# Patient Record
Sex: MALE | Race: WHITE | NOT HISPANIC OR LATINO | Employment: FULL TIME | URBAN - METROPOLITAN AREA
[De-identification: names, ages, dates, MRNs, and addresses within clinical notes are randomized per-mention and may not be internally consistent; named-entity substitution may affect disease eponyms.]

---

## 2017-08-24 ENCOUNTER — HOSPITAL ENCOUNTER (OUTPATIENT)
Dept: CT IMAGING | Facility: HOSPITAL | Age: 52
Discharge: HOME | End: 2017-08-24
Attending: NURSE PRACTITIONER | Admitting: NURSE PRACTITIONER

## 2017-08-24 LAB — CREAT BLDA-MCNC: 0.9 MG/DL (ref 0.6–1.3)

## 2017-10-03 ENCOUNTER — OFFICE (AMBULATORY)
Dept: URBAN - METROPOLITAN AREA CLINIC 64 | Facility: CLINIC | Age: 52
End: 2017-10-03

## 2017-10-03 ENCOUNTER — ON CAMPUS - OUTPATIENT (AMBULATORY)
Dept: URBAN - METROPOLITAN AREA HOSPITAL 2 | Facility: HOSPITAL | Age: 52
End: 2017-10-03
Payer: COMMERCIAL

## 2017-10-03 VITALS
SYSTOLIC BLOOD PRESSURE: 135 MMHG | OXYGEN SATURATION: 97 % | DIASTOLIC BLOOD PRESSURE: 71 MMHG | HEART RATE: 71 BPM | SYSTOLIC BLOOD PRESSURE: 156 MMHG | HEART RATE: 79 BPM | OXYGEN SATURATION: 96 % | WEIGHT: 203 LBS | SYSTOLIC BLOOD PRESSURE: 116 MMHG | TEMPERATURE: 98 F | SYSTOLIC BLOOD PRESSURE: 160 MMHG | DIASTOLIC BLOOD PRESSURE: 84 MMHG | DIASTOLIC BLOOD PRESSURE: 77 MMHG | SYSTOLIC BLOOD PRESSURE: 107 MMHG | OXYGEN SATURATION: 98 % | DIASTOLIC BLOOD PRESSURE: 61 MMHG | HEART RATE: 75 BPM | HEART RATE: 77 BPM | OXYGEN SATURATION: 95 % | DIASTOLIC BLOOD PRESSURE: 99 MMHG | RESPIRATION RATE: 18 BRPM | HEART RATE: 74 BPM | DIASTOLIC BLOOD PRESSURE: 98 MMHG | OXYGEN SATURATION: 99 % | DIASTOLIC BLOOD PRESSURE: 78 MMHG | HEART RATE: 65 BPM | RESPIRATION RATE: 16 BRPM | SYSTOLIC BLOOD PRESSURE: 130 MMHG | HEART RATE: 80 BPM | HEIGHT: 67 IN | SYSTOLIC BLOOD PRESSURE: 115 MMHG

## 2017-10-03 DIAGNOSIS — K62.1 RECTAL POLYP: ICD-10-CM

## 2017-10-03 DIAGNOSIS — D12.3 BENIGN NEOPLASM OF TRANSVERSE COLON: ICD-10-CM

## 2017-10-03 DIAGNOSIS — Z12.11 ENCOUNTER FOR SCREENING FOR MALIGNANT NEOPLASM OF COLON: ICD-10-CM

## 2017-10-03 LAB
GI HISTOLOGY: A. UNSPECIFIED: (no result)
GI HISTOLOGY: B. UNSPECIFIED: (no result)
GI HISTOLOGY: PDF REPORT: (no result)

## 2017-10-03 PROCEDURE — 88305 TISSUE EXAM BY PATHOLOGIST: CPT

## 2017-10-03 PROCEDURE — 45385 COLONOSCOPY W/LESION REMOVAL: CPT | Mod: 33

## 2017-10-03 RX ADMIN — PROPOFOL: 10 INJECTION, EMULSION INTRAVENOUS at 11:32

## 2020-08-26 ENCOUNTER — LAB (OUTPATIENT)
Dept: LAB | Facility: HOSPITAL | Age: 55
End: 2020-08-26

## 2020-08-26 ENCOUNTER — HOSPITAL ENCOUNTER (OUTPATIENT)
Dept: CARDIOLOGY | Facility: HOSPITAL | Age: 55
Discharge: HOME | End: 2020-08-26
Admitting: ORTHOPAEDIC SURGERY

## 2020-08-26 LAB
DEPRECATED RDW RBC AUTO: 42.3 FL (ref 37–54)
ERYTHROCYTE [DISTWIDTH] IN BLOOD BY AUTOMATED COUNT: 11.9 % (ref 12.3–15.4)
HCT VFR BLD AUTO: 48.3 % (ref 37.5–51)
HGB BLD-MCNC: 16.2 G/DL (ref 13–17.7)
MCH RBC QN AUTO: 32.5 PG (ref 26.6–33)
MCHC RBC AUTO-ENTMCNC: 33.5 G/DL (ref 31.5–35.7)
MCV RBC AUTO: 96.8 FL (ref 79–97)
PLATELET # BLD AUTO: 274 10*3/MM3 (ref 140–450)
PMV BLD AUTO: 10.9 FL (ref 6–12)
RBC # BLD AUTO: 4.99 10*6/MM3 (ref 4.14–5.8)
WBC # BLD AUTO: 8.66 10*3/MM3 (ref 3.4–10.8)

## 2020-08-26 PROCEDURE — U0002 COVID-19 LAB TEST NON-CDC: HCPCS

## 2020-08-26 PROCEDURE — C9803 HOPD COVID-19 SPEC COLLECT: HCPCS

## 2020-08-26 PROCEDURE — U0004 COV-19 TEST NON-CDC HGH THRU: HCPCS

## 2020-08-26 PROCEDURE — 85027 COMPLETE CBC AUTOMATED: CPT

## 2020-08-26 PROCEDURE — 93005 ELECTROCARDIOGRAM TRACING: CPT | Performed by: ORTHOPAEDIC SURGERY

## 2020-08-27 ENCOUNTER — ANESTHESIA EVENT (OUTPATIENT)
Dept: PERIOP | Facility: HOSPITAL | Age: 55
End: 2020-08-27

## 2020-08-27 LAB
REF LAB TEST METHOD: NORMAL
SARS-COV-2 RNA RESP QL NAA+PROBE: NOT DETECTED

## 2020-08-28 ENCOUNTER — HOSPITAL ENCOUNTER (OUTPATIENT)
Facility: HOSPITAL | Age: 55
Setting detail: HOSPITAL OUTPATIENT SURGERY
Discharge: HOME | End: 2020-08-28
Attending: ORTHOPAEDIC SURGERY | Admitting: ORTHOPAEDIC SURGERY

## 2020-08-28 ENCOUNTER — ANESTHESIA (OUTPATIENT)
Dept: PERIOP | Facility: HOSPITAL | Age: 55
End: 2020-08-28

## 2020-08-28 VITALS
OXYGEN SATURATION: 92 % | RESPIRATION RATE: 16 BRPM | DIASTOLIC BLOOD PRESSURE: 71 MMHG | HEART RATE: 88 BPM | TEMPERATURE: 96.5 F | HEIGHT: 67 IN | BODY MASS INDEX: 32.15 KG/M2 | WEIGHT: 204.81 LBS | SYSTOLIC BLOOD PRESSURE: 135 MMHG

## 2020-08-28 PROCEDURE — 25010000002 KETOROLAC TROMETHAMINE PER 15 MG: Performed by: ANESTHESIOLOGY

## 2020-08-28 PROCEDURE — 25010000002 MIDAZOLAM PER 1 MG: Performed by: ANESTHESIOLOGIST ASSISTANT

## 2020-08-28 PROCEDURE — 25010000002 FENTANYL CITRATE (PF) 100 MCG/2ML SOLUTION: Performed by: ANESTHESIOLOGIST ASSISTANT

## 2020-08-28 PROCEDURE — 25010000002 PROPOFOL 10 MG/ML EMULSION: Performed by: ANESTHESIOLOGIST ASSISTANT

## 2020-08-28 PROCEDURE — 25010000002 DEXAMETHASONE PER 1 MG: Performed by: ANESTHESIOLOGIST ASSISTANT

## 2020-08-28 PROCEDURE — 25010000002 ONDANSETRON PER 1 MG: Performed by: ANESTHESIOLOGIST ASSISTANT

## 2020-08-28 PROCEDURE — 25010000002 METOCLOPRAMIDE PER 10 MG: Performed by: ANESTHESIOLOGY

## 2020-08-28 PROCEDURE — 25010000002 TRIAMCINOLONE PER 10 MG: Performed by: ORTHOPAEDIC SURGERY

## 2020-08-28 RX ORDER — BUPIVACAINE HYDROCHLORIDE AND EPINEPHRINE 2.5; 5 MG/ML; UG/ML
INJECTION, SOLUTION INFILTRATION; PERINEURAL AS NEEDED
Status: DISCONTINUED | OUTPATIENT
Start: 2020-08-28 | End: 2020-08-28 | Stop reason: HOSPADM

## 2020-08-28 RX ORDER — TRIAMCINOLONE ACETONIDE 40 MG/ML
INJECTION, SUSPENSION INTRA-ARTICULAR; INTRAMUSCULAR AS NEEDED
Status: DISCONTINUED | OUTPATIENT
Start: 2020-08-28 | End: 2020-08-28 | Stop reason: HOSPADM

## 2020-08-28 RX ORDER — METOCLOPRAMIDE HYDROCHLORIDE 5 MG/ML
INJECTION INTRAMUSCULAR; INTRAVENOUS AS NEEDED
Status: DISCONTINUED | OUTPATIENT
Start: 2020-08-28 | End: 2020-08-28 | Stop reason: SURG

## 2020-08-28 RX ORDER — DIPHENHYDRAMINE HYDROCHLORIDE 50 MG/ML
12.5 INJECTION INTRAMUSCULAR; INTRAVENOUS
Status: DISCONTINUED | OUTPATIENT
Start: 2020-08-28 | End: 2020-08-28 | Stop reason: HOSPADM

## 2020-08-28 RX ORDER — HYDROMORPHONE HCL 110MG/55ML
0.5 PATIENT CONTROLLED ANALGESIA SYRINGE INTRAVENOUS
Status: DISCONTINUED | OUTPATIENT
Start: 2020-08-28 | End: 2020-08-28 | Stop reason: HOSPADM

## 2020-08-28 RX ORDER — HYDROCODONE BITARTRATE AND ACETAMINOPHEN 5; 325 MG/1; MG/1
1 TABLET ORAL EVERY 4 HOURS PRN
Qty: 30 TABLET | Refills: 0 | Status: SHIPPED | OUTPATIENT
Start: 2020-08-28

## 2020-08-28 RX ORDER — PROPOFOL 10 MG/ML
VIAL (ML) INTRAVENOUS AS NEEDED
Status: DISCONTINUED | OUTPATIENT
Start: 2020-08-28 | End: 2020-08-28 | Stop reason: SURG

## 2020-08-28 RX ORDER — FENTANYL CITRATE 50 UG/ML
INJECTION, SOLUTION INTRAMUSCULAR; INTRAVENOUS AS NEEDED
Status: DISCONTINUED | OUTPATIENT
Start: 2020-08-28 | End: 2020-08-28 | Stop reason: SURG

## 2020-08-28 RX ORDER — MEPERIDINE HYDROCHLORIDE 25 MG/ML
12.5 INJECTION INTRAMUSCULAR; INTRAVENOUS; SUBCUTANEOUS
Status: DISCONTINUED | OUTPATIENT
Start: 2020-08-28 | End: 2020-08-28 | Stop reason: HOSPADM

## 2020-08-28 RX ORDER — KETOROLAC TROMETHAMINE 30 MG/ML
INJECTION, SOLUTION INTRAMUSCULAR; INTRAVENOUS AS NEEDED
Status: DISCONTINUED | OUTPATIENT
Start: 2020-08-28 | End: 2020-08-28 | Stop reason: SURG

## 2020-08-28 RX ORDER — SODIUM CHLORIDE 0.9 % (FLUSH) 0.9 %
10 SYRINGE (ML) INJECTION AS NEEDED
Status: DISCONTINUED | OUTPATIENT
Start: 2020-08-28 | End: 2020-08-28 | Stop reason: HOSPADM

## 2020-08-28 RX ORDER — LABETALOL HYDROCHLORIDE 5 MG/ML
5 INJECTION, SOLUTION INTRAVENOUS
Status: DISCONTINUED | OUTPATIENT
Start: 2020-08-28 | End: 2020-08-28 | Stop reason: HOSPADM

## 2020-08-28 RX ORDER — MIDAZOLAM HYDROCHLORIDE 1 MG/ML
INJECTION INTRAMUSCULAR; INTRAVENOUS AS NEEDED
Status: DISCONTINUED | OUTPATIENT
Start: 2020-08-28 | End: 2020-08-28 | Stop reason: SURG

## 2020-08-28 RX ORDER — SODIUM CHLORIDE, SODIUM LACTATE, POTASSIUM CHLORIDE, CALCIUM CHLORIDE 600; 310; 30; 20 MG/100ML; MG/100ML; MG/100ML; MG/100ML
9 INJECTION, SOLUTION INTRAVENOUS CONTINUOUS PRN
Status: DISCONTINUED | OUTPATIENT
Start: 2020-08-28 | End: 2020-08-28 | Stop reason: HOSPADM

## 2020-08-28 RX ORDER — ONDANSETRON 2 MG/ML
4 INJECTION INTRAMUSCULAR; INTRAVENOUS ONCE AS NEEDED
Status: COMPLETED | OUTPATIENT
Start: 2020-08-28 | End: 2020-08-28

## 2020-08-28 RX ORDER — DEXAMETHASONE SODIUM PHOSPHATE 4 MG/ML
INJECTION, SOLUTION INTRA-ARTICULAR; INTRALESIONAL; INTRAMUSCULAR; INTRAVENOUS; SOFT TISSUE AS NEEDED
Status: DISCONTINUED | OUTPATIENT
Start: 2020-08-28 | End: 2020-08-28 | Stop reason: SURG

## 2020-08-28 RX ORDER — IPRATROPIUM BROMIDE AND ALBUTEROL SULFATE 2.5; .5 MG/3ML; MG/3ML
3 SOLUTION RESPIRATORY (INHALATION) ONCE AS NEEDED
Status: DISCONTINUED | OUTPATIENT
Start: 2020-08-28 | End: 2020-08-28 | Stop reason: HOSPADM

## 2020-08-28 RX ORDER — LIDOCAINE HYDROCHLORIDE 10 MG/ML
INJECTION, SOLUTION EPIDURAL; INFILTRATION; INTRACAUDAL; PERINEURAL AS NEEDED
Status: DISCONTINUED | OUTPATIENT
Start: 2020-08-28 | End: 2020-08-28 | Stop reason: SURG

## 2020-08-28 RX ORDER — SODIUM CHLORIDE 0.9 % (FLUSH) 0.9 %
10 SYRINGE (ML) INJECTION EVERY 12 HOURS SCHEDULED
Status: DISCONTINUED | OUTPATIENT
Start: 2020-08-28 | End: 2020-08-28 | Stop reason: HOSPADM

## 2020-08-28 RX ORDER — HYDROCODONE BITARTRATE AND ACETAMINOPHEN 10; 325 MG/1; MG/1
1 TABLET ORAL ONCE AS NEEDED
Status: DISCONTINUED | OUTPATIENT
Start: 2020-08-28 | End: 2020-08-28 | Stop reason: HOSPADM

## 2020-08-28 RX ADMIN — MIDAZOLAM 2 MG: 1 INJECTION INTRAMUSCULAR; INTRAVENOUS at 15:18

## 2020-08-28 RX ADMIN — SODIUM CHLORIDE, SODIUM LACTATE, POTASSIUM CHLORIDE, AND CALCIUM CHLORIDE 9 ML/HR: 600; 310; 30; 20 INJECTION, SOLUTION INTRAVENOUS at 13:29

## 2020-08-28 RX ADMIN — PROPOFOL 200 MG: 10 INJECTION, EMULSION INTRAVENOUS at 15:18

## 2020-08-28 RX ADMIN — FENTANYL CITRATE 100 MCG: 50 INJECTION, SOLUTION INTRAMUSCULAR; INTRAVENOUS at 16:18

## 2020-08-28 RX ADMIN — CEFAZOLIN SODIUM 2 G: 1 INJECTION, POWDER, FOR SOLUTION INTRAMUSCULAR; INTRAVENOUS at 15:23

## 2020-08-28 RX ADMIN — FENTANYL CITRATE 100 MCG: 50 INJECTION, SOLUTION INTRAMUSCULAR; INTRAVENOUS at 15:18

## 2020-08-28 RX ADMIN — METOCLOPRAMIDE 10 MG: 5 INJECTION, SOLUTION INTRAMUSCULAR; INTRAVENOUS at 15:18

## 2020-08-28 RX ADMIN — GLYCOPYRROLATE 0.2 MG: 0.2 INJECTION, SOLUTION INTRAMUSCULAR; INTRAVITREAL at 15:18

## 2020-08-28 RX ADMIN — DEXAMETHASONE SODIUM PHOSPHATE 4 MG: 4 INJECTION, SOLUTION INTRAMUSCULAR; INTRAVENOUS at 15:25

## 2020-08-28 RX ADMIN — KETOROLAC TROMETHAMINE 30 MG: 30 INJECTION, SOLUTION INTRAMUSCULAR at 16:09

## 2020-08-28 RX ADMIN — ONDANSETRON 4 MG: 2 INJECTION INTRAMUSCULAR; INTRAVENOUS at 16:09

## 2020-08-28 RX ADMIN — FENTANYL CITRATE 100 MCG: 50 INJECTION, SOLUTION INTRAMUSCULAR; INTRAVENOUS at 15:45

## 2020-08-28 RX ADMIN — LIDOCAINE HYDROCHLORIDE 50 MG: 10 INJECTION, SOLUTION EPIDURAL; INFILTRATION; INTRACAUDAL; PERINEURAL at 15:18

## 2020-08-28 NOTE — ANESTHESIA PROCEDURE NOTES
Airway  Urgency: elective    Date/Time: 8/28/2020 3:20 PM  Airway not difficult    General Information and Staff    Patient location during procedure: OR  Anesthesiologist: Campbell Salgado MD  CRNA: Eliazar Montoya AA    Indications and Patient Condition  Indications for airway management: CNS depression    Preoxygenated: yes  MILS maintained throughout  Mask difficulty assessment: 0 - not attempted    Final Airway Details  Final airway type: supraglottic airway      Successful airway: LMA  Size 4    Number of attempts at approach: 1  Assessment: lips, teeth, and gum same as pre-op

## 2020-08-28 NOTE — ANESTHESIA POSTPROCEDURE EVALUATION
Patient: John Paul Tineo    Procedure Summary     Date:  08/28/20 Room / Location:  [facility] OR 11 / [facility] MAIN OR    Anesthesia Start:  1514 Anesthesia Stop:  1622    Procedure:  KNEE ARTHROSCOPY with partial and lateral  MENISCECTOMY AND CHONROPLASTY (Left Knee) Diagnosis:       Degenerative tear of posterior horn of lateral meniscus      (Degenerative tear of posterior horn of lateral meniscus [M23.359])    Surgeon:  Yovany Jacobs II, MD Provider:  Lio Ramachandran MD    Anesthesia Type:  general ASA Status:  2          Anesthesia Type: general    Vitals  Vitals Value Taken Time   /80 8/28/2020  5:14 PM   Temp 97.7 °F (36.5 °C) 8/28/2020  4:21 PM   Pulse 85 8/28/2020  5:14 PM   Resp 16 8/28/2020  4:51 PM   SpO2 93 % 8/28/2020  5:14 PM   Vitals shown include unvalidated device data.        Post Anesthesia Care and Evaluation    Patient location during evaluation: PACU  Patient participation: complete - patient participated  Level of consciousness: awake  Pain scale: See nurse's notes for pain score.  Pain management: adequate  Airway patency: patent  Anesthetic complications: No anesthetic complications  PONV Status: none  Cardiovascular status: acceptable  Respiratory status: acceptable  Hydration status: acceptable    Comments: Patient seen and examined postoperatively; vital signs stable; SpO2 greater than or equal to 90%; cardiopulmonary status stable; nausea/vomiting adequately controlled; pain adequately controlled; no apparent anesthesia complications; patient discharged from anesthesia care when discharge criteria were met

## 2020-08-28 NOTE — ANESTHESIA PREPROCEDURE EVALUATION
Anesthesia Evaluation     Patient summary reviewed and Nursing notes reviewed   NPO Solid Status: > 8 hours  NPO Liquid Status: > 8 hours           Airway   Mallampati: I  TM distance: >3 FB  Neck ROM: full  No difficulty expected  Dental - normal exam     Pulmonary - normal exam   (+) a smoker Current Smoked day of surgery,   Cardiovascular - negative cardio ROS and normal exam        Neuro/Psych- negative ROS  GI/Hepatic/Renal/Endo - negative ROS     Musculoskeletal (-) negative ROS    Abdominal  - normal exam    Bowel sounds: normal.   Substance History - negative use     OB/GYN negative ob/gyn ROS         Other                        Anesthesia Plan    ASA 2     general     intravenous induction     Anesthetic plan, all risks, benefits, and alternatives have been provided, discussed and informed consent has been obtained with: patient.    Plan discussed with CRNA and CAA.

## 2020-09-10 PROCEDURE — 93010 ELECTROCARDIOGRAM REPORT: CPT | Performed by: INTERNAL MEDICINE

## 2022-10-06 ENCOUNTER — OFFICE (AMBULATORY)
Dept: URBAN - METROPOLITAN AREA PATHOLOGY 4 | Facility: PATHOLOGY | Age: 57
End: 2022-10-06

## 2022-10-06 ENCOUNTER — ON CAMPUS - OUTPATIENT (AMBULATORY)
Dept: URBAN - METROPOLITAN AREA HOSPITAL 2 | Facility: HOSPITAL | Age: 57
End: 2022-10-06

## 2022-10-06 VITALS
SYSTOLIC BLOOD PRESSURE: 137 MMHG | DIASTOLIC BLOOD PRESSURE: 77 MMHG | DIASTOLIC BLOOD PRESSURE: 84 MMHG | SYSTOLIC BLOOD PRESSURE: 130 MMHG | SYSTOLIC BLOOD PRESSURE: 141 MMHG | OXYGEN SATURATION: 95 % | HEART RATE: 69 BPM | SYSTOLIC BLOOD PRESSURE: 128 MMHG | DIASTOLIC BLOOD PRESSURE: 98 MMHG | HEIGHT: 67 IN | DIASTOLIC BLOOD PRESSURE: 83 MMHG | OXYGEN SATURATION: 96 % | SYSTOLIC BLOOD PRESSURE: 114 MMHG | RESPIRATION RATE: 16 BRPM | OXYGEN SATURATION: 99 % | WEIGHT: 188 LBS | SYSTOLIC BLOOD PRESSURE: 132 MMHG | DIASTOLIC BLOOD PRESSURE: 78 MMHG | SYSTOLIC BLOOD PRESSURE: 143 MMHG | HEART RATE: 86 BPM | HEART RATE: 82 BPM | RESPIRATION RATE: 27 BRPM | SYSTOLIC BLOOD PRESSURE: 139 MMHG | HEART RATE: 77 BPM | TEMPERATURE: 98 F | DIASTOLIC BLOOD PRESSURE: 67 MMHG | RESPIRATION RATE: 18 BRPM | OXYGEN SATURATION: 98 % | HEART RATE: 79 BPM | DIASTOLIC BLOOD PRESSURE: 82 MMHG | OXYGEN SATURATION: 97 % | RESPIRATION RATE: 15 BRPM

## 2022-10-06 DIAGNOSIS — D12.3 BENIGN NEOPLASM OF TRANSVERSE COLON: ICD-10-CM

## 2022-10-06 DIAGNOSIS — D12.4 BENIGN NEOPLASM OF DESCENDING COLON: ICD-10-CM

## 2022-10-06 DIAGNOSIS — K57.30 DIVERTICULOSIS OF LARGE INTESTINE WITHOUT PERFORATION OR ABS: ICD-10-CM

## 2022-10-06 DIAGNOSIS — Z86.010 PERSONAL HISTORY OF COLONIC POLYPS: ICD-10-CM

## 2022-10-06 DIAGNOSIS — K64.1 SECOND DEGREE HEMORRHOIDS: ICD-10-CM

## 2022-10-06 PROBLEM — K63.5 POLYP OF COLON: Status: ACTIVE | Noted: 2022-10-06

## 2022-10-06 PROCEDURE — 45385 COLONOSCOPY W/LESION REMOVAL: CPT | Mod: 33 | Performed by: INTERNAL MEDICINE

## 2022-10-06 PROCEDURE — 88305 TISSUE EXAM BY PATHOLOGIST: CPT | Performed by: INTERNAL MEDICINE

## 2023-11-15 ENCOUNTER — OFFICE VISIT (OUTPATIENT)
Dept: CARDIOLOGY | Facility: CLINIC | Age: 58
End: 2023-11-15
Payer: COMMERCIAL

## 2023-11-15 VITALS
HEIGHT: 67 IN | OXYGEN SATURATION: 98 % | WEIGHT: 202 LBS | SYSTOLIC BLOOD PRESSURE: 146 MMHG | BODY MASS INDEX: 31.71 KG/M2 | DIASTOLIC BLOOD PRESSURE: 85 MMHG | HEART RATE: 67 BPM

## 2023-11-15 DIAGNOSIS — R06.02 SHORTNESS OF BREATH: ICD-10-CM

## 2023-11-15 DIAGNOSIS — R93.89 ABNORMAL CT OF THE CHEST: ICD-10-CM

## 2023-11-15 DIAGNOSIS — I10 PRIMARY HYPERTENSION: ICD-10-CM

## 2023-11-15 DIAGNOSIS — E78.00 PURE HYPERCHOLESTEROLEMIA: ICD-10-CM

## 2023-11-15 DIAGNOSIS — I20.9 ANGINA PECTORIS: Primary | ICD-10-CM

## 2023-11-15 PROCEDURE — 99204 OFFICE O/P NEW MOD 45 MIN: CPT | Performed by: INTERNAL MEDICINE

## 2023-11-15 PROCEDURE — 93000 ELECTROCARDIOGRAM COMPLETE: CPT | Performed by: INTERNAL MEDICINE

## 2023-11-15 RX ORDER — ROSUVASTATIN CALCIUM 5 MG/1
5 TABLET, COATED ORAL DAILY
COMMUNITY
Start: 2023-10-11

## 2023-11-15 NOTE — PROGRESS NOTES
"    Subjective:     Encounter Date:11/15/2023      Patient ID: John Paul Tineo is a 58 y.o. male.    Chief Complaint:  History of Present Illness 58-year-old white male with history of tobacco usage hypertension hyperlipidemia presents to my office for new consultation.  Patient has been having symptoms of chest pain which he describes as a substernal discomfort without radiation but also also with shortness of breath.  No complains any PND orthopnea.  No palpitation dizziness syncope or swelling of the feet.  Patient has been taking his medicines which were recently started.  Patient also continues to smoke.  He had a CT scan of the chest as a routine part of lung cancer screening and was noted to have coronary artery calcifications.  Patient also has strong family history for coronary disease with early and premature coronary disease.  /85 Comment: recheck  Pulse 67   Ht 170.2 cm (67\")   Wt 91.6 kg (202 lb)   SpO2 98%   BMI 31.64 kg/m²     The following portions of the patient's history were reviewed and updated as appropriate: allergies, current medications, past family history, past medical history, past social history, past surgical history, and problem list.  Past Medical History:   Diagnosis Date    Hyperlipidemia      Past Surgical History:   Procedure Laterality Date    COLONOSCOPY      KNEE ARTHROSCOPY Left 8/28/2020    Procedure: KNEE ARTHROSCOPY with partial and lateral  MENISCECTOMY AND CHONROPLASTY;  Surgeon: Yovany Jacobs II, MD;  Location: [facility];  Service: Orthopedics;  Laterality: Left;     Social History     Socioeconomic History    Marital status:    Tobacco Use    Smoking status: Every Day     Packs/day: .5     Types: Cigarettes     Passive exposure: Current    Smokeless tobacco: Never   Vaping Use    Vaping Use: Never used   Substance and Sexual Activity    Alcohol use: Yes     Alcohol/week: 24.0 standard drinks of alcohol     Types: 24 Cans of beer per " week    Drug use: Never    Sexual activity: Defer     Family History   Problem Relation Age of Onset    Hyperlipidemia Mother     Hyperlipidemia Father     Heart attack Father        Current Outpatient Medications:     rosuvastatin (CRESTOR) 5 MG tablet, Take 1 tablet by mouth Daily., Disp: , Rfl:     HYDROcodone-acetaminophen (NORCO) 5-325 MG per tablet, Take 1 tablet by mouth Every 4 (Four) Hours As Needed for Severe Pain ., Disp: 30 tablet, Rfl: 0  No Known Allergies    Review of Systems   Constitutional: Negative for fever and malaise/fatigue.   HENT:  Negative for ear pain and nosebleeds.    Eyes:  Negative for blurred vision and double vision.   Cardiovascular:  Positive for chest pain. Negative for dyspnea on exertion, leg swelling and palpitations.   Respiratory:  Positive for shortness of breath. Negative for cough.    Skin:  Negative for rash.   Musculoskeletal:  Negative for joint pain.   Gastrointestinal:  Negative for abdominal pain, nausea and vomiting.   Neurological:  Negative for dizziness, focal weakness, headaches, light-headedness and numbness.   Psychiatric/Behavioral:  Negative for depression. The patient is not nervous/anxious.    All other systems reviewed and are negative.             Objective:     Constitutional:       Appearance: Well-developed.   Eyes:      General: No scleral icterus.     Conjunctiva/sclera: Conjunctivae normal.      Pupils: Pupils are equal, round, and reactive to light.   HENT:      Head: Normocephalic and atraumatic.   Neck:      Vascular: No carotid bruit or JVD.   Pulmonary:      Effort: Pulmonary effort is normal.      Breath sounds: Normal breath sounds. No wheezing. No rales.   Cardiovascular:      Normal rate. Regular rhythm.   Pulses:     Intact distal pulses.   Abdominal:      General: Bowel sounds are normal.      Palpations: Abdomen is soft.   Musculoskeletal: Normal range of motion.      Cervical back: Normal range of motion and neck supple. Skin:      General: Skin is warm and dry.      Findings: No rash.   Neurological:      Mental Status: Alert.      Comments: No focal deficits           ECG 12 Lead    Date/Time: 11/15/2023 1:23 PM  Performed by: Fred Lopez MD    Authorized by: Fred Lopez MD  Comments: Sinus rhythm  Poor R wave progression anterior leads, cannot rule out anterior tract  Abnormal EKG  No previous is available          Lab Review:       Assessment:          Diagnosis Plan   1. Angina pectoris        2. Shortness of breath        3. Primary hypertension        4. Pure hypercholesterolemia        5. Abnormal CT of the chest               Plan:    Patient present with chest pain or shortness of breath and has risk factor for coronary disease  Patient also had a CT scan of the chest which showed significant coronary artery calcifications  Patient will have a stress Myoview study to rule out ischemia  Patient blood pressure is high and needs to be started on blood pressure medicines after he checks his blood pressure at home  Patient is already on statins for his hyperlipidemia.  Further treatment based on stress test result

## 2023-11-15 NOTE — H&P (VIEW-ONLY)
"    Subjective:     Encounter Date:11/15/2023      Patient ID: John Paul Tineo is a 58 y.o. male.    Chief Complaint:  History of Present Illness 58-year-old white male with history of tobacco usage hypertension hyperlipidemia presents to my office for new consultation.  Patient has been having symptoms of chest pain which he describes as a substernal discomfort without radiation but also also with shortness of breath.  No complains any PND orthopnea.  No palpitation dizziness syncope or swelling of the feet.  Patient has been taking his medicines which were recently started.  Patient also continues to smoke.  He had a CT scan of the chest as a routine part of lung cancer screening and was noted to have coronary artery calcifications.  Patient also has strong family history for coronary disease with early and premature coronary disease.  /85 Comment: recheck  Pulse 67   Ht 170.2 cm (67\")   Wt 91.6 kg (202 lb)   SpO2 98%   BMI 31.64 kg/m²     The following portions of the patient's history were reviewed and updated as appropriate: allergies, current medications, past family history, past medical history, past social history, past surgical history, and problem list.  Past Medical History:   Diagnosis Date    Hyperlipidemia      Past Surgical History:   Procedure Laterality Date    COLONOSCOPY      KNEE ARTHROSCOPY Left 8/28/2020    Procedure: KNEE ARTHROSCOPY with partial and lateral  MENISCECTOMY AND CHONROPLASTY;  Surgeon: Yovany Jacobs II, MD;  Location: [facility];  Service: Orthopedics;  Laterality: Left;     Social History     Socioeconomic History    Marital status:    Tobacco Use    Smoking status: Every Day     Packs/day: .5     Types: Cigarettes     Passive exposure: Current    Smokeless tobacco: Never   Vaping Use    Vaping Use: Never used   Substance and Sexual Activity    Alcohol use: Yes     Alcohol/week: 24.0 standard drinks of alcohol     Types: 24 Cans of beer per " week    Drug use: Never    Sexual activity: Defer     Family History   Problem Relation Age of Onset    Hyperlipidemia Mother     Hyperlipidemia Father     Heart attack Father        Current Outpatient Medications:     rosuvastatin (CRESTOR) 5 MG tablet, Take 1 tablet by mouth Daily., Disp: , Rfl:     HYDROcodone-acetaminophen (NORCO) 5-325 MG per tablet, Take 1 tablet by mouth Every 4 (Four) Hours As Needed for Severe Pain ., Disp: 30 tablet, Rfl: 0  No Known Allergies    Review of Systems   Constitutional: Negative for fever and malaise/fatigue.   HENT:  Negative for ear pain and nosebleeds.    Eyes:  Negative for blurred vision and double vision.   Cardiovascular:  Positive for chest pain. Negative for dyspnea on exertion, leg swelling and palpitations.   Respiratory:  Positive for shortness of breath. Negative for cough.    Skin:  Negative for rash.   Musculoskeletal:  Negative for joint pain.   Gastrointestinal:  Negative for abdominal pain, nausea and vomiting.   Neurological:  Negative for dizziness, focal weakness, headaches, light-headedness and numbness.   Psychiatric/Behavioral:  Negative for depression. The patient is not nervous/anxious.    All other systems reviewed and are negative.             Objective:     Constitutional:       Appearance: Well-developed.   Eyes:      General: No scleral icterus.     Conjunctiva/sclera: Conjunctivae normal.      Pupils: Pupils are equal, round, and reactive to light.   HENT:      Head: Normocephalic and atraumatic.   Neck:      Vascular: No carotid bruit or JVD.   Pulmonary:      Effort: Pulmonary effort is normal.      Breath sounds: Normal breath sounds. No wheezing. No rales.   Cardiovascular:      Normal rate. Regular rhythm.   Pulses:     Intact distal pulses.   Abdominal:      General: Bowel sounds are normal.      Palpations: Abdomen is soft.   Musculoskeletal: Normal range of motion.      Cervical back: Normal range of motion and neck supple. Skin:      General: Skin is warm and dry.      Findings: No rash.   Neurological:      Mental Status: Alert.      Comments: No focal deficits           ECG 12 Lead    Date/Time: 11/15/2023 1:23 PM  Performed by: Fred Lopez MD    Authorized by: Fred Lopez MD  Comments: Sinus rhythm  Poor R wave progression anterior leads, cannot rule out anterior tract  Abnormal EKG  No previous is available          Lab Review:       Assessment:          Diagnosis Plan   1. Angina pectoris        2. Shortness of breath        3. Primary hypertension        4. Pure hypercholesterolemia        5. Abnormal CT of the chest               Plan:    Patient present with chest pain or shortness of breath and has risk factor for coronary disease  Patient also had a CT scan of the chest which showed significant coronary artery calcifications  Patient will have a stress Myoview study to rule out ischemia  Patient blood pressure is high and needs to be started on blood pressure medicines after he checks his blood pressure at home  Patient is already on statins for his hyperlipidemia.  Further treatment based on stress test result

## 2023-11-16 ENCOUNTER — PATIENT ROUNDING (BHMG ONLY) (OUTPATIENT)
Dept: CARDIOLOGY | Facility: CLINIC | Age: 58
End: 2023-11-16
Payer: COMMERCIAL

## 2023-11-28 ENCOUNTER — HOSPITAL ENCOUNTER (OUTPATIENT)
Dept: CARDIOLOGY | Facility: HOSPITAL | Age: 58
Discharge: HOME | End: 2023-11-28
Payer: COMMERCIAL

## 2023-11-28 DIAGNOSIS — I20.89 ANGINA AT REST: Primary | ICD-10-CM

## 2023-11-28 DIAGNOSIS — R06.02 SHORTNESS OF BREATH: ICD-10-CM

## 2023-11-28 DIAGNOSIS — I10 PRIMARY HYPERTENSION: ICD-10-CM

## 2023-11-28 DIAGNOSIS — R94.39 ABNORMAL NUCLEAR STRESS TEST: ICD-10-CM

## 2023-11-28 DIAGNOSIS — R93.89 ABNORMAL CT OF THE CHEST: ICD-10-CM

## 2023-11-28 DIAGNOSIS — I20.9 ANGINA PECTORIS: ICD-10-CM

## 2023-11-28 DIAGNOSIS — E78.00 PURE HYPERCHOLESTEROLEMIA: ICD-10-CM

## 2023-11-28 LAB
BH CV REST NUCLEAR ISOTOPE DOSE: 10.6 MCI
BH CV STRESS COMMENTS STAGE 1: NORMAL
BH CV STRESS DOSE REGADENOSON STAGE 1: 0.4
BH CV STRESS DURATION MIN STAGE 1: 0
BH CV STRESS DURATION SEC STAGE 1: 10
BH CV STRESS NUCLEAR ISOTOPE DOSE: 33.6 MCI
BH CV STRESS PROTOCOL 1: NORMAL
BH CV STRESS RECOVERY BP: NORMAL MMHG
BH CV STRESS RECOVERY HR: 86 BPM
BH CV STRESS STAGE 1: 1
LV EF NUC BP: 60 %
MAXIMAL PREDICTED HEART RATE: 163 BPM
STRESS BASELINE BP: NORMAL MMHG
STRESS BASELINE HR: 59 BPM
STRESS POST EXERCISE DUR MIN: 7 MIN
STRESS POST EXERCISE DUR SEC: 14 SEC
STRESS TARGET HR: 139 BPM

## 2023-11-28 PROCEDURE — A9500 TC99M SESTAMIBI: HCPCS | Performed by: INTERNAL MEDICINE

## 2023-11-28 PROCEDURE — 78452 HT MUSCLE IMAGE SPECT MULT: CPT | Performed by: INTERNAL MEDICINE

## 2023-11-28 PROCEDURE — 78452 HT MUSCLE IMAGE SPECT MULT: CPT

## 2023-11-28 PROCEDURE — 25010000002 REGADENOSON 0.4 MG/5ML SOLUTION: Performed by: INTERNAL MEDICINE

## 2023-11-28 PROCEDURE — 93017 CV STRESS TEST TRACING ONLY: CPT

## 2023-11-28 PROCEDURE — 93018 CV STRESS TEST I&R ONLY: CPT | Performed by: INTERNAL MEDICINE

## 2023-11-28 PROCEDURE — 93016 CV STRESS TEST SUPVJ ONLY: CPT | Performed by: NURSE PRACTITIONER

## 2023-11-28 PROCEDURE — 0 TECHNETIUM SESTAMIBI: Performed by: INTERNAL MEDICINE

## 2023-11-28 RX ORDER — NITROGLYCERIN 0.4 MG/1
TABLET SUBLINGUAL
Qty: 100 TABLET | Refills: 11 | Status: SHIPPED | OUTPATIENT
Start: 2023-11-28 | End: 2023-11-30

## 2023-11-28 RX ORDER — ASPIRIN 81 MG/1
81 TABLET ORAL DAILY
Status: ON HOLD | COMMUNITY

## 2023-11-28 RX ORDER — REGADENOSON 0.08 MG/ML
0.4 INJECTION, SOLUTION INTRAVENOUS
Status: COMPLETED | OUTPATIENT
Start: 2023-11-28 | End: 2023-11-28

## 2023-11-28 RX ORDER — METOPROLOL SUCCINATE 25 MG/1
25 TABLET, EXTENDED RELEASE ORAL DAILY
Qty: 90 TABLET | Refills: 3 | Status: ON HOLD | OUTPATIENT
Start: 2023-11-28

## 2023-11-28 RX ADMIN — REGADENOSON 0.4 MG: 0.08 INJECTION, SOLUTION INTRAVENOUS at 13:49

## 2023-11-28 RX ADMIN — TECHNETIUM TC 99M SESTAMIBI 1 DOSE: 1 INJECTION INTRAVENOUS at 13:49

## 2023-11-28 RX ADMIN — TECHNETIUM TC 99M SESTAMIBI 1 DOSE: 1 INJECTION INTRAVENOUS at 12:43

## 2023-11-28 NOTE — TELEPHONE ENCOUNTER
Rx Refill Note  Requested Prescriptions     Pending Prescriptions Disp Refills    metoprolol succinate XL (TOPROL-XL) 25 MG 24 hr tablet 90 tablet 3     Sig: Take 1 tablet by mouth Daily.    nitroglycerin (NITROSTAT) 0.4 MG SL tablet 100 tablet 11     Si under the tongue as needed for angina, may repeat q5mins for up three doses      Last office visit with prescribing clinician: 11/15/2023   Last telemedicine visit with prescribing clinician: Visit date not found   Next office visit with prescribing clinician: 5/15/2024                         Would you like a call back once the refill request has been completed: [] Yes [] No    If the office needs to give you a call back, can they leave a voicemail: [] Yes [] No    Erasmo Wu MA  23, 14:06 EST

## 2023-11-30 RX ORDER — NITROGLYCERIN 0.4 MG/1
0.4 TABLET SUBLINGUAL
COMMUNITY
End: 2023-12-08 | Stop reason: HOSPADM

## 2023-12-01 ENCOUNTER — ANESTHESIA EVENT (OUTPATIENT)
Dept: PERIOP | Facility: HOSPITAL | Age: 58
End: 2023-12-01
Payer: COMMERCIAL

## 2023-12-01 ENCOUNTER — LAB (OUTPATIENT)
Dept: LAB | Facility: HOSPITAL | Age: 58
End: 2023-12-01
Payer: COMMERCIAL

## 2023-12-01 ENCOUNTER — APPOINTMENT (OUTPATIENT)
Dept: CARDIOLOGY | Facility: HOSPITAL | Age: 58
DRG: 234 | End: 2023-12-01
Payer: COMMERCIAL

## 2023-12-01 ENCOUNTER — APPOINTMENT (OUTPATIENT)
Dept: RESPIRATORY THERAPY | Facility: HOSPITAL | Age: 58
DRG: 234 | End: 2023-12-01
Payer: COMMERCIAL

## 2023-12-01 ENCOUNTER — APPOINTMENT (OUTPATIENT)
Dept: GENERAL RADIOLOGY | Facility: HOSPITAL | Age: 58
DRG: 234 | End: 2023-12-01
Payer: COMMERCIAL

## 2023-12-01 ENCOUNTER — HOSPITAL ENCOUNTER (INPATIENT)
Facility: HOSPITAL | Age: 58
LOS: 7 days | Discharge: HOME | DRG: 234 | End: 2023-12-08
Attending: INTERNAL MEDICINE | Admitting: INTERNAL MEDICINE
Payer: COMMERCIAL

## 2023-12-01 DIAGNOSIS — I20.89 ANGINA AT REST: ICD-10-CM

## 2023-12-01 DIAGNOSIS — Z95.1 S/P CABG X 3: ICD-10-CM

## 2023-12-01 DIAGNOSIS — Z72.0 TOBACCO ABUSE: ICD-10-CM

## 2023-12-01 DIAGNOSIS — R94.39 ABNORMAL NUCLEAR STRESS TEST: ICD-10-CM

## 2023-12-01 DIAGNOSIS — J95.89 ACUTE POSTOPERATIVE RESPIRATORY INSUFFICIENCY: ICD-10-CM

## 2023-12-01 DIAGNOSIS — I25.118 CORONARY ARTERY DISEASE OF NATIVE ARTERY OF NATIVE HEART WITH STABLE ANGINA PECTORIS: Primary | ICD-10-CM

## 2023-12-01 PROBLEM — I25.10 CAD (CORONARY ARTERY DISEASE): Status: ACTIVE | Noted: 2023-12-01

## 2023-12-01 LAB
ACT BLD: >1000 SECONDS (ref 89–137)
ANION GAP SERPL CALCULATED.3IONS-SCNC: 9 MMOL/L (ref 5–15)
ARTERIAL PATENCY WRIST A: ABNORMAL
ATMOSPHERIC PRESS: ABNORMAL MM[HG]
BASE EXCESS BLDA CALC-SCNC: -0.9 MMOL/L (ref 0–3)
BASOPHILS # BLD AUTO: 0.1 10*3/MM3 (ref 0–0.2)
BASOPHILS NFR BLD AUTO: 0.8 % (ref 0–1.5)
BDY SITE: ABNORMAL
BH CV XLRA MEAS - DIST GSV CALF DIST LEFT: 0.14 CM
BH CV XLRA MEAS - DIST GSV CALF DIST RIGHT: 0.31 CM
BH CV XLRA MEAS - DIST GSV THIGH DIST LEFT: 0.51 CM
BH CV XLRA MEAS - DIST GSV THIGH DIST RIGHT: 0.46 CM
BH CV XLRA MEAS - MID GSV CALF LEFT: 0.21 CM
BH CV XLRA MEAS - MID GSV CALF RIGHT: 0.38 CM
BH CV XLRA MEAS - MID GSV THIGH  LEFT: 0.54 CM
BH CV XLRA MEAS - MID GSV THIGH  RIGHT: 0.43 CM
BH CV XLRA MEAS - PROX GSV CALF DIST LEFT: 0.25 CM
BH CV XLRA MEAS - PROX GSV CALF DIST RIGHT: 0.4 CM
BH CV XLRA MEAS - PROX GSV THIGH  LEFT: 0.65 CM
BH CV XLRA MEAS - PROX GSV THIGH  RIGHT: 0.54 CM
BH CV XLRA MEAS LEFT DIST CCA EDV: -23.3 CM/SEC
BH CV XLRA MEAS LEFT DIST CCA PSV: -83.4 CM/SEC
BH CV XLRA MEAS LEFT DIST ICA EDV: -39.2 CM/SEC
BH CV XLRA MEAS LEFT DIST ICA PSV: -118 CM/SEC
BH CV XLRA MEAS LEFT ICA/CCA RATIO: 1.41
BH CV XLRA MEAS LEFT PROX CCA EDV: 28 CM/SEC
BH CV XLRA MEAS LEFT PROX CCA PSV: 114 CM/SEC
BH CV XLRA MEAS LEFT PROX ECA EDV: -21.2 CM/SEC
BH CV XLRA MEAS LEFT PROX ECA PSV: -140 CM/SEC
BH CV XLRA MEAS LEFT PROX ICA EDV: -32.9 CM/SEC
BH CV XLRA MEAS LEFT PROX ICA PSV: -83.9 CM/SEC
BH CV XLRA MEAS LEFT PROX SCLA PSV: -88.9 CM/SEC
BH CV XLRA MEAS LEFT VERTEBRAL A EDV: -10.9 CM/SEC
BH CV XLRA MEAS LEFT VERTEBRAL A PSV: -44.5 CM/SEC
BH CV XLRA MEAS RIGHT DIST CCA EDV: -20.2 CM/SEC
BH CV XLRA MEAS RIGHT DIST CCA PSV: -69.4 CM/SEC
BH CV XLRA MEAS RIGHT DIST ICA EDV: -36.4 CM/SEC
BH CV XLRA MEAS RIGHT DIST ICA PSV: -79.9 CM/SEC
BH CV XLRA MEAS RIGHT ICA/CCA RATIO: 1.2
BH CV XLRA MEAS RIGHT PROX CCA EDV: 13.7 CM/SEC
BH CV XLRA MEAS RIGHT PROX CCA PSV: 96 CM/SEC
BH CV XLRA MEAS RIGHT PROX ECA EDV: -16.1 CM/SEC
BH CV XLRA MEAS RIGHT PROX ECA PSV: -144 CM/SEC
BH CV XLRA MEAS RIGHT PROX ICA EDV: -34.7 CM/SEC
BH CV XLRA MEAS RIGHT PROX ICA PSV: -83.4 CM/SEC
BH CV XLRA MEAS RIGHT PROX SCLA PSV: -112 CM/SEC
BH CV XLRA MEAS RIGHT VERTEBRAL A PSV: -17.9 CM/SEC
BILIRUB UR QL STRIP: NEGATIVE
BUN SERPL-MCNC: 17 MG/DL (ref 6–20)
BUN/CREAT SERPL: 20.7 (ref 7–25)
CALCIUM SPEC-SCNC: 9.4 MG/DL (ref 8.6–10.5)
CHLORIDE SERPL-SCNC: 107 MMOL/L (ref 98–107)
CLARITY UR: CLEAR
CO2 BLDA-SCNC: 25.7 MMOL/L (ref 22–29)
CO2 SERPL-SCNC: 24 MMOL/L (ref 22–29)
COLOR UR: YELLOW
CREAT SERPL-MCNC: 0.82 MG/DL (ref 0.76–1.27)
DEPRECATED RDW RBC AUTO: 45.1 FL (ref 37–54)
EGFRCR SERPLBLD CKD-EPI 2021: 102.5 ML/MIN/1.73
EOSINOPHIL # BLD AUTO: 0.2 10*3/MM3 (ref 0–0.4)
EOSINOPHIL NFR BLD AUTO: 2.4 % (ref 0.3–6.2)
ERYTHROCYTE [DISTWIDTH] IN BLOOD BY AUTOMATED COUNT: 12.8 % (ref 12.3–15.4)
GLUCOSE BLDC GLUCOMTR-MCNC: 91 MG/DL (ref 70–105)
GLUCOSE SERPL-MCNC: 102 MG/DL (ref 65–99)
GLUCOSE UR STRIP-MCNC: NEGATIVE MG/DL
HCO3 BLDA-SCNC: 24.4 MMOL/L (ref 21–28)
HCT VFR BLD AUTO: 45 % (ref 37.5–51)
HEMODILUTION: NO
HGB BLD-MCNC: 15.1 G/DL (ref 13–17.7)
HGB UR QL STRIP.AUTO: NEGATIVE
INHALED O2 CONCENTRATION: 21 %
KETONES UR QL STRIP: NEGATIVE
LEUKOCYTE ESTERASE UR QL STRIP.AUTO: NEGATIVE
LYMPHOCYTES # BLD AUTO: 2.1 10*3/MM3 (ref 0.7–3.1)
LYMPHOCYTES NFR BLD AUTO: 25.2 % (ref 19.6–45.3)
MCH RBC QN AUTO: 33.7 PG (ref 26.6–33)
MCHC RBC AUTO-ENTMCNC: 33.5 G/DL (ref 31.5–35.7)
MCV RBC AUTO: 100.6 FL (ref 79–97)
MODALITY: ABNORMAL
MONOCYTES # BLD AUTO: 0.8 10*3/MM3 (ref 0.1–0.9)
MONOCYTES NFR BLD AUTO: 9.3 % (ref 5–12)
MRSA DNA SPEC QL NAA+PROBE: ABNORMAL
NEUTROPHILS NFR BLD AUTO: 5.2 10*3/MM3 (ref 1.7–7)
NEUTROPHILS NFR BLD AUTO: 62.3 % (ref 42.7–76)
NITRITE UR QL STRIP: NEGATIVE
NRBC BLD AUTO-RTO: 0.1 /100 WBC (ref 0–0.2)
PCO2 BLDA: 41.7 MM HG (ref 35–48)
PH BLDA: 7.38 PH UNITS (ref 7.35–7.45)
PH UR STRIP.AUTO: 5.5 [PH] (ref 5–8)
PLATELET # BLD AUTO: 275 10*3/MM3 (ref 140–450)
PMV BLD AUTO: 8.9 FL (ref 6–12)
PO2 BLDA: 67.8 MM HG (ref 83–108)
POTASSIUM SERPL-SCNC: 4.4 MMOL/L (ref 3.5–5.2)
PROT UR QL STRIP: NEGATIVE
RBC # BLD AUTO: 4.48 10*6/MM3 (ref 4.14–5.8)
RIGHT ARM BP: NORMAL MMHG
SAO2 % BLDCOA: 92.7 % (ref 94–98)
SARS-COV-2 RNA RESP QL NAA+PROBE: NOT DETECTED
SODIUM SERPL-SCNC: 140 MMOL/L (ref 136–145)
SP GR UR STRIP: 1.02 (ref 1–1.03)
UROBILINOGEN UR QL STRIP: NORMAL
WBC NRBC COR # BLD AUTO: 8.3 10*3/MM3 (ref 3.4–10.8)

## 2023-12-01 PROCEDURE — 99152 MOD SED SAME PHYS/QHP 5/>YRS: CPT | Performed by: INTERNAL MEDICINE

## 2023-12-01 PROCEDURE — C1769 GUIDE WIRE: HCPCS | Performed by: INTERNAL MEDICINE

## 2023-12-01 PROCEDURE — 4A023N7 MEASUREMENT OF CARDIAC SAMPLING AND PRESSURE, LEFT HEART, PERCUTANEOUS APPROACH: ICD-10-PCS | Performed by: INTERNAL MEDICINE

## 2023-12-01 PROCEDURE — 25510000001 IOPAMIDOL PER 1 ML: Performed by: INTERNAL MEDICINE

## 2023-12-01 PROCEDURE — 87635 SARS-COV-2 COVID-19 AMP PRB: CPT | Performed by: NURSE PRACTITIONER

## 2023-12-01 PROCEDURE — 85025 COMPLETE CBC W/AUTO DIFF WBC: CPT

## 2023-12-01 PROCEDURE — 93970 EXTREMITY STUDY: CPT

## 2023-12-01 PROCEDURE — 87641 MR-STAPH DNA AMP PROBE: CPT | Performed by: NURSE PRACTITIONER

## 2023-12-01 PROCEDURE — 93880 EXTRACRANIAL BILAT STUDY: CPT

## 2023-12-01 PROCEDURE — 25010000002 MIDAZOLAM PER 1 MG: Performed by: INTERNAL MEDICINE

## 2023-12-01 PROCEDURE — 93306 TTE W/DOPPLER COMPLETE: CPT | Performed by: INTERNAL MEDICINE

## 2023-12-01 PROCEDURE — 71045 X-RAY EXAM CHEST 1 VIEW: CPT

## 2023-12-01 PROCEDURE — 99232 SBSQ HOSP IP/OBS MODERATE 35: CPT | Performed by: INTERNAL MEDICINE

## 2023-12-01 PROCEDURE — 25010000002 LIDOCAINE 1 % SOLUTION: Performed by: INTERNAL MEDICINE

## 2023-12-01 PROCEDURE — 25810000003 SODIUM CHLORIDE 0.9 % SOLUTION: Performed by: INTERNAL MEDICINE

## 2023-12-01 PROCEDURE — 94727 GAS DIL/WSHOT DETER LNG VOL: CPT

## 2023-12-01 PROCEDURE — 82948 REAGENT STRIP/BLOOD GLUCOSE: CPT

## 2023-12-01 PROCEDURE — 94799 UNLISTED PULMONARY SVC/PX: CPT

## 2023-12-01 PROCEDURE — 36415 COLL VENOUS BLD VENIPUNCTURE: CPT

## 2023-12-01 PROCEDURE — 25010000002 FENTANYL CITRATE (PF) 100 MCG/2ML SOLUTION: Performed by: INTERNAL MEDICINE

## 2023-12-01 PROCEDURE — 93458 L HRT ARTERY/VENTRICLE ANGIO: CPT | Performed by: INTERNAL MEDICINE

## 2023-12-01 PROCEDURE — B2111ZZ FLUOROSCOPY OF MULTIPLE CORONARY ARTERIES USING LOW OSMOLAR CONTRAST: ICD-10-PCS | Performed by: INTERNAL MEDICINE

## 2023-12-01 PROCEDURE — 80048 BASIC METABOLIC PNL TOTAL CA: CPT

## 2023-12-01 PROCEDURE — 94729 DIFFUSING CAPACITY: CPT

## 2023-12-01 PROCEDURE — C1894 INTRO/SHEATH, NON-LASER: HCPCS | Performed by: INTERNAL MEDICINE

## 2023-12-01 PROCEDURE — 82803 BLOOD GASES ANY COMBINATION: CPT

## 2023-12-01 PROCEDURE — 85347 COAGULATION TIME ACTIVATED: CPT

## 2023-12-01 PROCEDURE — 93005 ELECTROCARDIOGRAM TRACING: CPT | Performed by: INTERNAL MEDICINE

## 2023-12-01 PROCEDURE — 81003 URINALYSIS AUTO W/O SCOPE: CPT | Performed by: NURSE PRACTITIONER

## 2023-12-01 PROCEDURE — B2151ZZ FLUOROSCOPY OF LEFT HEART USING LOW OSMOLAR CONTRAST: ICD-10-PCS | Performed by: INTERNAL MEDICINE

## 2023-12-01 PROCEDURE — 93306 TTE W/DOPPLER COMPLETE: CPT

## 2023-12-01 PROCEDURE — 94060 EVALUATION OF WHEEZING: CPT

## 2023-12-01 RX ORDER — NICOTINE POLACRILEX 4 MG
15 LOZENGE BUCCAL
Status: DISCONTINUED | OUTPATIENT
Start: 2023-12-01 | End: 2023-12-03 | Stop reason: HOSPADM

## 2023-12-01 RX ORDER — DEXTROSE MONOHYDRATE 25 G/50ML
10-50 INJECTION, SOLUTION INTRAVENOUS
Status: DISCONTINUED | OUTPATIENT
Start: 2023-12-01 | End: 2023-12-03 | Stop reason: HOSPADM

## 2023-12-01 RX ORDER — SODIUM CHLORIDE 9 MG/ML
75 INJECTION, SOLUTION INTRAVENOUS CONTINUOUS
Status: DISCONTINUED | OUTPATIENT
Start: 2023-12-01 | End: 2023-12-03

## 2023-12-01 RX ORDER — CHLORHEXIDINE GLUCONATE ORAL RINSE 1.2 MG/ML
15 SOLUTION DENTAL EVERY 12 HOURS SCHEDULED
Qty: 30 ML | Refills: 0 | Status: COMPLETED | OUTPATIENT
Start: 2023-12-02 | End: 2023-12-03

## 2023-12-01 RX ORDER — SODIUM CHLORIDE 9 MG/ML
INJECTION, SOLUTION INTRAVENOUS
Status: COMPLETED | OUTPATIENT
Start: 2023-12-01 | End: 2023-12-01

## 2023-12-01 RX ORDER — CHLORHEXIDINE GLUCONATE 500 MG/1
1 CLOTH TOPICAL EVERY 12 HOURS
Status: ACTIVE | OUTPATIENT
Start: 2023-12-01 | End: 2023-12-02

## 2023-12-01 RX ORDER — ALPRAZOLAM 0.25 MG/1
0.25 TABLET ORAL EVERY 8 HOURS PRN
Status: DISCONTINUED | OUTPATIENT
Start: 2023-12-01 | End: 2023-12-03

## 2023-12-01 RX ORDER — LIDOCAINE HYDROCHLORIDE 10 MG/ML
INJECTION, SOLUTION INFILTRATION; PERINEURAL
Status: DISCONTINUED | OUTPATIENT
Start: 2023-12-01 | End: 2023-12-01 | Stop reason: HOSPADM

## 2023-12-01 RX ORDER — SODIUM CHLORIDE 9 MG/ML
30 INJECTION, SOLUTION INTRAVENOUS CONTINUOUS PRN
Status: DISCONTINUED | OUTPATIENT
Start: 2023-12-03 | End: 2023-12-03

## 2023-12-01 RX ORDER — MIDAZOLAM HYDROCHLORIDE 1 MG/ML
INJECTION INTRAMUSCULAR; INTRAVENOUS
Status: DISCONTINUED | OUTPATIENT
Start: 2023-12-01 | End: 2023-12-01 | Stop reason: HOSPADM

## 2023-12-01 RX ORDER — ACETAMINOPHEN 325 MG/1
650 TABLET ORAL EVERY 4 HOURS PRN
Status: DISCONTINUED | OUTPATIENT
Start: 2023-12-01 | End: 2023-12-03

## 2023-12-01 RX ORDER — ALBUTEROL SULFATE 90 UG/1
2 AEROSOL, METERED RESPIRATORY (INHALATION) ONCE
Status: COMPLETED | OUTPATIENT
Start: 2023-12-01 | End: 2023-12-01

## 2023-12-01 RX ORDER — SODIUM CHLORIDE 9 MG/ML
250 INJECTION, SOLUTION INTRAVENOUS ONCE AS NEEDED
Status: DISCONTINUED | OUTPATIENT
Start: 2023-12-01 | End: 2023-12-03

## 2023-12-01 RX ORDER — IBUPROFEN 600 MG/1
1 TABLET ORAL
Status: DISCONTINUED | OUTPATIENT
Start: 2023-12-01 | End: 2023-12-03 | Stop reason: HOSPADM

## 2023-12-01 RX ORDER — SODIUM CHLORIDE 0.9 % (FLUSH) 0.9 %
30 SYRINGE (ML) INJECTION ONCE AS NEEDED
Status: DISCONTINUED | OUTPATIENT
Start: 2023-12-01 | End: 2023-12-03 | Stop reason: HOSPADM

## 2023-12-01 RX ORDER — FENTANYL CITRATE 50 UG/ML
INJECTION, SOLUTION INTRAMUSCULAR; INTRAVENOUS
Status: DISCONTINUED | OUTPATIENT
Start: 2023-12-01 | End: 2023-12-01 | Stop reason: HOSPADM

## 2023-12-01 RX ORDER — NITROGLYCERIN 0.4 MG/1
0.4 TABLET SUBLINGUAL
Status: DISCONTINUED | OUTPATIENT
Start: 2023-12-01 | End: 2023-12-03

## 2023-12-01 RX ORDER — SODIUM CHLORIDE 9 MG/ML
30 INJECTION, SOLUTION INTRAVENOUS CONTINUOUS
Status: DISCONTINUED | OUTPATIENT
Start: 2023-12-01 | End: 2023-12-03

## 2023-12-01 RX ADMIN — SODIUM CHLORIDE 30 ML/HR: 9 INJECTION, SOLUTION INTRAVENOUS at 07:39

## 2023-12-01 RX ADMIN — SODIUM CHLORIDE 75 ML/HR: 9 INJECTION, SOLUTION INTRAVENOUS at 11:54

## 2023-12-01 RX ADMIN — ALBUTEROL SULFATE 2 PUFF: 108 AEROSOL, METERED RESPIRATORY (INHALATION) at 17:45

## 2023-12-01 NOTE — CONSULTS
Patient Care Team:  Morris Mercado PA-C as PCP - General (Physician Assistant)  Fred Lopez MD as Consulting Physician (Cardiology)  Referring Provider:  Dr. Lopez  Reason for consultation:  MV CAD    Chief complaint:  substernal discomfort/ gas pain    Subjective     History of Present Illness:  59 y/o gentleman presented today for elective heart cath following abnormal stress test in late November.  He had lung CT screening for lung cancer and had notable calcified coronary arteries.  He reports substernal discomfort/ gas pains with mild SOA that he has been treating with Gas-X.  He has HTN, HLD, and tobacco abuse.  His father  at age 37 from heart disease but also had Hodgkin's lymphoma with cobalt treatments.  His cardiac cath showed MV CAD.  Dr. Salmon was asked to evaluate pt for surgical revascularization.    Review of Systems   Respiratory:  Positive for shortness of breath.    Cardiovascular:  Positive for chest pain.   Neurological:  Negative for dizziness and light-headedness.        Past Medical History:   Diagnosis Date    Chest pain     Hyperlipidemia     Shortness of breath      Past Surgical History:   Procedure Laterality Date    COLONOSCOPY      KNEE ARTHROSCOPY Left 2020    Procedure: KNEE ARTHROSCOPY with partial and lateral  MENISCECTOMY AND CHONROPLASTY;  Surgeon: Yovany Jacobs II, MD;  Location: [facility] MAIN OR;  Service: Orthopedics;  Laterality: Left;     Family History   Problem Relation Age of Onset    Hyperlipidemia Mother     Hyperlipidemia Father     Heart attack Father      Social History     Tobacco Use    Smoking status: Former     Packs/day: .5     Types: Cigarettes     Quit date: 2023     Years since quittin.0     Passive exposure: Current    Smokeless tobacco: Never   Vaping Use    Vaping Use: Never used   Substance Use Topics    Alcohol use: Yes     Alcohol/week: 24.0 standard drinks of alcohol     Types: 24 Cans of beer per week     "Drug use: Never     Medications Prior to Admission   Medication Sig Dispense Refill Last Dose    aspirin 81 MG EC tablet Take 1 tablet by mouth Daily.   12/1/2023    metoprolol succinate XL (TOPROL-XL) 25 MG 24 hr tablet Take 1 tablet by mouth Daily. 90 tablet 3 11/30/2023    rosuvastatin (CRESTOR) 5 MG tablet Take 1 tablet by mouth Daily.   12/1/2023    nitroglycerin (NITROSTAT) 0.4 MG SL tablet Place 1 tablet under the tongue Every 5 (Five) Minutes As Needed for Chest Pain. Take no more than 3 doses in 15 minutes.   More than a month        Allergies:  Patient has no known allergies.    Objective      Vital Signs  Temp:  [97.6 °F (36.4 °C)] 97.6 °F (36.4 °C)  Heart Rate:  [54-59] 56  Resp:  [12-22] 12  BP: (100-137)/(59-97) 137/78    Flowsheet Rows      Flowsheet Row First Filed Value   Admission Height 170.2 cm (67\") Documented at 12/01/2023 0715   Admission Weight 90.9 kg (200 lb 6.4 oz) Documented at 12/01/2023 0715          170.2 cm (67\")    Physical Exam  Vitals and nursing note reviewed.   Constitutional:       General: He is awake.      Appearance: Normal appearance. He is well-developed and well-groomed.      Comments: Seen in [facility] with mother/daughter at bedside   HENT:      Head: Normocephalic and atraumatic.      Nose: Nose normal.      Mouth/Throat:      Lips: Pink.      Mouth: Mucous membranes are moist.      Pharynx: Uvula midline.   Eyes:      General: Lids are normal. No scleral icterus.     Extraocular Movements: Extraocular movements intact.      Conjunctiva/sclera: Conjunctivae normal.      Pupils: Pupils are equal, round, and reactive to light.   Neck:      Thyroid: No thyroid mass or thyromegaly.      Vascular: Normal carotid pulses. No carotid bruit, hepatojugular reflux or JVD.      Trachea: Trachea normal.   Cardiovascular:      Rate and Rhythm: Normal rate. Bradycardia present.      Pulses:           Carotid pulses are 2+ on the right side and 2+ on the left side.       Radial pulses are " 2+ on the right side and 2+ on the left side.        Femoral pulses are 2+ on the right side and 2+ on the left side.       Popliteal pulses are 2+ on the right side and 2+ on the left side.        Dorsalis pedis pulses are 2+ on the right side and 2+ on the left side.        Posterior tibial pulses are 2+ on the right side and 2+ on the left side.      Heart sounds: Normal heart sounds. No murmur heard.     Comments: Tele:  SB 50s  Right femoral cath site soft without hematoma noted  Pulmonary:      Effort: Pulmonary effort is normal.      Breath sounds: Normal breath sounds.      Comments: 97% RA  Abdominal:      General: Abdomen is protuberant. Bowel sounds are normal. There is no distension.      Palpations: Abdomen is soft.      Tenderness: There is no abdominal tenderness.   Musculoskeletal:      Cervical back: Neck supple.      Comments: Gait steady and strong without use of assistive devices   Lymphadenopathy:      Cervical: No cervical adenopathy.      Upper Body:      Right upper body: No supraclavicular adenopathy.      Left upper body: No supraclavicular adenopathy.   Skin:     General: Skin is warm and dry.      Capillary Refill: Capillary refill takes less than 2 seconds.      Findings: No erythema or rash.      Nails: There is no clubbing.   Neurological:      Mental Status: He is alert and oriented to person, place, and time.      GCS: GCS eye subscore is 4. GCS verbal subscore is 5. GCS motor subscore is 6.   Psychiatric:         Attention and Perception: Attention and perception normal.         Mood and Affect: Mood and affect normal.         Speech: Speech normal.         Behavior: Behavior normal. Behavior is cooperative.         Thought Content: Thought content normal.         Cognition and Memory: Cognition and memory normal.         Judgment: Judgment normal.         Results Review:   Lab Results (last 24 hours)       Procedure Component Value Units Date/Time    POC Activated Clotting Time  [[id number]]  (Abnormal) Collected: 12/01/23 1118    Specimen: Arterial Blood Updated: 12/01/23 1153     Activated Clotting Time  >1,000 Seconds      Comment: Serial Number: [id number]:  [id number]       Blood Gas, Arterial - [[id number]]  (Abnormal) Collected: 12/01/23 1114    Specimen: Arterial Blood Updated: 12/01/23 1136     Site Arterial Line     Valentino's Test N/A     pH, Arterial 7.375 pH units      pCO2, Arterial 41.7 mm Hg      pO2, Arterial 67.8 mm Hg      HCO3, Arterial 24.4 mmol/L      Base Excess, Arterial -0.9 mmol/L      Comment: Serial Number: [id number]:  [id number]        O2 Saturation, Arterial 92.7 %      CO2 Content 25.7 mmol/L      Barometric Pressure for Blood Gas --     Comment: N/A        Modality Room Air     FIO2 21 %      Hemodilution No                Assessment & Plan     Cardiac cath by Dr. Lopez 12/1/2023  LVEDP:8 Estimated EF %:60  Initial Aortic Pressure: 110/70  AV Gradient: No gradient    Wall Motion:   Dominance: [ ] Left [ ] Right [ x] Co-Dominant     Coronary Arteriography: (Please Code highest degree of stenosis)  Left Main %: 70 to 80% distal  Proximal LAD %: 100% with a left to left collaterals  Mid/Distal LAD %: 100% with left to left collaterals   LCX %: 70% to 80% ostial to proximal   ramus:   RCA %: 90 to 99% proximal and mid  Lima %:   SVG(s) %:       CAD (coronary artery disease)    Angina at rest    Abnormal nuclear stress test      Assessment & Plan    - MV CAD including left main disease, EF 60% (stress test)--surgical workup in progress  - HTN--stable  - HLD--statin  - Preop sinus bradycardia--HR 50s  - Tobacco abuse--35 pack year hx, PFTs pending    Dr. Salmon reviewed cath and d/w pt/mother/darci at bedside.  Plans for CABG on Sunday, 12/3.  Preop orders placed.  PFTs, echo, and vascular studies have been ordered.  COVID screen tomorrow.  Kefzol ordered for preop antibiotic.    Thank you for allowing us to participate in the care of this patient.      Tammy CLIFTON  PEDRO Diop  12/01/23  12:23 EST    **all problems new to this examiner  **EKG and CXR independently reviewed and interpreted          no guarding/nontender

## 2023-12-01 NOTE — ANESTHESIA PREPROCEDURE EVALUATION
Anesthesia Evaluation     NPO Solid Status: > 8 hours  NPO Liquid Status: > 8 hours           Airway   Mallampati: I  TM distance: >3 FB  Neck ROM: full  No difficulty expected  Dental - normal exam     Pulmonary - normal exam   (+) ,shortness of breath  Cardiovascular - normal exam    (+) CAD, angina, hyperlipidemia      Neuro/Psych  GI/Hepatic/Renal/Endo      Musculoskeletal     Abdominal  - normal exam    Bowel sounds: normal.   Substance History   (+) alcohol use     OB/GYN          Other        ROS/Med Hx Other: 3V CAD PRESERVED LVFXN  NORMAL CAROTIDS  HEAVY ETOH              Anesthesia Plan    ASA 4     general, [place], CVL and PAC     intravenous induction   Postoperative Plan: Expected vent after surgery  Anesthetic plan, risks, benefits, and alternatives have been provided, discussed and informed consent has been obtained with: patient.  Pre-procedure education provided  Use of blood products discussed with patient  Consented to blood products.      CODE STATUS:    Level Of Support Discussed With: Patient  Code Status (Patient has no pulse and is not breathing): CPR (Attempt to Resuscitate)  Medical Interventions (Patient has pulse or is breathing): Full Support

## 2023-12-01 NOTE — PROGRESS NOTES
CARDIOLOGY PROGRESS NOTE:    John Paul Tineo  58 y.o.  male  1965  [id number]      Referring Provider: Cardiac surgeon    Reason for follow-up: Coronary artery disease     Patient Care Team:  Morris Mercado PA-C as PCP - General (Physician Assistant)  Fred Lopez MD as Consulting Physician (Cardiology)    Subjective .  Patient does not have any chest pain today    Objective lying in bed comfortably     Review of Systems   Constitutional: Negative for fever and malaise/fatigue.   HENT:  Negative for ear pain and nosebleeds.    Eyes:  Negative for blurred vision and double vision.   Cardiovascular:  Negative for chest pain, dyspnea on exertion and palpitations.   Respiratory:  Negative for cough and shortness of breath.    Skin:  Negative for rash.   Musculoskeletal:  Negative for joint pain.   Gastrointestinal:  Negative for abdominal pain, nausea and vomiting.   Neurological:  Negative for focal weakness and headaches.   Psychiatric/Behavioral:  Negative for depression. The patient is not nervous/anxious.    All other systems reviewed and are negative.      Allergies: Patient has no known allergies.    Scheduled Meds:[START ON 12/3/2023] ceFAZolin, 2,000 mg, Intravenous, Once  [START ON 12/2/2023] chlorhexidine, 15 mL, Mouth/Throat, Q12H  Chlorhexidine Gluconate Cloth, 1 application , Topical, Q12H  [START ON 12/3/2023] metoprolol tartrate, 12.5 mg, Oral, Once  [START ON 12/2/2023] mupirocin, 1 application , Each Nare, Q12H      Continuous Infusions:[START ON 12/3/2023] insulin, 0-100 Units/hr  sodium chloride, 30 mL/hr, Last Rate: 30 mL/hr (12/01/23 0739)  sodium chloride, 75 mL/hr, Last Rate: 75 mL/hr (12/01/23 1154)  [START ON 12/3/2023] sodium chloride, 30 mL/hr      PRN Meds:.  acetaminophen    ALPRAZolam    atropine    dextrose    dextrose    glucagon (human recombinant)    nitroglycerin    sodium chloride    sodium chloride    [START ON 12/3/2023] sodium chloride        VITAL SIGNS  Vitals:     "12/01/23 1245 12/01/23 1300 12/01/23 1315 12/01/23 1330   BP:  134/80 139/78 129/79   BP Location:       Patient Position:       Pulse: 68 64 65 67   Resp:       Temp:       TempSrc:       SpO2: 95% 95% 91% 96%   Weight:       Height:           Flowsheet Rows      Flowsheet Row First Filed Value   Admission Height 170.2 cm (67\") Documented at 12/01/2023 0715   Admission Weight 90.9 kg (200 lb 6.4 oz) Documented at 12/01/2023 0715             TELEMETRY: Sinus rhythm    Physical Exam:  Constitutional:       Appearance: Well-developed.   Eyes:      General: No scleral icterus.     Conjunctiva/sclera: Conjunctivae normal.      Pupils: Pupils are equal, round, and reactive to light.   HENT:      Head: Normocephalic and atraumatic.   Neck:      Vascular: No carotid bruit or JVD.   Pulmonary:      Effort: Pulmonary effort is normal.      Breath sounds: Normal breath sounds. No wheezing. No rales.   Cardiovascular:      Normal rate. Regular rhythm.   Pulses:     Intact distal pulses.   Abdominal:      General: Bowel sounds are normal.      Palpations: Abdomen is soft.   Musculoskeletal: Normal range of motion.      Cervical back: Normal range of motion and neck supple. Skin:     General: Skin is warm and dry.      Findings: No rash.   Neurological:      Mental Status: Alert.      Comments: No focal deficits          Results Review:   I reviewed the patient's new clinical results.  Lab Results (last 24 hours)       Procedure Component Value Units Date/Time    POC Activated Clotting Time [[id number]]  (Abnormal) Collected: 12/01/23 1118    Specimen: Arterial Blood Updated: 12/01/23 1153     Activated Clotting Time  >1,000 Seconds      Comment: Serial Number: [id number]:  [id number]       Blood Gas, Arterial - [[id number]]  (Abnormal) Collected: 12/01/23 1114    Specimen: Arterial Blood Updated: 12/01/23 1136     Site Arterial Line     Valentino's Test N/A     pH, Arterial 7.375 pH units      pCO2, Arterial 41.7 mm Hg      pO2, " Arterial 67.8 mm Hg      HCO3, Arterial 24.4 mmol/L      Base Excess, Arterial -0.9 mmol/L      Comment: Serial Number: [id number]:  [id number]        O2 Saturation, Arterial 92.7 %      CO2 Content 25.7 mmol/L      Barometric Pressure for Blood Gas --     Comment: N/A        Modality Room Air     FIO2 21 %      Hemodilution No            Imaging Results (Last 24 Hours)       Procedure Component Value Units Date/Time    XR Chest 1 View [[id number]] Collected: 12/01/23 1333     Updated: 12/01/23 1335    Narrative:      XR CHEST 1 VW    Date of Exam: 12/1/2023 1:14 PM EST    Indication: preop CABG  preop cardiac surgery    Comparison: CT chest 10/17/2023    Findings:  No acute airspace disease. Minimal linear scarring in the left lower lobe. Heart size is normal. No pleural effusion or pneumothorax. No acute or suspicious osseous abnormality.      Impression:      Impression:  No acute chest finding.      Electronically Signed: Mindy Longoria MD    12/1/2023 1:33 PM EST    Workstation ID: [id number]            EKG      I personally viewed and interpreted the patient's EKG/Telemetry data:    ECHOCARDIOGRAM:       STRESS MYOVIEW:  Results for orders placed during the hospital encounter of 11/28/23    Stress Test With Myocardial Perfusion One Day    Interpretation Summary    Left ventricular ejection fraction is normal (Calculated EF = 60%).    High risk for ischemic heart disease.    Myocardial perfusion imaging indicates a large-sized, severe area of ischemia located in the anterior wall, apex and septal wall.    Impressions are consistent with a high risk study.       CARDIAC CATHETERIZATION:  Results for orders placed during the hospital encounter of 12/01/23    Cardiac Catheterization/Vascular Study       OTHER:         Assessment & Plan     Angina with abnormal Myoview  Patient had chest pain with risk factors for coronary disease and had an abnormal Myoview and hence he underwent cardiac catheterization  Patient had  significant left main and three-vessel coronary disease and is scheduled for coronary bypass surgery  Cardiac surgeons have seen the patient is having workup done for pre-CABG.  Patient will have a echocardiogram performed  Patient is on aspirin beta-blocker statins.    Hypertension  Patient is on beta-blockers    Hyperlipidemia  Patient is on Crestor and his lipid levels are followed by the primary care doctor    I discussed the patients findings and my recommendations with patient and nurse    Fred Lopez MD  12/01/23  14:21 EST

## 2023-12-02 LAB
ABO GROUP BLD: NORMAL
ALBUMIN SERPL-MCNC: 4 G/DL (ref 3.5–5.2)
ALBUMIN/GLOB SERPL: 1.5 G/DL
ALP SERPL-CCNC: 73 U/L (ref 39–117)
ALT SERPL W P-5'-P-CCNC: 33 U/L (ref 1–41)
ANION GAP SERPL CALCULATED.3IONS-SCNC: 8 MMOL/L (ref 5–15)
APTT PPP: 30.4 SECONDS (ref 24–31)
AST SERPL-CCNC: 21 U/L (ref 1–40)
BASOPHILS # BLD AUTO: 0.1 10*3/MM3 (ref 0–0.2)
BASOPHILS NFR BLD AUTO: 1.1 % (ref 0–1.5)
BH CV ECHO MEAS - ACS: 2.4 CM
BH CV ECHO MEAS - AO MAX PG: 8.2 MMHG
BH CV ECHO MEAS - AO MEAN PG: 4 MMHG
BH CV ECHO MEAS - AO V2 MAX: 143 CM/SEC
BH CV ECHO MEAS - AO V2 VTI: 30 CM
BH CV ECHO MEAS - AVA(I,D): 2.7 CM2
BH CV ECHO MEAS - EDV(CUBED): 125 ML
BH CV ECHO MEAS - EDV(MOD-SP4): 129 ML
BH CV ECHO MEAS - EF(MOD-BP): 62 %
BH CV ECHO MEAS - EF(MOD-SP4): 62.2 %
BH CV ECHO MEAS - ESV(CUBED): 32.8 ML
BH CV ECHO MEAS - ESV(MOD-SP4): 48.7 ML
BH CV ECHO MEAS - FS: 36 %
BH CV ECHO MEAS - IVS/LVPW: 1 CM
BH CV ECHO MEAS - IVSD: 1.1 CM
BH CV ECHO MEAS - LA DIMENSION: 3.7 CM
BH CV ECHO MEAS - LAT PEAK E' VEL: 12.7 CM/SEC
BH CV ECHO MEAS - LV DIASTOLIC VOL/BSA (35-75): 63.8 CM2
BH CV ECHO MEAS - LV MASS(C)D: 207.1 GRAMS
BH CV ECHO MEAS - LV MAX PG: 6.5 MMHG
BH CV ECHO MEAS - LV MEAN PG: 3 MMHG
BH CV ECHO MEAS - LV SYSTOLIC VOL/BSA (12-30): 24.1 CM2
BH CV ECHO MEAS - LV V1 MAX: 127 CM/SEC
BH CV ECHO MEAS - LV V1 VTI: 26.1 CM
BH CV ECHO MEAS - LVIDD: 5 CM
BH CV ECHO MEAS - LVIDS: 3.2 CM
BH CV ECHO MEAS - LVOT AREA: 3.1 CM2
BH CV ECHO MEAS - LVOT DIAM: 2 CM
BH CV ECHO MEAS - LVPWD: 1.1 CM
BH CV ECHO MEAS - MED PEAK E' VEL: 9.2 CM/SEC
BH CV ECHO MEAS - MV A MAX VEL: 67.1 CM/SEC
BH CV ECHO MEAS - MV DEC SLOPE: 153 CM/SEC2
BH CV ECHO MEAS - MV DEC TIME: 0.18 SEC
BH CV ECHO MEAS - MV E MAX VEL: 69.2 CM/SEC
BH CV ECHO MEAS - MV E/A: 1.03
BH CV ECHO MEAS - MV MAX PG: 3.4 MMHG
BH CV ECHO MEAS - MV MEAN PG: 1 MMHG
BH CV ECHO MEAS - MV P1/2T: 171.5 MSEC
BH CV ECHO MEAS - MV V2 VTI: 35.4 CM
BH CV ECHO MEAS - MVA(P1/2T): 1.28 CM2
BH CV ECHO MEAS - MVA(VTI): 2.32 CM2
BH CV ECHO MEAS - PA ACC TIME: 0.14 SEC
BH CV ECHO MEAS - PA V2 MAX: 99.1 CM/SEC
BH CV ECHO MEAS - RV MAX PG: 1.55 MMHG
BH CV ECHO MEAS - RV V1 MAX: 62.3 CM/SEC
BH CV ECHO MEAS - RV V1 VTI: 16.9 CM
BH CV ECHO MEAS - SI(MOD-SP4): 39.7 ML/M2
BH CV ECHO MEAS - SV(LVOT): 82 ML
BH CV ECHO MEAS - SV(MOD-SP4): 80.3 ML
BH CV ECHO MEAS - TAPSE (>1.6): 2.5 CM
BH CV ECHO MEAS - TR MAX PG: 21 MMHG
BH CV ECHO MEAS - TR MAX VEL: 229 CM/SEC
BH CV ECHO MEASUREMENTS AVERAGE E/E' RATIO: 6.32
BH CV ECHO SHUNT ASSESSMENT PERFORMED (HIDDEN SCRIPTING): 1
BH CV XLRA - TDI S': 19 CM/SEC
BILIRUB SERPL-MCNC: 0.6 MG/DL (ref 0–1.2)
BLD GP AB SCN SERPL QL: NEGATIVE
BUN SERPL-MCNC: 11 MG/DL (ref 6–20)
BUN/CREAT SERPL: 15.1 (ref 7–25)
CALCIUM SPEC-SCNC: 9.4 MG/DL (ref 8.6–10.5)
CHLORIDE SERPL-SCNC: 103 MMOL/L (ref 98–107)
CHOLEST SERPL-MCNC: 165 MG/DL (ref 0–200)
CLOSE TME COLL+ADP + EPINEP PNL BLD: 97 % (ref 86–100)
CO2 SERPL-SCNC: 27 MMOL/L (ref 22–29)
CREAT SERPL-MCNC: 0.73 MG/DL (ref 0.76–1.27)
DEPRECATED RDW RBC AUTO: 46.8 FL (ref 37–54)
EGFRCR SERPLBLD CKD-EPI 2021: 106.1 ML/MIN/1.73
EOSINOPHIL # BLD AUTO: 0.1 10*3/MM3 (ref 0–0.4)
EOSINOPHIL NFR BLD AUTO: 2.1 % (ref 0.3–6.2)
ERYTHROCYTE [DISTWIDTH] IN BLOOD BY AUTOMATED COUNT: 12.8 % (ref 12.3–15.4)
GLOBULIN UR ELPH-MCNC: 2.6 GM/DL
GLUCOSE SERPL-MCNC: 113 MG/DL (ref 65–99)
HBA1C MFR BLD: 5.5 % (ref 4.8–5.6)
HCT VFR BLD AUTO: 44.1 % (ref 37.5–51)
HDLC SERPL-MCNC: 44 MG/DL (ref 40–60)
HGB BLD-MCNC: 14.9 G/DL (ref 13–17.7)
HOLD SPECIMEN: NORMAL
INR PPP: 0.95 (ref 0.93–1.1)
LDLC SERPL CALC-MCNC: 106 MG/DL (ref 0–100)
LDLC/HDLC SERPL: 2.4 {RATIO}
LEFT ATRIUM VOLUME INDEX: 25.2 ML/M2
LYMPHOCYTES # BLD AUTO: 1.7 10*3/MM3 (ref 0.7–3.1)
LYMPHOCYTES NFR BLD AUTO: 24.6 % (ref 19.6–45.3)
MCH RBC QN AUTO: 33.3 PG (ref 26.6–33)
MCHC RBC AUTO-ENTMCNC: 33.7 G/DL (ref 31.5–35.7)
MCV RBC AUTO: 98.6 FL (ref 79–97)
MONOCYTES # BLD AUTO: 0.6 10*3/MM3 (ref 0.1–0.9)
MONOCYTES NFR BLD AUTO: 9.2 % (ref 5–12)
NEUTROPHILS NFR BLD AUTO: 4.4 10*3/MM3 (ref 1.7–7)
NEUTROPHILS NFR BLD AUTO: 63 % (ref 42.7–76)
NRBC BLD AUTO-RTO: 0.1 /100 WBC (ref 0–0.2)
PLATELET # BLD AUTO: 237 10*3/MM3 (ref 140–450)
PMV BLD AUTO: 9 FL (ref 6–12)
POTASSIUM SERPL-SCNC: 4.5 MMOL/L (ref 3.5–5.2)
PROT SERPL-MCNC: 6.6 G/DL (ref 6–8.5)
PROTHROMBIN TIME: 10.4 SECONDS (ref 9.6–11.7)
QT INTERVAL: 416 MS
QTC INTERVAL: 401 MS
RBC # BLD AUTO: 4.47 10*6/MM3 (ref 4.14–5.8)
RH BLD: POSITIVE
SINUS: 3.6 CM
SODIUM SERPL-SCNC: 138 MMOL/L (ref 136–145)
T&S EXPIRATION DATE: NORMAL
TRIGL SERPL-MCNC: 76 MG/DL (ref 0–150)
VLDLC SERPL-MCNC: 15 MG/DL (ref 5–40)
WBC NRBC COR # BLD AUTO: 7 10*3/MM3 (ref 3.4–10.8)

## 2023-12-02 PROCEDURE — 86901 BLOOD TYPING SEROLOGIC RH(D): CPT

## 2023-12-02 PROCEDURE — 86900 BLOOD TYPING SEROLOGIC ABO: CPT

## 2023-12-02 PROCEDURE — 85576 BLOOD PLATELET AGGREGATION: CPT | Performed by: NURSE PRACTITIONER

## 2023-12-02 PROCEDURE — 86850 RBC ANTIBODY SCREEN: CPT | Performed by: NURSE PRACTITIONER

## 2023-12-02 PROCEDURE — 86901 BLOOD TYPING SEROLOGIC RH(D): CPT | Performed by: NURSE PRACTITIONER

## 2023-12-02 PROCEDURE — 86900 BLOOD TYPING SEROLOGIC ABO: CPT | Performed by: NURSE PRACTITIONER

## 2023-12-02 PROCEDURE — 99232 SBSQ HOSP IP/OBS MODERATE 35: CPT | Performed by: INTERNAL MEDICINE

## 2023-12-02 PROCEDURE — 80053 COMPREHEN METABOLIC PANEL: CPT | Performed by: NURSE PRACTITIONER

## 2023-12-02 PROCEDURE — 85730 THROMBOPLASTIN TIME PARTIAL: CPT | Performed by: NURSE PRACTITIONER

## 2023-12-02 PROCEDURE — 80061 LIPID PANEL: CPT | Performed by: NURSE PRACTITIONER

## 2023-12-02 PROCEDURE — 85610 PROTHROMBIN TIME: CPT | Performed by: NURSE PRACTITIONER

## 2023-12-02 PROCEDURE — 85025 COMPLETE CBC W/AUTO DIFF WBC: CPT | Performed by: NURSE PRACTITIONER

## 2023-12-02 PROCEDURE — 83036 HEMOGLOBIN GLYCOSYLATED A1C: CPT | Performed by: NURSE PRACTITIONER

## 2023-12-02 PROCEDURE — 86923 COMPATIBILITY TEST ELECTRIC: CPT

## 2023-12-02 RX ORDER — VANCOMYCIN/0.9 % SOD CHLORIDE 1.5G/250ML
15 PLASTIC BAG, INJECTION (ML) INTRAVENOUS ONCE
Status: COMPLETED | OUTPATIENT
Start: 2023-12-03 | End: 2023-12-03

## 2023-12-02 RX ORDER — ASPIRIN 81 MG/1
81 TABLET, CHEWABLE ORAL DAILY
Status: DISCONTINUED | OUTPATIENT
Start: 2023-12-02 | End: 2023-12-03

## 2023-12-02 RX ADMIN — MUPIROCIN 1 APPLICATION: 20 OINTMENT TOPICAL at 20:53

## 2023-12-02 RX ADMIN — 0.12% CHLORHEXIDINE GLUCONATE 15 ML: 1.2 RINSE ORAL at 20:53

## 2023-12-02 RX ADMIN — ASPIRIN 81 MG CHEWABLE TABLET 81 MG: 81 TABLET CHEWABLE at 20:53

## 2023-12-02 NOTE — PROGRESS NOTES
CARDIOLOGY PROGRESS NOTE:    John Paul Tineo  58 y.o.  male  1965  [id number]      Referring Provider: Cardiac surgeon    Reason for follow-up: Coronary artery disease     Patient Care Team:  Morris Mercado PA-C as PCP - General (Physician Assistant)  Fred Lopez MD as Consulting Physician (Cardiology)    Subjective .  No chest pain or shortness of breath    Objective lying in bed comfortably     Review of Systems   Constitutional: Negative for fever and malaise/fatigue.   HENT:  Negative for ear pain and nosebleeds.    Eyes:  Negative for blurred vision and double vision.   Cardiovascular:  Negative for chest pain, dyspnea on exertion and palpitations.   Respiratory:  Negative for cough and shortness of breath.    Skin:  Negative for rash.   Musculoskeletal:  Negative for joint pain.   Gastrointestinal:  Negative for abdominal pain, nausea and vomiting.   Neurological:  Negative for focal weakness and headaches.   Psychiatric/Behavioral:  Negative for depression. The patient is not nervous/anxious.    All other systems reviewed and are negative.      Allergies: Patient has no known allergies.    Scheduled Meds:[START ON 12/3/2023] ceFAZolin, 2,000 mg, Intravenous, Once  chlorhexidine, 15 mL, Mouth/Throat, Q12H  Chlorhexidine Gluconate Cloth, 1 application , Topical, Q12H  [START ON 12/3/2023] metoprolol tartrate, 12.5 mg, Oral, Once  mupirocin, 1 application , Each Nare, BID  [START ON 12/3/2023] vancomycin, 15 mg/kg, Intravenous, Once      Continuous Infusions:[START ON 12/3/2023] insulin, 0-100 Units/hr  sodium chloride, 30 mL/hr, Last Rate: 30 mL/hr (12/01/23 0739)  sodium chloride, 75 mL/hr, Last Rate: 75 mL/hr (12/01/23 1154)  [START ON 12/3/2023] sodium chloride, 30 mL/hr      PRN Meds:.  acetaminophen    ALPRAZolam    atropine    dextrose    dextrose    glucagon (human recombinant)    nitroglycerin    sodium chloride    sodium chloride    [START ON 12/3/2023] sodium chloride        VITAL  "SIGNS  Vitals:    12/02/23 0645 12/02/23 0700 12/02/23 0800 12/02/23 1200   BP:   121/66    BP Location:   Right arm    Patient Position:   Sitting    Pulse: 52 54 65 56   Resp:   17 21   Temp:   97.8 °F (36.6 °C) 98 °F (36.7 °C)   TempSrc:   Oral Oral   SpO2: 96% 95% 90% 96%   Weight:       Height:           Flowsheet Rows      Flowsheet Row First Filed Value   Admission Height 170.2 cm (67\") Documented at 12/01/2023 0715   Admission Weight 90.9 kg (200 lb 6.4 oz) Documented at 12/01/2023 0715             TELEMETRY: Sinus rhythm    Physical Exam:  Constitutional:       Appearance: Well-developed.   Eyes:      General: No scleral icterus.     Conjunctiva/sclera: Conjunctivae normal.      Pupils: Pupils are equal, round, and reactive to light.   HENT:      Head: Normocephalic and atraumatic.   Neck:      Vascular: No carotid bruit or JVD.   Pulmonary:      Effort: Pulmonary effort is normal.      Breath sounds: Normal breath sounds. No wheezing. No rales.   Cardiovascular:      Normal rate. Regular rhythm.   Pulses:     Intact distal pulses.   Abdominal:      General: Bowel sounds are normal.      Palpations: Abdomen is soft.   Musculoskeletal: Normal range of motion.      Cervical back: Normal range of motion and neck supple. Skin:     General: Skin is warm and dry.      Findings: No rash.   Neurological:      Mental Status: Alert.      Comments: No focal deficits          Results Review:   I reviewed the patient's new clinical results.  Lab Results (last 24 hours)       Procedure Component Value Units Date/Time    Lipid Panel [[id number]]  (Abnormal) Collected: 12/02/23 0606    Specimen: Blood Updated: 12/02/23 1345     Total Cholesterol 165 mg/dL      Triglycerides 76 mg/dL      HDL Cholesterol 44 mg/dL      LDL Cholesterol  106 mg/dL      VLDL Cholesterol 15 mg/dL      LDL/HDL Ratio 2.40    Narrative:      Cholesterol Reference Ranges  (U.[place] Department of Health and Human Services ATP III " Classifications)    Desirable          <200 mg/dL  Borderline High    200-239 mg/dL  High Risk          >240 mg/dL      Triglyceride Reference Ranges  (U.S. Department of Health and Human Services ATP III Classifications)    Normal           <150 mg/dL  Borderline High  150-199 mg/dL  High             200-499 mg/dL  Very High        >500 mg/dL    HDL Reference Ranges  (U.S. Department of Health and Human Services ATP III Classifications)    Low     <40 mg/dl (major risk factor for CHD)  High    >60 mg/dl ('negative' risk factor for CHD)        LDL Reference Ranges  (U.S. Department of Health and Human Services ATP III Classifications)    Optimal          <100 mg/dL  Near Optimal     100-129 mg/dL  Borderline High  130-159 mg/dL  High             160-189 mg/dL  Very High        >189 mg/dL    Hemoglobin A1c [[id number]]  (Normal) Collected: 12/02/23 0606    Specimen: Blood Updated: 12/02/23 1316     Hemoglobin A1C 5.50 %     Narrative:      Hemoglobin A1C Ranges:    Increased Risk for Diabetes  5.7% to 6.4%  Diabetes                     >= 6.5%  Diabetic Goal                < 7.0%    Extra Tubes [[id number]] Collected: 12/02/23 0606    Specimen: Blood, Venous Line Updated: 12/02/23 0715    Narrative:      The following orders were created for panel order Extra Tubes.  Procedure                               Abnormality         Status                     ---------                               -----------         ------                     Gold Top - SST[[id number]]                                   Final result                 Please view results for these tests on the individual orders.    Gold Top - SST [[id number]] Collected: 12/02/23 0606    Specimen: Blood Updated: 12/02/23 0715     Extra Tube Hold for add-ons.     Comment: Auto resulted.       Comprehensive Metabolic Panel [[id number]]  (Abnormal) Collected: 12/02/23 0606    Specimen: Blood Updated: 12/02/23 0641     Glucose 113 mg/dL      BUN 11 mg/dL       Creatinine 0.73 mg/dL      Sodium 138 mmol/L      Potassium 4.5 mmol/L      Chloride 103 mmol/L      CO2 27.0 mmol/L      Calcium 9.4 mg/dL      Total Protein 6.6 g/dL      Albumin 4.0 g/dL      ALT (SGPT) 33 U/L      AST (SGOT) 21 U/L      Alkaline Phosphatase 73 U/L      Total Bilirubin 0.6 mg/dL      Globulin 2.6 gm/dL      A/G Ratio 1.5 g/dL      BUN/Creatinine Ratio 15.1     Anion Gap 8.0 mmol/L      eGFR 106.1 mL/min/1.73     Narrative:      GFR Normal >60  Chronic Kidney Disease <60  Kidney Failure <15      CBC & Differential [[id number]]  (Abnormal) Collected: 12/02/23 0606    Specimen: Blood Updated: 12/02/23 0628    Narrative:      The following orders were created for panel order CBC & Differential.  Procedure                               Abnormality         Status                     ---------                               -----------         ------                     CBC Auto Differential[[id number]]        Abnormal            Final result                 Please view results for these tests on the individual orders.    CBC Auto Differential [[id number]]  (Abnormal) Collected: 12/02/23 0606    Specimen: Blood Updated: 12/02/23 0628     WBC 7.00 10*3/mm3      RBC 4.47 10*6/mm3      Hemoglobin 14.9 g/dL      Hematocrit 44.1 %      MCV 98.6 fL      MCH 33.3 pg      MCHC 33.7 g/dL      RDW 12.8 %      RDW-SD 46.8 fl      MPV 9.0 fL      Platelets 237 10*3/mm3      Neutrophil % 63.0 %      Lymphocyte % 24.6 %      Monocyte % 9.2 %      Eosinophil % 2.1 %      Basophil % 1.1 %      Neutrophils, Absolute 4.40 10*3/mm3      Lymphocytes, Absolute 1.70 10*3/mm3      Monocytes, Absolute 0.60 10*3/mm3      Eosinophils, Absolute 0.10 10*3/mm3      Basophils, Absolute 0.10 10*3/mm3      nRBC 0.1 /100 WBC     Protime-INR [[id number]]  (Normal) Collected: 12/02/23 0606    Specimen: Blood Updated: 12/02/23 0624     Protime 10.4 Seconds      INR 0.95    aPTT [[id number]]  (Normal) Collected: 12/02/23 0606    Specimen: Blood  Updated: 12/02/23 0624     PTT 30.4 seconds     Platelet Function ADP [[id number]]  (Normal) Collected: 12/02/23 0606    Specimen: Blood Updated: 12/02/23 0615     ADP Aggregation, % Platelet 97 %     COVID PRE-OP / PRE-PROCEDURE SCREENING ORDER (NO ISOLATION) - Swab, Nasopharynx [[id number]]  (Normal) Collected: 12/01/23 2050    Specimen: Swab from Nasopharynx Updated: 12/01/23 2120    Narrative:      The following orders were created for panel order COVID PRE-OP / PRE-PROCEDURE SCREENING ORDER (NO ISOLATION) - Swab, Nasopharynx.  Procedure                               Abnormality         Status                     ---------                               -----------         ------                     COVID-19,CEPHEID/GERMÁN,CO...[[id number]]  Normal              Final result                 Please view results for these tests on the individual orders.    COVID-19,CEPHEID/GERMÁN,COR/AZAR/PAD/[facility] IN-HOUSE,NP SWAB IN TRANSPORT MEDIA 1 HR TAT, RT-PCR - Swab, Nasopharynx [[id number]]  (Normal) Collected: 12/01/23 2050    Specimen: Swab from Nasopharynx Updated: 12/01/23 2120     COVID19 Not Detected    Narrative:      Fact sheet for providers: https://www.fda.gov/media/382805/download     Fact sheet for patients: https://www.fda.gov/media/240509/download  Fact sheet for providers: https://www.fda.gov/media/180068/download    Fact sheet for patients: https://www.fda.gov/media/259229/download    Test performed by PCR.    Urinalysis With Culture If Indicated - Urine, Clean Catch [[id number]]  (Normal) Collected: 12/01/23 2050    Specimen: Urine, Clean Catch Updated: 12/01/23 2102     Color, UA Yellow     Appearance, UA Clear     pH, UA 5.5     Specific Gravity, UA 1.020     Glucose, UA Negative     Ketones, UA Negative     Bilirubin, UA Negative     Blood, UA Negative     Protein, UA Negative     Leuk Esterase, UA Negative     Nitrite, UA Negative     Urobilinogen, UA 1.0 E.U./dL    Narrative:      In absence of clinical  symptoms, the presence of pyuria, bacteria, and/or nitrites on the urinalysis result does not correlate with infection.  Urine microscopic not indicated.    POC Glucose Once [[id number]]  (Normal) Collected: 12/01/23 2003    Specimen: Blood Updated: 12/01/23 2005     Glucose 91 mg/dL      Comment: Serial Number: [id number]:  [id number]       MRSA Screen, PCR (Inpatient) - Swab, Nares [[id number]]  (Abnormal) Collected: 12/01/23 1841    Specimen: Swab from Nares Updated: 12/01/23 2003     MRSA PCR MRSA Detected    Narrative:      The negative predictive value of this diagnostic test is high and should only be used to consider de-escalating anti-MRSA therapy. A positive result may indicate colonization with MRSA and must be correlated clinically.            Imaging Results (Last 24 Hours)       ** No results found for the last 24 hours. **            EKG      I personally viewed and interpreted the patient's EKG/Telemetry data:    ECHOCARDIOGRAM:  Results for orders placed during the hospital encounter of 12/01/23    Adult Transthoracic Echo Complete w/ Color, Spectral and Contrast if Necessary Per Protocol    Interpretation Summary    Left ventricular ejection fraction appears to be 61 - 65%.    Saline test results are negative.       STRESS MYOVIEW:  Results for orders placed during the hospital encounter of 11/28/23    Stress Test With Myocardial Perfusion One Day    Interpretation Summary    Left ventricular ejection fraction is normal (Calculated EF = 60%).    High risk for ischemic heart disease.    Myocardial perfusion imaging indicates a large-sized, severe area of ischemia located in the anterior wall, apex and septal wall.    Impressions are consistent with a high risk study.       CARDIAC CATHETERIZATION:  Results for orders placed during the hospital encounter of 12/01/23    Cardiac Catheterization/Vascular Study       OTHER:         Assessment & Plan     Angina with abnormal Myoview  Patient had chest  pain with risk factors for coronary disease and had an abnormal Myoview and hence he underwent cardiac catheterization  Patient had significant left main and three-vessel coronary disease and is scheduled for coronary bypass surgery  Cardiac surgeons have seen the patient is having workup done for pre-CABG.  Patient had an echocardiogram which showed normal LV systolic function  Patient is currently stable on medical therapy with aspirin statins and beta-blockers  Patient had a positive MRSA screen.  Patient had a vein mapping which is adequate on carotid duplex which is normal and his COVID screen is negative.  He also has PFTs which were within normal limits.    Hypertension  Patient is on beta-blockers    Hyperlipidemia  Patient is on Crestor and his lipid levels are followed by the primary care doctor    I discussed the patients findings and my recommendations with patient and nurse    Fred Lopez MD  12/02/23  16:16 EST

## 2023-12-02 NOTE — PROGRESS NOTES
CC:  substernal discomfort/ gas pain     F/U:  MV CAD including left main disease--[place]  EF 60-65% (echo)    Subjective:  no c/o's, denies any c/o CP/ SOA    No events overnight  Plans for CABG tomorrow    PREOP studies:  Carotid duplex:  normal  Vein georges:  adequate  A1c:  pending   Plt ag%  MRSA screen:  POSITIVE  COVID-19 screen:  negative  UA:  negative  PFTs:  FEV1/, FEV1 75, DLCO 82      Intake/Output Summary (Last 24 hours) at 2023 0824  Last data filed at 2023 1602  Gross per 24 hour   Intake 645 ml   Output 700 ml   Net -55 ml     Temp:  [97.6 °F (36.4 °C)-98 °F (36.7 °C)] 98 °F (36.7 °C)  Heart Rate:  [47-68] 54  Resp:  [] 23  BP: ()/(46-97) 119/69      Results from last 7 days   Lab Units 23  0606 23  0617   WBC 10*3/mm3 7.00 8.30   HEMOGLOBIN g/dL 14.9 15.1   HEMATOCRIT % 44.1 45.0   PLATELETS 10*3/mm3 237 275   INR  0.95  --      Results from last 7 days   Lab Units 23  0606   CREATININE mg/dL 0.73*   POTASSIUM mmol/L 4.5   SODIUM mmol/L 138         Physical Exam:  Neuro intact, nad, just showered, mother at bedside  Tele:  SB 50s  CTA, on room air 95%  Benign abd, no BM  No edema    Assessment/Plan:  Principal Problem:    CAD (coronary artery disease)  Active Problems:    Angina at rest    Abnormal nuclear stress test    - MV CAD including left main disease, EF 60% (stress test)--surgical workup in progress  - HTN--stable  - HLD--statin  - Preop sinus bradycardia--HR 50s  - Tobacco abuse--35 pack year hx, PFTs noted, cessation d/w pt  - MRSA nasal colonization--mupirocin/ vanc     Plans for CABG tomorrow morning.  Questions answered.  Tobacco cessation d/w pt and mother.  Echo with normal LV function, no significant VHD  Preop antibiotics ordered--Kefzol/ vancomycin.    Tammy Ny-Tani, PEDRO  2023  08:24 EST

## 2023-12-03 ENCOUNTER — ANESTHESIA (OUTPATIENT)
Dept: PERIOP | Facility: HOSPITAL | Age: 58
End: 2023-12-03
Payer: COMMERCIAL

## 2023-12-03 ENCOUNTER — OFFICE VISIT (OUTPATIENT)
Dept: PERIOP | Facility: HOSPITAL | Age: 58
DRG: 234 | End: 2023-12-03
Payer: COMMERCIAL

## 2023-12-03 ENCOUNTER — APPOINTMENT (OUTPATIENT)
Dept: GENERAL RADIOLOGY | Facility: HOSPITAL | Age: 58
DRG: 234 | End: 2023-12-03
Payer: COMMERCIAL

## 2023-12-03 PROBLEM — Z72.0 TOBACCO ABUSE: Status: ACTIVE | Noted: 2023-12-03

## 2023-12-03 PROBLEM — E78.5 DYSLIPIDEMIA: Status: ACTIVE | Noted: 2023-12-03

## 2023-12-03 LAB
ACT BLD: 141 SECONDS (ref 89–137)
ACT BLD: 157 SECONDS (ref 89–137)
ACT BLD: 374 SECONDS (ref 89–137)
ACT BLD: 417 SECONDS (ref 89–137)
ACT BLD: 542 SECONDS (ref 89–137)
ALBUMIN SERPL-MCNC: 4.1 G/DL (ref 3.5–5.2)
ANION GAP SERPL CALCULATED.3IONS-SCNC: 9 MMOL/L (ref 5–15)
APTT PPP: 28.5 SECONDS (ref 24–31)
ARTERIAL PATENCY WRIST A: ABNORMAL
ATMOSPHERIC PRESS: ABNORMAL MM[HG]
BASE DEFICIT: ABNORMAL
BASE EXCESS BLDA CALC-SCNC: -2.1 MMOL/L (ref 0–3)
BASE EXCESS BLDA CALC-SCNC: -2.4 MMOL/L (ref 0–3)
BASE EXCESS BLDA CALC-SCNC: -2.8 MMOL/L (ref 0–3)
BASE EXCESS BLDA CALC-SCNC: -3 MMOL/L (ref 0–3)
BASE EXCESS BLDA CALC-SCNC: -3.8 MMOL/L (ref 0–3)
BASE EXCESS BLDA CALC-SCNC: -3.9 MMOL/L (ref 0–3)
BASE EXCESS BLDA CALC-SCNC: -4.1 MMOL/L (ref 0–3)
BASE EXCESS BLDA CALC-SCNC: 2 MMOL/L (ref 0–3)
BASE EXCESS BLDA CALC-SCNC: <0 MMOL/L (ref 0–3)
BASE EXCESS BLDV CALC-SCNC: -3.4 MMOL/L (ref -2–2)
BASE EXCESS BLDV CALC-SCNC: ABNORMAL MMOL/L
BASOPHILS # BLD AUTO: 0 10*3/MM3 (ref 0–0.2)
BASOPHILS NFR BLD AUTO: 0.2 % (ref 0–1.5)
BDY SITE: ABNORMAL
BH BB BLOOD EXPIRATION DATE: NORMAL
BH BB BLOOD EXPIRATION DATE: NORMAL
BH BB BLOOD TYPE BARCODE: 5100
BH BB BLOOD TYPE BARCODE: 5100
BH BB DISPENSE STATUS: NORMAL
BH BB DISPENSE STATUS: NORMAL
BH BB PRODUCT CODE: NORMAL
BH BB PRODUCT CODE: NORMAL
BH BB UNIT NUMBER: NORMAL
BH BB UNIT NUMBER: NORMAL
BUN SERPL-MCNC: 10 MG/DL (ref 6–20)
BUN/CREAT SERPL: 13.7 (ref 7–25)
CA-I BLDA-SCNC: 1.1 MMOL/L (ref 1.12–1.32)
CA-I BLDA-SCNC: 1.12 MMOL/L (ref 1.12–1.32)
CA-I BLDA-SCNC: 1.14 MMOL/L (ref 1.12–1.32)
CA-I BLDA-SCNC: 1.18 MMOL/L (ref 1.15–1.33)
CA-I BLDA-SCNC: 1.19 MMOL/L (ref 1.15–1.33)
CA-I BLDA-SCNC: 1.2 MMOL/L (ref 1.15–1.33)
CA-I BLDA-SCNC: 1.21 MMOL/L (ref 1.15–1.33)
CA-I BLDA-SCNC: 1.21 MMOL/L (ref 1.15–1.33)
CA-I BLDA-SCNC: 1.22 MMOL/L (ref 1.15–1.33)
CA-I BLDA-SCNC: 1.28 MMOL/L (ref 1.15–1.33)
CA-I BLDA-SCNC: 1.3 MMOL/L (ref 1.12–1.32)
CA-I BLDA-SCNC: 1.49 MMOL/L (ref 1.12–1.32)
CA-I SERPL ISE-MCNC: 1.28 MMOL/L (ref 1.2–1.3)
CALCIUM SPEC-SCNC: 9.2 MG/DL (ref 8.6–10.5)
CHLORIDE SERPL-SCNC: 103 MMOL/L (ref 98–107)
CO2 BLDA-SCNC: 23.3 MMOL/L (ref 22–29)
CO2 BLDA-SCNC: 24.5 MMOL/L (ref 22–29)
CO2 BLDA-SCNC: 24.8 MMOL/L (ref 22–29)
CO2 BLDA-SCNC: 25.1 MMOL/L (ref 22–29)
CO2 BLDA-SCNC: 25.4 MMOL/L (ref 22–29)
CO2 BLDA-SCNC: 25.9 MMOL/L (ref 22–29)
CO2 BLDA-SCNC: 26 MMOL/L (ref 22–29)
CO2 BLDA-SCNC: 26 MMOL/L (ref 23–27)
CO2 BLDA-SCNC: 27 MMOL/L (ref 23–27)
CO2 BLDA-SCNC: 27.5 MMOL/L (ref 22–29)
CO2 BLDA-SCNC: 28 MMOL/L (ref 23–27)
CO2 BLDA-SCNC: 30 MMOL/L (ref 23–27)
CO2 CONTENT VENOUS: ABNORMAL
CO2 SERPL-SCNC: 26 MMOL/L (ref 22–29)
CREAT SERPL-MCNC: 0.73 MG/DL (ref 0.76–1.27)
CROSSMATCH INTERPRETATION: NORMAL
CROSSMATCH INTERPRETATION: NORMAL
D-LACTATE SERPL-SCNC: 0.7 MMOL/L (ref 0.2–2)
DEPRECATED RDW RBC AUTO: 45.1 FL (ref 37–54)
EGFRCR SERPLBLD CKD-EPI 2021: 106.1 ML/MIN/1.73
EOSINOPHIL # BLD AUTO: 0.2 10*3/MM3 (ref 0–0.4)
EOSINOPHIL NFR BLD AUTO: 0.9 % (ref 0.3–6.2)
ERYTHROCYTE [DISTWIDTH] IN BLOOD BY AUTOMATED COUNT: 12.4 % (ref 12.3–15.4)
GLUCOSE BLDC GLUCOMTR-MCNC: 110 MG/DL (ref 70–105)
GLUCOSE BLDC GLUCOMTR-MCNC: 110 MG/DL (ref 74–100)
GLUCOSE BLDC GLUCOMTR-MCNC: 110 MG/DL (ref 74–100)
GLUCOSE BLDC GLUCOMTR-MCNC: 112 MG/DL (ref 74–100)
GLUCOSE BLDC GLUCOMTR-MCNC: 112 MG/DL (ref 74–100)
GLUCOSE BLDC GLUCOMTR-MCNC: 114 MG/DL (ref 74–100)
GLUCOSE BLDC GLUCOMTR-MCNC: 114 MG/DL (ref 74–100)
GLUCOSE BLDC GLUCOMTR-MCNC: 116 MG/DL (ref 70–105)
GLUCOSE BLDC GLUCOMTR-MCNC: 118 MG/DL (ref 74–100)
GLUCOSE BLDC GLUCOMTR-MCNC: 119 MG/DL (ref 74–100)
GLUCOSE BLDC GLUCOMTR-MCNC: 119 MG/DL (ref 74–100)
GLUCOSE BLDC GLUCOMTR-MCNC: 123 MG/DL (ref 70–105)
GLUCOSE BLDC GLUCOMTR-MCNC: 123 MG/DL (ref 70–105)
GLUCOSE BLDC GLUCOMTR-MCNC: 125 MG/DL (ref 70–105)
GLUCOSE BLDC GLUCOMTR-MCNC: 128 MG/DL (ref 70–105)
GLUCOSE BLDC GLUCOMTR-MCNC: 132 MG/DL (ref 74–100)
GLUCOSE BLDC GLUCOMTR-MCNC: 132 MG/DL (ref 74–100)
GLUCOSE BLDC GLUCOMTR-MCNC: 133 MG/DL (ref 70–105)
GLUCOSE BLDC GLUCOMTR-MCNC: 134 MG/DL (ref 74–100)
GLUCOSE BLDC GLUCOMTR-MCNC: 134 MG/DL (ref 74–100)
GLUCOSE BLDC GLUCOMTR-MCNC: 136 MG/DL (ref 70–105)
GLUCOSE BLDC GLUCOMTR-MCNC: 137 MG/DL (ref 70–105)
GLUCOSE BLDC GLUCOMTR-MCNC: 141 MG/DL (ref 70–105)
GLUCOSE BLDC GLUCOMTR-MCNC: 141 MG/DL (ref 70–105)
GLUCOSE BLDC GLUCOMTR-MCNC: 166 MG/DL (ref 74–100)
GLUCOSE BLDC GLUCOMTR-MCNC: 166 MG/DL (ref 74–100)
GLUCOSE BLDC GLUCOMTR-MCNC: 90 MG/DL (ref 70–105)
GLUCOSE SERPL-MCNC: 123 MG/DL (ref 65–99)
HCO3 BLDA-SCNC: 22 MMOL/L (ref 21–28)
HCO3 BLDA-SCNC: 23 MMOL/L (ref 21–28)
HCO3 BLDA-SCNC: 23.4 MMOL/L (ref 21–28)
HCO3 BLDA-SCNC: 23.6 MMOL/L (ref 21–28)
HCO3 BLDA-SCNC: 23.7 MMOL/L (ref 21–28)
HCO3 BLDA-SCNC: 24.3 MMOL/L (ref 22–26)
HCO3 BLDA-SCNC: 24.5 MMOL/L (ref 21–28)
HCO3 BLDA-SCNC: 25.8 MMOL/L (ref 21–28)
HCO3 BLDA-SCNC: 25.8 MMOL/L (ref 22–26)
HCO3 BLDA-SCNC: 26 MMOL/L (ref 22–26)
HCO3 BLDA-SCNC: 27.9 MMOL/L (ref 22–26)
HCO3 BLDV-SCNC: 24.2 MMOL/L (ref 22–26)
HCO3 BLDV-SCNC: 25.4 MMOL/L (ref 23–28)
HCT VFR BLD AUTO: 40.6 % (ref 37.5–51)
HCT VFR BLDA CALC: 36 % (ref 38–51)
HCT VFR BLDA CALC: 36 % (ref 38–51)
HCT VFR BLDA CALC: 38 % (ref 38–51)
HCT VFR BLDA CALC: 38 % (ref 38–51)
HCT VFR BLDA CALC: 39 % (ref 38–51)
HCT VFR BLDA CALC: 39 % (ref 38–51)
HCT VFR BLDA CALC: 40 % (ref 38–51)
HCT VFR BLDA CALC: 42 % (ref 38–51)
HCT VFR BLDA CALC: 44 % (ref 38–51)
HCT VFR BLDA CALC: 47 % (ref 38–51)
HEMODILUTION: NO
HGB BLD-MCNC: 13.5 G/DL (ref 13–17.7)
HGB BLDA-MCNC: 12.2 G/DL (ref 12–17)
HGB BLDA-MCNC: 12.2 G/DL (ref 12–17)
HGB BLDA-MCNC: 12.9 G/DL (ref 12–17)
HGB BLDA-MCNC: 13.1 G/DL (ref 12–17)
HGB BLDA-MCNC: 13.2 G/DL (ref 12–17)
HGB BLDA-MCNC: 13.3 G/DL (ref 12–17)
HGB BLDA-MCNC: 13.5 G/DL (ref 12–17)
HGB BLDA-MCNC: 13.5 G/DL (ref 12–17)
HGB BLDA-MCNC: 13.6 G/DL (ref 12–17)
HGB BLDA-MCNC: 14.4 G/DL (ref 12–17)
HGB BLDA-MCNC: 15 G/DL (ref 12–17)
HGB BLDA-MCNC: 16 G/DL (ref 12–17)
INHALED O2 CONCENTRATION: 36 %
INHALED O2 CONCENTRATION: 40 %
INHALED O2 CONCENTRATION: 40 %
INHALED O2 CONCENTRATION: 50 %
INHALED O2 CONCENTRATION: 50 %
INHALED O2 CONCENTRATION: 70 %
INR PPP: 1.09 (ref 0.93–1.1)
LYMPHOCYTES # BLD AUTO: 1.8 10*3/MM3 (ref 0.7–3.1)
LYMPHOCYTES NFR BLD AUTO: 10.3 % (ref 19.6–45.3)
MCH RBC QN AUTO: 32.9 PG (ref 26.6–33)
MCHC RBC AUTO-ENTMCNC: 33.3 G/DL (ref 31.5–35.7)
MCV RBC AUTO: 98.9 FL (ref 79–97)
MODALITY: ABNORMAL
MONOCYTES # BLD AUTO: 0.5 10*3/MM3 (ref 0.1–0.9)
MONOCYTES NFR BLD AUTO: 3.1 % (ref 5–12)
NEUTROPHILS NFR BLD AUTO: 15 10*3/MM3 (ref 1.7–7)
NEUTROPHILS NFR BLD AUTO: 85.5 % (ref 42.7–76)
NRBC BLD AUTO-RTO: 0 /100 WBC (ref 0–0.2)
PCO2 BLDA: 41.9 MM HG (ref 35–48)
PCO2 BLDA: 45.3 MM HG (ref 35–48)
PCO2 BLDA: 47.3 MM HG (ref 35–48)
PCO2 BLDA: 49 MM HG (ref 35–48)
PCO2 BLDA: 49.4 MM HG (ref 35–48)
PCO2 BLDA: 52.1 MM HG (ref 35–45)
PCO2 BLDA: 52.6 MM HG (ref 35–48)
PCO2 BLDA: 54.3 MM HG (ref 35–45)
PCO2 BLDA: 54.6 MM HG (ref 35–45)
PCO2 BLDA: 55.5 MM HG (ref 35–45)
PCO2 BLDA: 56.1 MM HG (ref 35–48)
PCO2 BLDV: 53.5 MM HG (ref 42–51)
PCO2 BLDV: 61.1 MM HG (ref 41–51)
PEEP RESPIRATORY: 5 CM[H2O]
PEEP RESPIRATORY: 8 CM[H2O]
PH BLDA: 7.25 PH UNITS (ref 7.35–7.45)
PH BLDA: 7.26 PH UNITS (ref 7.35–7.45)
PH BLDA: 7.27 PH UNITS (ref 7.35–7.45)
PH BLDA: 7.28 PH UNITS (ref 7.35–7.45)
PH BLDA: 7.29 PH UNITS (ref 7.35–7.45)
PH BLDA: 7.3 PH UNITS (ref 7.35–7.45)
PH BLDA: 7.3 PH UNITS (ref 7.35–7.45)
PH BLDA: 7.31 PH UNITS (ref 7.35–7.45)
PH BLDA: 7.32 PH UNITS (ref 7.35–7.45)
PH BLDA: 7.32 PH UNITS (ref 7.35–7.45)
PH BLDA: 7.33 PH UNITS (ref 7.35–7.45)
PH BLDV: 7.23 PH UNITS (ref 7.31–7.41)
PH BLDV: 7.26 PH UNITS (ref 7.32–7.43)
PHOSPHATE SERPL-MCNC: 3.8 MG/DL (ref 2.5–4.5)
PLATELET # BLD AUTO: 196 10*3/MM3 (ref 140–450)
PMV BLD AUTO: 9 FL (ref 6–12)
PO2 BLDA: 219 MM HG (ref 80–105)
PO2 BLDA: 286 MM HG (ref 80–105)
PO2 BLDA: 397 MM HG (ref 80–105)
PO2 BLDA: 442 MM HG (ref 80–105)
PO2 BLDA: 68.2 MM HG (ref 83–108)
PO2 BLDA: 76.4 MM HG (ref 83–108)
PO2 BLDA: 78.4 MM HG (ref 83–108)
PO2 BLDA: 80.2 MM HG (ref 83–108)
PO2 BLDA: 82.1 MM HG (ref 83–108)
PO2 BLDA: 82.5 MM HG (ref 83–108)
PO2 BLDA: 97.9 MM HG (ref 83–108)
PO2 BLDV: 40.3 MM HG (ref 40–42)
PO2 BLDV: 58 MM HG (ref 35–42)
POTASSIUM BLDA-SCNC: 4 MMOL/L (ref 3.5–4.5)
POTASSIUM BLDA-SCNC: 4 MMOL/L (ref 3.5–4.9)
POTASSIUM BLDA-SCNC: 4.2 MMOL/L (ref 3.5–4.5)
POTASSIUM BLDA-SCNC: 4.2 MMOL/L (ref 3.5–4.9)
POTASSIUM BLDA-SCNC: 4.3 MMOL/L (ref 3.5–4.5)
POTASSIUM BLDA-SCNC: 4.4 MMOL/L (ref 3.5–4.5)
POTASSIUM BLDA-SCNC: 4.5 MMOL/L (ref 3.5–4.5)
POTASSIUM BLDA-SCNC: 4.6 MMOL/L (ref 3.5–4.9)
POTASSIUM BLDA-SCNC: 5.1 MMOL/L (ref 3.5–4.9)
POTASSIUM BLDA-SCNC: 5.2 MMOL/L (ref 3.5–4.9)
POTASSIUM SERPL-SCNC: 4.9 MMOL/L (ref 3.5–5.2)
PROTHROMBIN TIME: 11.8 SECONDS (ref 9.6–11.7)
QT INTERVAL: 390 MS
QTC INTERVAL: 458 MS
RBC # BLD AUTO: 4.1 10*6/MM3 (ref 4.14–5.8)
RESPIRATORY RATE: 14
RESPIRATORY RATE: 18
RESPIRATORY RATE: 18
RESPIRATORY RATE: 20
SAO2 % BLDCOA: 100 % (ref 95–98)
SAO2 % BLDCOA: 91.3 % (ref 94–98)
SAO2 % BLDCOA: 93.1 % (ref 94–98)
SAO2 % BLDCOA: 93.6 % (ref 94–98)
SAO2 % BLDCOA: 94 % (ref 94–98)
SAO2 % BLDCOA: 94.3 % (ref 94–98)
SAO2 % BLDCOA: 95.2 % (ref 94–98)
SAO2 % BLDCOA: 96.8 % (ref 94–98)
SAO2 % BLDCOV: 27 % (ref 45–75)
SAO2 % BLDCOV: 66.8 % (ref 45–75)
SODIUM BLD-SCNC: 134 MMOL/L (ref 138–146)
SODIUM BLD-SCNC: 134 MMOL/L (ref 138–146)
SODIUM BLD-SCNC: 135 MMOL/L (ref 138–146)
SODIUM BLD-SCNC: 136 MMOL/L (ref 138–146)
SODIUM BLD-SCNC: 138 MMOL/L (ref 138–146)
SODIUM BLD-SCNC: 140 MMOL/L (ref 138–146)
SODIUM BLD-SCNC: 141 MMOL/L (ref 138–146)
SODIUM BLD-SCNC: 142 MMOL/L (ref 138–146)
SODIUM SERPL-SCNC: 138 MMOL/L (ref 136–145)
UNIT  ABO: NORMAL
UNIT  ABO: NORMAL
UNIT  RH: NORMAL
UNIT  RH: NORMAL
VENTILATOR MODE: ABNORMAL
VENTILATOR MODE: ABNORMAL
VENTILATOR MODE: AC
VT ON VENT VENT: 520 ML
VT ON VENT VENT: 550 ML
VT ON VENT VENT: 550 ML
WBC NRBC COR # BLD AUTO: 17.5 10*3/MM3 (ref 3.4–10.8)

## 2023-12-03 PROCEDURE — 25010000002 PROTAMINE SULFATE PER 10 MG: Performed by: ANESTHESIOLOGY

## 2023-12-03 PROCEDURE — 25010000002 MIDAZOLAM PER 1 MG: Performed by: ANESTHESIOLOGY

## 2023-12-03 PROCEDURE — 25010000002 ACETAMINOPHEN 10 MG/ML SOLUTION: Performed by: NURSE PRACTITIONER

## 2023-12-03 PROCEDURE — 94799 UNLISTED PULMONARY SVC/PX: CPT

## 2023-12-03 PROCEDURE — 85347 COAGULATION TIME ACTIVATED: CPT

## 2023-12-03 PROCEDURE — 94002 VENT MGMT INPAT INIT DAY: CPT

## 2023-12-03 PROCEDURE — 25010000002 MAGNESIUM SULFATE IN D5W 1G/100ML (PREMIX) 1-5 GM/100ML-% SOLUTION: Performed by: NURSE PRACTITIONER

## 2023-12-03 PROCEDURE — 33518 CABG ARTERY-VEIN TWO: CPT | Performed by: SPECIALIST/TECHNOLOGIST, OTHER

## 2023-12-03 PROCEDURE — 33518 CABG ARTERY-VEIN TWO: CPT

## 2023-12-03 PROCEDURE — 93005 ELECTROCARDIOGRAM TRACING: CPT | Performed by: NURSE PRACTITIONER

## 2023-12-03 PROCEDURE — 80069 RENAL FUNCTION PANEL: CPT | Performed by: NURSE PRACTITIONER

## 2023-12-03 PROCEDURE — 25810000003 SODIUM CHLORIDE 0.9 % SOLUTION: Performed by: NURSE PRACTITIONER

## 2023-12-03 PROCEDURE — 25010000002 FENTANYL CITRATE (PF) 250 MCG/5ML SOLUTION: Performed by: ANESTHESIOLOGY

## 2023-12-03 PROCEDURE — 25010000002 HEPARIN (PORCINE) PER 1000 UNITS

## 2023-12-03 PROCEDURE — 82330 ASSAY OF CALCIUM: CPT | Performed by: NURSE PRACTITIONER

## 2023-12-03 PROCEDURE — 25010000002 CEFAZOLIN PER 500 MG: Performed by: ANESTHESIOLOGY

## 2023-12-03 PROCEDURE — 021209W BYPASS CORONARY ARTERY, THREE ARTERIES FROM AORTA WITH AUTOLOGOUS VENOUS TISSUE, OPEN APPROACH: ICD-10-PCS

## 2023-12-03 PROCEDURE — P9041 ALBUMIN (HUMAN),5%, 50ML: HCPCS | Performed by: ANESTHESIOLOGY

## 2023-12-03 PROCEDURE — 82330 ASSAY OF CALCIUM: CPT

## 2023-12-03 PROCEDURE — 84132 ASSAY OF SERUM POTASSIUM: CPT

## 2023-12-03 PROCEDURE — 94003 VENT MGMT INPAT SUBQ DAY: CPT

## 2023-12-03 PROCEDURE — 82803 BLOOD GASES ANY COMBINATION: CPT

## 2023-12-03 PROCEDURE — 25010000002 ALBUMIN HUMAN 5% PER 50 ML: Performed by: NURSE PRACTITIONER

## 2023-12-03 PROCEDURE — 71045 X-RAY EXAM CHEST 1 VIEW: CPT

## 2023-12-03 PROCEDURE — 5A1221Z PERFORMANCE OF CARDIAC OUTPUT, CONTINUOUS: ICD-10-PCS

## 2023-12-03 PROCEDURE — 25010000002 MORPHINE PER 10 MG: Performed by: NURSE PRACTITIONER

## 2023-12-03 PROCEDURE — 85025 COMPLETE CBC W/AUTO DIFF WBC: CPT | Performed by: NURSE PRACTITIONER

## 2023-12-03 PROCEDURE — A4648 IMPLANTABLE TISSUE MARKER: HCPCS

## 2023-12-03 PROCEDURE — 25010000002 CEFAZOLIN PER 500 MG: Performed by: NURSE PRACTITIONER

## 2023-12-03 PROCEDURE — 25810000003 SODIUM CHLORIDE 0.9 % SOLUTION 250 ML FLEX CONT: Performed by: NURSE PRACTITIONER

## 2023-12-03 PROCEDURE — 25010000002 VANCOMYCIN HCL 1.25 G RECONSTITUTED SOLUTION 1 EACH VIAL: Performed by: NURSE PRACTITIONER

## 2023-12-03 PROCEDURE — 85014 HEMATOCRIT: CPT

## 2023-12-03 PROCEDURE — 02100Z9 BYPASS CORONARY ARTERY, ONE ARTERY FROM LEFT INTERNAL MAMMARY, OPEN APPROACH: ICD-10-PCS

## 2023-12-03 PROCEDURE — 25010000002 MAGNESIUM SULFATE 2 GM/50ML SOLUTION: Performed by: ANESTHESIOLOGY

## 2023-12-03 PROCEDURE — C1713 ANCHOR/SCREW BN/BN,TIS/BN: HCPCS

## 2023-12-03 PROCEDURE — 80051 ELECTROLYTE PANEL: CPT

## 2023-12-03 PROCEDURE — 85730 THROMBOPLASTIN TIME PARTIAL: CPT | Performed by: NURSE PRACTITIONER

## 2023-12-03 PROCEDURE — C1751 CATH, INF, PER/CENT/MIDLINE: HCPCS | Performed by: ANESTHESIOLOGY

## 2023-12-03 PROCEDURE — 82947 ASSAY GLUCOSE BLOOD QUANT: CPT

## 2023-12-03 PROCEDURE — 83605 ASSAY OF LACTIC ACID: CPT

## 2023-12-03 PROCEDURE — 25010000002 ACETAMINOPHEN 10 MG/ML SOLUTION: Performed by: ANESTHESIOLOGY

## 2023-12-03 PROCEDURE — 85018 HEMOGLOBIN: CPT

## 2023-12-03 PROCEDURE — 82948 REAGENT STRIP/BLOOD GLUCOSE: CPT

## 2023-12-03 PROCEDURE — C1751 CATH, INF, PER/CENT/MIDLINE: HCPCS

## 2023-12-03 PROCEDURE — 25010000002 ONDANSETRON PER 1 MG: Performed by: ANESTHESIOLOGY

## 2023-12-03 PROCEDURE — P9041 ALBUMIN (HUMAN),5%, 50ML: HCPCS | Performed by: NURSE PRACTITIONER

## 2023-12-03 PROCEDURE — 33508 ENDOSCOPIC VEIN HARVEST: CPT | Performed by: SPECIALIST/TECHNOLOGIST, OTHER

## 2023-12-03 PROCEDURE — 93318 ECHO TRANSESOPHAGEAL INTRAOP: CPT | Performed by: ANESTHESIOLOGY

## 2023-12-03 PROCEDURE — 25010000002 ALBUMIN HUMAN 5% PER 50 ML: Performed by: ANESTHESIOLOGY

## 2023-12-03 PROCEDURE — 33508 ENDOSCOPIC VEIN HARVEST: CPT

## 2023-12-03 PROCEDURE — 99233 SBSQ HOSP IP/OBS HIGH 50: CPT | Performed by: INTERNAL MEDICINE

## 2023-12-03 PROCEDURE — 33533 CABG ARTERIAL SINGLE: CPT

## 2023-12-03 PROCEDURE — 25010000002 HEPARIN (PORCINE) PER 1000 UNITS: Performed by: ANESTHESIOLOGY

## 2023-12-03 PROCEDURE — 33533 CABG ARTERIAL SINGLE: CPT | Performed by: SPECIALIST/TECHNOLOGIST, OTHER

## 2023-12-03 PROCEDURE — 06BP4ZZ EXCISION OF RIGHT SAPHENOUS VEIN, PERCUTANEOUS ENDOSCOPIC APPROACH: ICD-10-PCS

## 2023-12-03 PROCEDURE — C1887 CATHETER, GUIDING: HCPCS

## 2023-12-03 PROCEDURE — 85610 PROTHROMBIN TIME: CPT | Performed by: NURSE PRACTITIONER

## 2023-12-03 PROCEDURE — 84295 ASSAY OF SERUM SODIUM: CPT

## 2023-12-03 PROCEDURE — 25010000002 CALCIUM GLUCONATE PER 10 ML: Performed by: ANESTHESIOLOGY

## 2023-12-03 PROCEDURE — 94761 N-INVAS EAR/PLS OXIMETRY MLT: CPT

## 2023-12-03 PROCEDURE — 25810000003 SODIUM CHLORIDE 0.9 % SOLUTION: Performed by: ANESTHESIOLOGY

## 2023-12-03 PROCEDURE — 25010000002 VANCOMYCIN 10 G RECONSTITUTED SOLUTION: Performed by: NURSE PRACTITIONER

## 2023-12-03 PROCEDURE — 25010000002 PAPAVERINE PER 60 MG

## 2023-12-03 PROCEDURE — 25010000002 ESMOLOL 100 MG/10ML SOLUTION: Performed by: ANESTHESIOLOGY

## 2023-12-03 PROCEDURE — C1729 CATH, DRAINAGE: HCPCS

## 2023-12-03 DEVICE — SS SUTURE, 6 PER SLEEVE
Type: IMPLANTABLE DEVICE | Site: STERNUM | Status: FUNCTIONAL
Brand: MYO/WIRE II

## 2023-12-03 DEVICE — WAX,BONE,NATURAL
Type: IMPLANTABLE DEVICE | Site: STERNUM | Status: FUNCTIONAL
Brand: MEDLINE INDUSTRIES

## 2023-12-03 DEVICE — ABSORBABLE HEMOSTAT (OXIDIZED REGENERATED CELLULOSE, U.S.P.)
Type: IMPLANTABLE DEVICE | Site: CHEST | Status: FUNCTIONAL
Brand: SURGICEL

## 2023-12-03 DEVICE — TEMP PACING WIRE
Type: IMPLANTABLE DEVICE | Site: HEART | Status: FUNCTIONAL
Brand: MYO/WIRE

## 2023-12-03 DEVICE — SS SUTURE, 3 PER SLEEVE
Type: IMPLANTABLE DEVICE | Site: STERNUM | Status: FUNCTIONAL
Brand: MYO/WIRE II

## 2023-12-03 DEVICE — DEV CONTRL TISS STRATAFIXSPIRALMNCRYL PLSPS2 REV3/0 15CM: Type: IMPLANTABLE DEVICE | Site: LEG | Status: FUNCTIONAL

## 2023-12-03 DEVICE — DEV CONTRL TISS STRATAFIX SPIRAL MNCRYL UD 3/0 PLS 30CM: Type: IMPLANTABLE DEVICE | Site: CHEST | Status: FUNCTIONAL

## 2023-12-03 RX ORDER — MAGNESIUM SULFATE HEPTAHYDRATE 40 MG/ML
INJECTION, SOLUTION INTRAVENOUS AS NEEDED
Status: DISCONTINUED | OUTPATIENT
Start: 2023-12-03 | End: 2023-12-03 | Stop reason: SURG

## 2023-12-03 RX ORDER — ALBUMIN, HUMAN INJ 5% 5 %
SOLUTION INTRAVENOUS CONTINUOUS PRN
Status: DISCONTINUED | OUTPATIENT
Start: 2023-12-03 | End: 2023-12-03 | Stop reason: SURG

## 2023-12-03 RX ORDER — AMINOCAPROIC ACID 250 MG/ML
INJECTION, SOLUTION INTRAVENOUS AS NEEDED
Status: DISCONTINUED | OUTPATIENT
Start: 2023-12-03 | End: 2023-12-03 | Stop reason: SURG

## 2023-12-03 RX ORDER — NALOXONE HCL 0.4 MG/ML
0.4 VIAL (ML) INJECTION
Status: DISCONTINUED | OUTPATIENT
Start: 2023-12-03 | End: 2023-12-08 | Stop reason: HOSPADM

## 2023-12-03 RX ORDER — NITROGLYCERIN 20 MG/100ML
5-50 INJECTION INTRAVENOUS CONTINUOUS PRN
Status: DISCONTINUED | OUTPATIENT
Start: 2023-12-03 | End: 2023-12-04

## 2023-12-03 RX ORDER — ASPIRIN 325 MG
325 TABLET ORAL ONCE
Qty: 1 TABLET | Refills: 0 | Status: COMPLETED | OUTPATIENT
Start: 2023-12-03 | End: 2023-12-03

## 2023-12-03 RX ORDER — VECURONIUM BROMIDE 1 MG/ML
INJECTION, POWDER, LYOPHILIZED, FOR SOLUTION INTRAVENOUS AS NEEDED
Status: DISCONTINUED | OUTPATIENT
Start: 2023-12-03 | End: 2023-12-03 | Stop reason: SURG

## 2023-12-03 RX ORDER — ACETAMINOPHEN 650 MG/1
650 SUPPOSITORY RECTAL EVERY 4 HOURS PRN
Status: DISCONTINUED | OUTPATIENT
Start: 2023-12-04 | End: 2023-12-08 | Stop reason: HOSPADM

## 2023-12-03 RX ORDER — HYDROCODONE BITARTRATE AND ACETAMINOPHEN 5; 325 MG/1; MG/1
1 TABLET ORAL EVERY 4 HOURS PRN
Status: DISCONTINUED | OUTPATIENT
Start: 2023-12-03 | End: 2023-12-08 | Stop reason: HOSPADM

## 2023-12-03 RX ORDER — ACETAMINOPHEN 160 MG/5ML
650 SOLUTION ORAL EVERY 4 HOURS PRN
Status: DISCONTINUED | OUTPATIENT
Start: 2023-12-04 | End: 2023-12-08 | Stop reason: HOSPADM

## 2023-12-03 RX ORDER — NITROGLYCERIN 0.4 MG/1
0.4 TABLET SUBLINGUAL
Status: DISCONTINUED | OUTPATIENT
Start: 2023-12-03 | End: 2023-12-08 | Stop reason: HOSPADM

## 2023-12-03 RX ORDER — ONDANSETRON 2 MG/ML
4 INJECTION INTRAMUSCULAR; INTRAVENOUS EVERY 6 HOURS PRN
Status: DISCONTINUED | OUTPATIENT
Start: 2023-12-03 | End: 2023-12-08 | Stop reason: HOSPADM

## 2023-12-03 RX ORDER — ACETAMINOPHEN 325 MG/1
650 TABLET ORAL EVERY 4 HOURS PRN
Status: DISCONTINUED | OUTPATIENT
Start: 2023-12-04 | End: 2023-12-08 | Stop reason: HOSPADM

## 2023-12-03 RX ORDER — ESMOLOL HYDROCHLORIDE 10 MG/ML
INJECTION INTRAVENOUS AS NEEDED
Status: DISCONTINUED | OUTPATIENT
Start: 2023-12-03 | End: 2023-12-03 | Stop reason: SURG

## 2023-12-03 RX ORDER — SODIUM CHLORIDE 9 MG/ML
INJECTION, SOLUTION INTRAVENOUS CONTINUOUS PRN
Status: DISCONTINUED | OUTPATIENT
Start: 2023-12-03 | End: 2023-12-03 | Stop reason: SURG

## 2023-12-03 RX ORDER — MORPHINE SULFATE 2 MG/ML
2 INJECTION, SOLUTION INTRAMUSCULAR; INTRAVENOUS
Status: DISPENSED | OUTPATIENT
Start: 2023-12-03 | End: 2023-12-06

## 2023-12-03 RX ORDER — ACETAMINOPHEN 10 MG/ML
1000 INJECTION, SOLUTION INTRAVENOUS EVERY 8 HOURS
Qty: 200 ML | Refills: 0 | Status: COMPLETED | OUTPATIENT
Start: 2023-12-03 | End: 2023-12-04

## 2023-12-03 RX ORDER — AMOXICILLIN 250 MG
2 CAPSULE ORAL 2 TIMES DAILY
Status: DISCONTINUED | OUTPATIENT
Start: 2023-12-03 | End: 2023-12-08 | Stop reason: HOSPADM

## 2023-12-03 RX ORDER — CALCIUM GLUCONATE 94 MG/ML
INJECTION, SOLUTION INTRAVENOUS AS NEEDED
Status: DISCONTINUED | OUTPATIENT
Start: 2023-12-03 | End: 2023-12-03 | Stop reason: SURG

## 2023-12-03 RX ORDER — HEPARIN SODIUM 1000 [USP'U]/ML
INJECTION, SOLUTION INTRAVENOUS; SUBCUTANEOUS AS NEEDED
Status: DISCONTINUED | OUTPATIENT
Start: 2023-12-03 | End: 2023-12-03 | Stop reason: SURG

## 2023-12-03 RX ORDER — MEPERIDINE HYDROCHLORIDE 25 MG/ML
25 INJECTION INTRAMUSCULAR; INTRAVENOUS; SUBCUTANEOUS ONCE AS NEEDED
Status: DISCONTINUED | OUTPATIENT
Start: 2023-12-03 | End: 2023-12-04

## 2023-12-03 RX ORDER — CHLORHEXIDINE GLUCONATE ORAL RINSE 1.2 MG/ML
15 SOLUTION DENTAL EVERY 12 HOURS
Status: DISCONTINUED | OUTPATIENT
Start: 2023-12-03 | End: 2023-12-08 | Stop reason: HOSPADM

## 2023-12-03 RX ORDER — ALBUMIN, HUMAN INJ 5% 5 %
12.5 SOLUTION INTRAVENOUS AS NEEDED
Status: ACTIVE | OUTPATIENT
Start: 2023-12-03 | End: 2023-12-04

## 2023-12-03 RX ORDER — ONDANSETRON 2 MG/ML
INJECTION INTRAMUSCULAR; INTRAVENOUS AS NEEDED
Status: DISCONTINUED | OUTPATIENT
Start: 2023-12-03 | End: 2023-12-03 | Stop reason: SURG

## 2023-12-03 RX ORDER — POLYETHYLENE GLYCOL 3350 17 G/17G
17 POWDER, FOR SOLUTION ORAL 2 TIMES DAILY
Status: DISCONTINUED | OUTPATIENT
Start: 2023-12-03 | End: 2023-12-08 | Stop reason: HOSPADM

## 2023-12-03 RX ORDER — DEXTROSE MONOHYDRATE 25 G/50ML
10-50 INJECTION, SOLUTION INTRAVENOUS
Status: DISCONTINUED | OUTPATIENT
Start: 2023-12-03 | End: 2023-12-04

## 2023-12-03 RX ORDER — DEXMEDETOMIDINE HYDROCHLORIDE 4 UG/ML
.2-1.5 INJECTION, SOLUTION INTRAVENOUS
Status: DISCONTINUED | OUTPATIENT
Start: 2023-12-03 | End: 2023-12-04

## 2023-12-03 RX ORDER — NICOTINE POLACRILEX 4 MG
15 LOZENGE BUCCAL
Status: DISCONTINUED | OUTPATIENT
Start: 2023-12-03 | End: 2023-12-04

## 2023-12-03 RX ORDER — MIDAZOLAM HYDROCHLORIDE 1 MG/ML
INJECTION INTRAMUSCULAR; INTRAVENOUS AS NEEDED
Status: DISCONTINUED | OUTPATIENT
Start: 2023-12-03 | End: 2023-12-03 | Stop reason: SURG

## 2023-12-03 RX ORDER — ETOMIDATE 2 MG/ML
INJECTION INTRAVENOUS AS NEEDED
Status: DISCONTINUED | OUTPATIENT
Start: 2023-12-03 | End: 2023-12-03 | Stop reason: SURG

## 2023-12-03 RX ORDER — ENOXAPARIN SODIUM 100 MG/ML
40 INJECTION SUBCUTANEOUS DAILY
Status: DISCONTINUED | OUTPATIENT
Start: 2023-12-04 | End: 2023-12-08 | Stop reason: HOSPADM

## 2023-12-03 RX ORDER — MAGNESIUM HYDROXIDE 1200 MG/15ML
LIQUID ORAL AS NEEDED
Status: DISCONTINUED | OUTPATIENT
Start: 2023-12-03 | End: 2023-12-03 | Stop reason: HOSPADM

## 2023-12-03 RX ORDER — KETAMINE HCL IN NACL, ISO-OSM 100MG/10ML
SYRINGE (ML) INJECTION AS NEEDED
Status: DISCONTINUED | OUTPATIENT
Start: 2023-12-03 | End: 2023-12-03 | Stop reason: SURG

## 2023-12-03 RX ORDER — GLYCOPYRROLATE 0.2 MG/ML
INJECTION INTRAMUSCULAR; INTRAVENOUS AS NEEDED
Status: DISCONTINUED | OUTPATIENT
Start: 2023-12-03 | End: 2023-12-03 | Stop reason: SURG

## 2023-12-03 RX ORDER — SODIUM CHLORIDE 9 MG/ML
30 INJECTION, SOLUTION INTRAVENOUS CONTINUOUS
Status: DISCONTINUED | OUTPATIENT
Start: 2023-12-03 | End: 2023-12-04

## 2023-12-03 RX ORDER — ATORVASTATIN CALCIUM 40 MG/1
40 TABLET, FILM COATED ORAL NIGHTLY
Status: DISCONTINUED | OUTPATIENT
Start: 2023-12-04 | End: 2023-12-08 | Stop reason: HOSPADM

## 2023-12-03 RX ORDER — NOREPINEPHRINE BITARTRATE 0.03 MG/ML
.02-.06 INJECTION, SOLUTION INTRAVENOUS CONTINUOUS PRN
Status: DISCONTINUED | OUTPATIENT
Start: 2023-12-03 | End: 2023-12-04

## 2023-12-03 RX ORDER — ACETAMINOPHEN 10 MG/ML
INJECTION, SOLUTION INTRAVENOUS AS NEEDED
Status: DISCONTINUED | OUTPATIENT
Start: 2023-12-03 | End: 2023-12-03 | Stop reason: SURG

## 2023-12-03 RX ORDER — PANTOPRAZOLE SODIUM 40 MG/1
40 TABLET, DELAYED RELEASE ORAL DAILY
Status: DISCONTINUED | OUTPATIENT
Start: 2023-12-04 | End: 2023-12-08 | Stop reason: HOSPADM

## 2023-12-03 RX ORDER — PANTOPRAZOLE SODIUM 40 MG/10ML
40 INJECTION, POWDER, LYOPHILIZED, FOR SOLUTION INTRAVENOUS ONCE
Qty: 10 ML | Refills: 0 | Status: COMPLETED | OUTPATIENT
Start: 2023-12-03 | End: 2023-12-03

## 2023-12-03 RX ORDER — EPHEDRINE SULFATE 5 MG/ML
INJECTION INTRAVENOUS AS NEEDED
Status: DISCONTINUED | OUTPATIENT
Start: 2023-12-03 | End: 2023-12-03 | Stop reason: SURG

## 2023-12-03 RX ORDER — NICARDIPINE HYDROCHLORIDE 2.5 MG/ML
INJECTION INTRAVENOUS CONTINUOUS PRN
Status: DISCONTINUED | OUTPATIENT
Start: 2023-12-03 | End: 2023-12-03 | Stop reason: SURG

## 2023-12-03 RX ORDER — MAGNESIUM SULFATE 1 G/100ML
1 INJECTION INTRAVENOUS EVERY 8 HOURS
Qty: 300 ML | Refills: 0 | Status: COMPLETED | OUTPATIENT
Start: 2023-12-03 | End: 2023-12-04

## 2023-12-03 RX ORDER — FENTANYL CITRATE 50 UG/ML
INJECTION, SOLUTION INTRAMUSCULAR; INTRAVENOUS AS NEEDED
Status: DISCONTINUED | OUTPATIENT
Start: 2023-12-03 | End: 2023-12-03 | Stop reason: SURG

## 2023-12-03 RX ORDER — ASPIRIN 81 MG/1
81 TABLET ORAL DAILY
Status: DISCONTINUED | OUTPATIENT
Start: 2023-12-04 | End: 2023-12-08 | Stop reason: HOSPADM

## 2023-12-03 RX ORDER — IBUPROFEN 600 MG/1
1 TABLET ORAL
Status: DISCONTINUED | OUTPATIENT
Start: 2023-12-03 | End: 2023-12-04

## 2023-12-03 RX ADMIN — NICARDIPINE HYDROCHLORIDE 1 MG/HR: 25 INJECTION, SOLUTION INTRAVENOUS at 09:32

## 2023-12-03 RX ADMIN — POLYETHYLENE GLYCOL 3350 17 G: 17 POWDER, FOR SOLUTION ORAL at 20:04

## 2023-12-03 RX ADMIN — ACETAMINOPHEN 1000 MG: 1000 INJECTION INTRAVENOUS at 16:58

## 2023-12-03 RX ADMIN — VECURONIUM BROMIDE 5 MG: 10 INJECTION, POWDER, LYOPHILIZED, FOR SOLUTION INTRAVENOUS at 08:28

## 2023-12-03 RX ADMIN — HYDROCODONE BITARTRATE AND ACETAMINOPHEN 1 TABLET: 5; 325 TABLET ORAL at 16:58

## 2023-12-03 RX ADMIN — MAGNESIUM SULFATE HEPTAHYDRATE 2 G: 2 INJECTION, SOLUTION INTRAVENOUS at 09:07

## 2023-12-03 RX ADMIN — Medication 20 MG: at 06:55

## 2023-12-03 RX ADMIN — MORPHINE SULFATE 2 MG: 2 INJECTION, SOLUTION INTRAMUSCULAR; INTRAVENOUS at 13:46

## 2023-12-03 RX ADMIN — AMINOCAPROIC ACID 10 G: 250 INJECTION, SOLUTION INTRAVENOUS at 07:16

## 2023-12-03 RX ADMIN — DOCUSATE SODIUM 50 MG AND SENNOSIDES 8.6 MG 2 TABLET: 8.6; 5 TABLET, FILM COATED ORAL at 20:04

## 2023-12-03 RX ADMIN — MORPHINE SULFATE 2 MG: 2 INJECTION, SOLUTION INTRAMUSCULAR; INTRAVENOUS at 22:10

## 2023-12-03 RX ADMIN — VANCOMYCIN HYDROCHLORIDE 1250 MG: 1.25 INJECTION, POWDER, LYOPHILIZED, FOR SOLUTION INTRAVENOUS at 19:09

## 2023-12-03 RX ADMIN — VECURONIUM BROMIDE 5 MG: 10 INJECTION, POWDER, LYOPHILIZED, FOR SOLUTION INTRAVENOUS at 07:45

## 2023-12-03 RX ADMIN — ALBUMIN (HUMAN) 12.5 G: 12.5 INJECTION, SOLUTION INTRAVENOUS at 11:08

## 2023-12-03 RX ADMIN — Medication 50 MG: at 06:51

## 2023-12-03 RX ADMIN — FENTANYL CITRATE 250 MCG: 0.05 INJECTION, SOLUTION INTRAMUSCULAR; INTRAVENOUS at 08:29

## 2023-12-03 RX ADMIN — PROTAMINE SULFATE 300 MG: 10 INJECTION, SOLUTION INTRAVENOUS at 09:13

## 2023-12-03 RX ADMIN — ASPIRIN 325 MG: 325 TABLET ORAL at 14:55

## 2023-12-03 RX ADMIN — MIDAZOLAM 2 MG: 1 INJECTION INTRAMUSCULAR; INTRAVENOUS at 09:28

## 2023-12-03 RX ADMIN — GLYCOPYRROLATE 0.2 MG: 0.2 INJECTION INTRAMUSCULAR; INTRAVENOUS at 07:05

## 2023-12-03 RX ADMIN — SODIUM CHLORIDE 2 G: 900 INJECTION INTRAVENOUS at 14:55

## 2023-12-03 RX ADMIN — PANTOPRAZOLE SODIUM 40 MG: 40 INJECTION, POWDER, FOR SOLUTION INTRAVENOUS at 14:55

## 2023-12-03 RX ADMIN — ESMOLOL HYDROCHLORIDE 10 MG: 100 INJECTION, SOLUTION INTRAVENOUS at 07:39

## 2023-12-03 RX ADMIN — MORPHINE SULFATE 2 MG: 2 INJECTION, SOLUTION INTRAMUSCULAR; INTRAVENOUS at 20:03

## 2023-12-03 RX ADMIN — MUPIROCIN: 20 OINTMENT TOPICAL at 21:09

## 2023-12-03 RX ADMIN — ALBUMIN HUMAN: 0.05 INJECTION, SOLUTION INTRAVENOUS at 09:14

## 2023-12-03 RX ADMIN — VECURONIUM BROMIDE 5 MG: 10 INJECTION, POWDER, LYOPHILIZED, FOR SOLUTION INTRAVENOUS at 09:14

## 2023-12-03 RX ADMIN — 0.12% CHLORHEXIDINE GLUCONATE 15 ML: 1.2 RINSE ORAL at 05:43

## 2023-12-03 RX ADMIN — FENTANYL CITRATE 250 MCG: 0.05 INJECTION, SOLUTION INTRAMUSCULAR; INTRAVENOUS at 09:13

## 2023-12-03 RX ADMIN — MORPHINE SULFATE 2 MG: 2 INJECTION, SOLUTION INTRAMUSCULAR; INTRAVENOUS at 17:53

## 2023-12-03 RX ADMIN — CEFAZOLIN 2 G: 2 INJECTION, POWDER, LYOPHILIZED, FOR SOLUTION INTRAVENOUS at 07:16

## 2023-12-03 RX ADMIN — ACETAMINOPHEN 1000 MG: 10 INJECTION, SOLUTION INTRAVENOUS at 09:38

## 2023-12-03 RX ADMIN — ALBUMIN HUMAN: 0.05 INJECTION, SOLUTION INTRAVENOUS at 09:31

## 2023-12-03 RX ADMIN — SODIUM CHLORIDE: 9 INJECTION, SOLUTION INTRAVENOUS at 06:47

## 2023-12-03 RX ADMIN — MAGNESIUM SULFATE IN DEXTROSE 1 G: 10 INJECTION, SOLUTION INTRAVENOUS at 16:11

## 2023-12-03 RX ADMIN — FENTANYL CITRATE 500 MCG: 0.05 INJECTION, SOLUTION INTRAMUSCULAR; INTRAVENOUS at 06:59

## 2023-12-03 RX ADMIN — AMINOCAPROIC ACID 10 G: 250 INJECTION, SOLUTION INTRAVENOUS at 09:28

## 2023-12-03 RX ADMIN — CALCIUM GLUCONATE 1 G: 98 INJECTION, SOLUTION INTRAVENOUS at 09:14

## 2023-12-03 RX ADMIN — MORPHINE SULFATE 2 MG: 2 INJECTION, SOLUTION INTRAMUSCULAR; INTRAVENOUS at 16:04

## 2023-12-03 RX ADMIN — MIDAZOLAM 5 MG: 1 INJECTION INTRAMUSCULAR; INTRAVENOUS at 08:28

## 2023-12-03 RX ADMIN — Medication 1.5 G: at 06:50

## 2023-12-03 RX ADMIN — EPHEDRINE SULFATE 5 MG: 5 INJECTION INTRAVENOUS at 07:19

## 2023-12-03 RX ADMIN — ONDANSETRON 4 MG: 2 INJECTION INTRAMUSCULAR; INTRAVENOUS at 09:38

## 2023-12-03 RX ADMIN — 0.12% CHLORHEXIDINE GLUCONATE 15 ML: 1.2 RINSE ORAL at 20:04

## 2023-12-03 RX ADMIN — ALBUMIN (HUMAN) 12.5 G: 12.5 INJECTION, SOLUTION INTRAVENOUS at 13:19

## 2023-12-03 RX ADMIN — Medication 30 MG: at 06:53

## 2023-12-03 RX ADMIN — MORPHINE SULFATE 2 MG: 2 INJECTION, SOLUTION INTRAMUSCULAR; INTRAVENOUS at 11:54

## 2023-12-03 RX ADMIN — VECURONIUM BROMIDE 10 MG: 10 INJECTION, POWDER, LYOPHILIZED, FOR SOLUTION INTRAVENOUS at 06:59

## 2023-12-03 RX ADMIN — FENTANYL CITRATE 250 MCG: 0.05 INJECTION, SOLUTION INTRAMUSCULAR; INTRAVENOUS at 09:02

## 2023-12-03 RX ADMIN — MIDAZOLAM 5 MG: 1 INJECTION INTRAMUSCULAR; INTRAVENOUS at 06:51

## 2023-12-03 RX ADMIN — CHLORHEXIDINE GLUCONATE 1 APPLICATION: 500 CLOTH TOPICAL at 05:40

## 2023-12-03 RX ADMIN — HYDROCODONE BITARTRATE AND ACETAMINOPHEN 1 TABLET: 5; 325 TABLET ORAL at 13:15

## 2023-12-03 RX ADMIN — SODIUM CHLORIDE 30 ML/HR: 9 INJECTION, SOLUTION INTRAVENOUS at 11:24

## 2023-12-03 RX ADMIN — SODIUM CHLORIDE 2 G: 900 INJECTION INTRAVENOUS at 23:40

## 2023-12-03 RX ADMIN — CEFAZOLIN 2 G: 2 INJECTION, POWDER, LYOPHILIZED, FOR SOLUTION INTRAVENOUS at 09:24

## 2023-12-03 RX ADMIN — HYDROCODONE BITARTRATE AND ACETAMINOPHEN 1 TABLET: 5; 325 TABLET ORAL at 21:08

## 2023-12-03 RX ADMIN — ALBUMIN (HUMAN) 12.5 G: 12.5 INJECTION, SOLUTION INTRAVENOUS at 11:53

## 2023-12-03 RX ADMIN — ETOMIDATE 20 MG: 2 INJECTION INTRAVENOUS at 06:59

## 2023-12-03 RX ADMIN — FENTANYL CITRATE 250 MCG: 0.05 INJECTION, SOLUTION INTRAMUSCULAR; INTRAVENOUS at 07:24

## 2023-12-03 RX ADMIN — INSULIN HUMAN 2 UNITS/HR: 1 INJECTION, SOLUTION INTRAVENOUS at 08:27

## 2023-12-03 RX ADMIN — HEPARIN SODIUM 27000 UNITS: 1000 INJECTION INTRAVENOUS; SUBCUTANEOUS at 08:07

## 2023-12-03 RX ADMIN — MIDAZOLAM 3 MG: 1 INJECTION INTRAMUSCULAR; INTRAVENOUS at 09:02

## 2023-12-03 NOTE — ANESTHESIA PROCEDURE NOTES
Intra-Op Anesthesia BIGG    Procedure Performed: Intra-Op Anesthesia BIGG       Start Time:  12/3/2023 7:20 AM       End Time:   12/3/2023 7:30 AM    Preanesthesia Checklist:  Patient identified, IV assessed, risks and benefits discussed, monitors and equipment assessed, procedure being performed at surgeon's request and anesthesia consent obtained.    General Procedure Information  BIGG Placed for monitoring purposes only -- This is not a diagnostic BIGG  Diagnostic Indications for Echo:  assessment of ascending aorta, assessment of surgical repair, defect repair evaluation and hemodynamic monitoring  Location performed:  OR  Intubated  Bite block placed  Heart visualized  Probe Insertion:  Easy  Probe Type:  Multiplane  Modalities:  Color flow mapping, continuous wave Doppler and pulse wave Doppler    Echocardiographic and Doppler Measurements    Ventricles    Right Ventricle:  Cavity size normal.  Hypertrophy not present.  Thrombus not present.  Global function normal.    Left Ventricle:  Cavity size normal.  Thrombus not present.  Global Function normal.      Ventricular Regional Function:  11- Mid Inferior:  hypokinetic      Valves    Aortic Valve:  Annulus normal.  Stenosis not present.  Regurgitation absent.  Leaflets normal.  Leaflet motions normal.      Mitral Valve:  Annulus normal.  Stenosis not present.  Regurgitation trace.  Leaflets normal.  Leaflet motions normal.      Tricuspid Valve:  Annulus normal.  Stenosis not present.  Regurgitation trace.  Leaflets normal.  Leaflet motions normal.    Pulmonic Valve:  Annulus normal.  Stenosis not present.  Regurgitation absent.        Aorta    Ascending Aorta:  Size normal.  Diameter 2.8 cm.  Dissection not present.  Plaque thickness less than 3 mm.  Mobile plaque not present.    Aortic Arch:  Size normal.  Diameter 2.3 cm.  Dissection not present.  Plaque thickness less than 3 mm.  Mobile plaque not present.    Descending Aorta:  Size normal.  Diameter 2.2 cm.   Dissection not present.  Plaque thickness less than 3 mm.  Mobile plaque not present.        Atria    Right Atrium:  Size normal.  Spontaneous echo contrast not present.  Thrombus not present.  Tumor not present.  Device not present.      Left Atrium:  Size normal.  Spontaneous echo contrast not present.  Thrombus not present.  Tumor not present.  Device not present.    Left atrial appendage normal.      Septa        Ventricular Septum:  Intra-ventricular septum morphology normal.          Other Findings  Pericardium:  normal  Pleural Effusion:  none  Pulmonary Arteries:  normal  Pulmonary Venous Flow:  normal    Anesthesia Information  Performed Personally  Anesthesiologist:  Lio Ramachandran MD

## 2023-12-03 NOTE — ANESTHESIA PROCEDURE NOTES
Airway  Urgency: elective    Date/Time: 12/3/2023 7:01 AM  End Time:12/3/2023 7:01 AM  Airway not difficult    General Information and Staff    Patient location during procedure: OR  Anesthesiologist: Lio Ramachandran MD    Indications and Patient Condition  Indications for airway management: airway protection    Preoxygenated: yes  MILS maintained throughout  Mask difficulty assessment: 1 - vent by mask    Final Airway Details  Final airway type: endotracheal airway      Successful airway: ETT  Cuffed: yes   Successful intubation technique: direct laryngoscopy  Endotracheal tube insertion site: oral  Blade: Ira  Blade size: 4  ETT size (mm): 7.5  Cormack-Lehane Classification: grade IIa - partial view of glottis  Placement verified by: chest auscultation and capnometry   Measured from: teeth  ETT/EBT  to teeth (cm): 24  Number of attempts at approach: 1  Assessment: lips, teeth, and gum same as pre-op and atraumatic intubation    Additional Comments  X1 UDVC. ATRAUMATIC.  TEETH IN PREOP CONDITION.  CUFF TO MINIMUM OCCLUSIVE CUFF PRESSURE. POS ETCO2. BS=BS. ENDO BITE BLOCK

## 2023-12-03 NOTE — ANESTHESIA PROCEDURE NOTES
Arterial Line      Patient location during procedure: OR  Start time: 12/3/2023 6:51 AM  Stop Time:12/3/2023 6:57 AM       Line placed for ABGs/Labs/ISTAT, hemodynamic monitoring and MD/Surgeon request.  Performed By   Anesthesiologist: Lio Ramachandran MD   Preanesthetic Checklist  Completed: patient identified, IV checked, site marked, risks and benefits discussed, surgical consent, monitors and equipment checked, pre-op evaluation and timeout performed  Arterial Line Prep    Sterile Tech: cap, gloves, sterile barriers and mask  Prep: ChloraPrep  Patient monitoring: EKG, continuous pulse oximetry and blood pressure monitoring  Arterial Line Procedure   Laterality:left  Location:  radial artery  Catheter size: 20 G   Guidance: ultrasound guided  PROCEDURE NOTE/ULTRASOUND INTERPRETATION.  Using ultrasound guidance the potential vascular sites for insertion of the catheter were visualized to determine the patency of the vessel to be used for vascular access.  After selecting the appropriate site for insertion, the needle was visualized under ultrasound being inserted into the radial artery, followed by ultrasound confirmation of wire and catheter placement. There were no abnormalities seen on ultrasound; an image was taken; and the patient tolerated the procedure with no complications.   Number of attempts: 1 Images: still images obtained, printed/placed on the chart  Post Assessment   Dressing Type: wrist guard applied, secured with tape and occlusive dressing applied.   Complications no  Circ/Move/Sens Assessment: normal and unchanged.   Patient Tolerance: patient tolerated the procedure well with no apparent complications  Additional Notes  L RADIAL ART LINE VIA ASEPTIC SELDINGER TECH X1 US GUIDED RNMD ASSIST ROLA YU

## 2023-12-03 NOTE — ANESTHESIA POSTPROCEDURE EVALUATION
Patient: John Paul Tineo    Procedure Summary       Date: 12/03/23 Room / Location: [facility] CVOR 01 / [facility] CVOR    Anesthesia Start: 0647 Anesthesia Stop: 1024    Procedure: CORONARY ARTERY BYPASS GRAFTING (Chest) Diagnosis:       Coronary artery disease of native artery of native heart with stable angina pectoris      (Coronary artery disease of native artery of native heart with stable angina pectoris [I25.118])    Surgeons: Jeffery Salmon MD Provider: Lio Ramachandran MD    Anesthesia Type: general, Blessing, CVL, PAC ASA Status: 4            Anesthesia Type: general, Blessing, CVL, PAC    Vitals  Vitals Value Taken Time   BP     Temp 96.26 °F (35.7 °C) 12/03/23 1026   Pulse 82 12/03/23 1026   Resp     SpO2 94 % 12/03/23 1026   Vitals shown include unfiled device data.        Post Anesthesia Care and Evaluation    Patient location during evaluation: ICU  Patient participation: complete - patient cannot participate  Level of consciousness: obtunded/minimal responses  Pain scale: See nurse's notes for pain score.  Pain management: adequate    Airway patency: patent  Anesthetic complications: No anesthetic complications  PONV Status: none  Cardiovascular status: acceptable  Respiratory status: acceptable, ventilator and ETT  Hydration status: acceptable    Comments: Patient seen and examined postoperatively; vital signs stable; SpO2 greater than or equal to 90%; cardiopulmonary status stable; nausea/vomiting adequately controlled; pain adequately controlled; no apparent anesthesia complications; patient discharged from anesthesia care when discharge criteria were met. Sedated on vent. Hemodynamically stable. Cardene 3mg/hr

## 2023-12-03 NOTE — ANESTHESIA PROCEDURE NOTES
Central Line      Patient location during procedure: OR  Start time: 12/3/2023 7:13 AM  Stop Time:12/3/2023 7:15 AM  Indications: central pressure monitoring, vascular access and MD/Surgeon request  Staff  Anesthesiologist: Lio Ramachandran MD  Preanesthetic Checklist  Completed: patient identified, IV checked, site marked, risks and benefits discussed, surgical consent, monitors and equipment checked, pre-op evaluation and timeout performed  Central Line Prep  Sterile Tech:cap, gloves, gown, mask and sterile barriers  Prep: chloraprep  Patient monitoring: blood pressure monitoring, continuous pulse oximetry and EKG  Central Line Procedure  Laterality:right  Location:internal jugular  Catheter Type:[place]  Assessment  Post procedure:biopatch applied, occlusive dressing applied and secured with tape  Complications:no  Patient Tolerance:patient tolerated the procedure well with no apparent complications  Additional Notes  THRU PREVIOUSLY PLACED MAC INTRODUCER. NEW GLOVES/DRAPE. TO PA X1 50CM NO WEDGE ATTEMPTED

## 2023-12-03 NOTE — PROGRESS NOTES
CARDIOLOGY PROGRESS NOTE:    John Paul Tineo  58 y.o.  male  1965  [id number]      Referring Provider: Cardiac surgeon    Reason for follow-up: Coronary artery disease     Patient Care Team:  Morris Mercado PA-C as PCP - General (Physician Assistant)  Fred Lopez MD as Consulting Physician (Cardiology)    Subjective .  Patient is intubated    Objective intubated     Review of Systems   Unable to perform ROS: Intubated       Allergies: Patient has no known allergies.    Scheduled Meds:acetaminophen, 1,000 mg, Intravenous, Q8H  [START ON 12/4/2023] aspirin, 81 mg, Oral, Daily  [START ON 12/4/2023] atorvastatin, 40 mg, Oral, Nightly  ceFAZolin, 2 g, Intravenous, Q8H  chlorhexidine, 15 mL, Mouth/Throat, Q12H  [START ON 12/4/2023] enoxaparin, 40 mg, Subcutaneous, Daily  magnesium sulfate, 1 g, Intravenous, Q8H  mupirocin, , Each Nare, BID  [START ON 12/4/2023] pantoprazole, 40 mg, Oral, Daily  polyethylene glycol, 17 g, Oral, BID  senna-docusate sodium, 2 tablet, Oral, BID  vancomycin, 15 mg/kg, Intravenous, Q12H      Continuous Infusions:dexmedetomidine, 0.2-1.5 mcg/kg/hr, Last Rate: Stopped (12/03/23 1431)  insulin, 0-100 Units/hr, Last Rate: Stopped (12/03/23 1123)  niCARdipine, 5-15 mg/hr, Last Rate: Stopped (12/03/23 1030)  nitroglycerin, 5-50 mcg/min  norepinephrine, 0.02-0.06 mcg/kg/min, Last Rate: 0.04 mcg/kg/min (12/03/23 1632)  Pharmacy to dose vancomycin,   sodium chloride, 30 mL/hr, Last Rate: 30 mL/hr (12/03/23 1124)      PRN Meds:.  [START ON 12/4/2023] acetaminophen **OR** [START ON 12/4/2023] acetaminophen **OR** [START ON 12/4/2023] acetaminophen    albumin human    Calcium Replacement - Follow Nurse / BPA Driven Protocol    dextrose    dextrose    glucagon (human recombinant)    HYDROcodone-acetaminophen    Magnesium Cardiology Dose Replacement - Follow Nurse / BPA Driven Protocol    meperidine    Morphine **AND** naloxone    niCARdipine    nitroglycerin    nitroglycerin    norepinephrine    " ondansetron    Pharmacy to dose vancomycin    Phosphorus Replacement - Follow Nurse / BPA Driven Protocol    Potassium Replacement - Follow Nurse / BPA Driven Protocol    vasopressin        VITAL SIGNS  Vitals:    12/03/23 1523 12/03/23 1530 12/03/23 1600 12/03/23 1629   BP:   108/67    BP Location:       Patient Position:       Pulse: 90 94 90 89   Resp: 22 22 28    Temp:  98.1 °F (36.7 °C) 98.6 °F (37 °C) 98.1 °F (36.7 °C)   TempSrc:       SpO2: 95% 95% 94% 94%   Weight:       Height:           Flowsheet Rows      Flowsheet Row First Filed Value   Admission Height 170.2 cm (67\") Documented at 12/01/2023 0715   Admission Weight 90.9 kg (200 lb 6.4 oz) Documented at 12/01/2023 0715             TELEMETRY: Sinus rhythm    Physical Exam:  Constitutional:       Appearance: Well-developed.   Eyes:      General: No scleral icterus.     Conjunctiva/sclera: Conjunctivae normal.      Pupils: Pupils are equal, round, and reactive to light.   HENT:      Head: Normocephalic and atraumatic.   Neck:      Vascular: No carotid bruit or JVD.   Pulmonary:      Effort: Pulmonary effort is normal.      Breath sounds: Normal breath sounds. No wheezing. No rales.   Cardiovascular:      Normal rate. Regular rhythm.   Pulses:     Intact distal pulses.   Abdominal:      General: Bowel sounds are normal.      Palpations: Abdomen is soft.   Musculoskeletal: Normal range of motion.      Cervical back: Normal range of motion and neck supple. Skin:     General: Skin is warm and dry.      Findings: No rash.   Neurological:      Mental Status: Alert.      Comments: No focal deficits          Results Review:   I reviewed the patient's new clinical results.  Lab Results (last 24 hours)       Procedure Component Value Units Date/Time    Blood Gas, Arterial - [[id number]]  (Abnormal) Collected: 12/03/23 1521    Specimen: Arterial Blood Updated: 12/03/23 1533     Site Arterial Line     Valentino's Test N/A     pH, Arterial 7.279 pH units      pCO2, " Arterial 49.0 mm Hg      pO2, Arterial 76.4 mm Hg      HCO3, Arterial 23.0 mmol/L      Base Excess, Arterial -4.1 mmol/L      Comment: Serial Number: [id number]:  [id number]        O2 Saturation, Arterial 93.1 %      CO2 Content 24.5 mmol/L      Barometric Pressure for Blood Gas --     Comment: N/A        Modality Adult Vent     FIO2 40 %      Ventilator Mode CPAP/PS     PEEP 5     Hemodilution No    POCT Electrolytes +HGB +HCT [[id number]]  (Abnormal) Collected: 12/03/23 1521    Specimen: Arterial Blood Updated: 12/03/23 1533     Sodium 141 mmol/L      POC Potassium 4.3 mmol/L      Ionized Calcium 1.19 mmol/L      Comment: Serial Number: [id number]:  [id number]        Glucose 134 mg/dL      Hematocrit 38 %      Hemoglobin 13.1 g/dL     POC Glucose Once [[id number]]  (Abnormal) Collected: 12/03/23 1521    Specimen: Arterial Blood Updated: 12/03/23 1533     Glucose 134 mg/dL      Comment: Serial Number: [id number]:  [id number]       Blood Gas, Arterial - [[id number]]  (Abnormal) Collected: 12/03/23 1356    Specimen: Arterial Blood Updated: 12/03/23 1402     Site Arterial Line     Valentino's Test N/A     pH, Arterial 7.262 pH units      pCO2, Arterial 52.6 mm Hg      pO2, Arterial 80.2 mm Hg      HCO3, Arterial 23.7 mmol/L      Base Excess, Arterial -3.9 mmol/L      Comment: Serial Number: [id number]:  [id number]        O2 Saturation, Arterial 93.6 %      CO2 Content 25.4 mmol/L      Barometric Pressure for Blood Gas --     Comment: N/A        Modality Adult Vent     FIO2 40 %      Ventilator Mode CPAP/PS     PEEP 5     Hemodilution No    POCT Electrolytes +HGB +HCT [[id number]]  (Abnormal) Collected: 12/03/23 1356    Specimen: Arterial Blood Updated: 12/03/23 1402     Sodium 141 mmol/L      POC Potassium 4.3 mmol/L      Ionized Calcium 1.21 mmol/L      Comment: Serial Number: [id number]:  [id number]        Glucose 132 mg/dL      Hematocrit 40 %      Hemoglobin 13.5 g/dL     POC Glucose Once [[id number]]  (Abnormal) Collected:  12/03/23 1356    Specimen: Arterial Blood Updated: 12/03/23 1402     Glucose 132 mg/dL      Comment: Serial Number: [id number]:  [id number]       Blood Gas, Arterial - [[id number]]  (Abnormal) Collected: 12/03/23 1314    Specimen: Arterial Blood Updated: 12/03/23 1318     Site Arterial Line     Valentino's Test N/A     pH, Arterial 7.329 pH units      pCO2, Arterial 41.9 mm Hg      pO2, Arterial 82.5 mm Hg      HCO3, Arterial 22.0 mmol/L      Base Excess, Arterial -3.8 mmol/L      Comment: Serial Number: [id number]:  [id number]        O2 Saturation, Arterial 95.2 %      CO2 Content 23.3 mmol/L      Barometric Pressure for Blood Gas --     Comment: N/A        Modality Adult Vent     FIO2 50 %      Ventilator Mode AC     Set Tidal Volume 550     PEEP 5     Hemodilution No     Respiratory Rate 20    POCT Electrolytes +HGB +HCT [[id number]]  (Abnormal) Collected: 12/03/23 1314    Specimen: Arterial Blood Updated: 12/03/23 1318     Sodium 140 mmol/L      POC Potassium 4.3 mmol/L      Ionized Calcium 1.18 mmol/L      Comment: Serial Number: [id number]:  [id number]        Glucose 118 mg/dL      Hematocrit 39 %      Hemoglobin 13.2 g/dL     POC Glucose Once [[id number]]  (Abnormal) Collected: 12/03/23 1314    Specimen: Arterial Blood Updated: 12/03/23 1318     Glucose 118 mg/dL      Comment: Serial Number: [id number]:  [id number]       POCT Electrolytes +HGB +HCT [[id number]]  (Abnormal) Collected: 12/03/23 1307    Specimen: Arterial Blood Updated: 12/03/23 1312     Sodium 141 mmol/L      POC Potassium 4.3 mmol/L      Ionized Calcium 1.20 mmol/L      Comment: Serial Number: [id number]:  [id number]        Glucose 119 mg/dL      Hematocrit 39 %      Hemoglobin 13.3 g/dL     POC Glucose Once [[id number]]  (Abnormal) Collected: 12/03/23 1307    Specimen: Arterial Blood Updated: 12/03/23 1312     Glucose 119 mg/dL      Comment: Serial Number: [id number]:  [id number]       Blood Gas, Arterial - [[id number]] Collected: 12/03/23 1307    Specimen:  Arterial Blood Updated: 12/03/23 1312    Blood Gas, Arterial - [[id number]]  (Abnormal) Collected: 12/03/23 1224    Specimen: Arterial Blood Updated: 12/03/23 1231     Site Arterial Line     Valentino's Test N/A     pH, Arterial 7.322 pH units      pCO2, Arterial 45.3 mm Hg      pO2, Arterial 78.4 mm Hg      HCO3, Arterial 23.4 mmol/L      Base Excess, Arterial -2.8 mmol/L      Comment: Serial Number: [id number]:  [id number]        O2 Saturation, Arterial 94.3 %      CO2 Content 24.8 mmol/L      Barometric Pressure for Blood Gas --     Comment: N/A        Modality Adult Vent     FIO2 50 %      Ventilator Mode AC     Set Tidal Volume 550     PEEP 8     Hemodilution No     Respiratory Rate 18    POCT Electrolytes +HGB +HCT [[id number]]  (Abnormal) Collected: 12/03/23 1224    Specimen: Arterial Blood Updated: 12/03/23 1231     Sodium 141 mmol/L      POC Potassium 4.3 mmol/L      Ionized Calcium 1.22 mmol/L      Comment: Serial Number: [id number]:  [id number]        Glucose 118 mg/dL      Hematocrit 40 %      Hemoglobin 13.6 g/dL     POC Glucose Once [[id number]]  (Abnormal) Collected: 12/03/23 1224    Specimen: Arterial Blood Updated: 12/03/23 1231     Glucose 118 mg/dL      Comment: Serial Number: [id number]:  [id number]       Blood Gas, Arterial - [[id number]]  (Abnormal) Collected: 12/03/23 1135    Specimen: Arterial Blood Updated: 12/03/23 1141     Site Arterial Line     Valentino's Test N/A     pH, Arterial 7.303 pH units      pCO2, Arterial 49.4 mm Hg      pO2, Arterial 97.9 mm Hg      HCO3, Arterial 24.5 mmol/L      Base Excess, Arterial -2.4 mmol/L      Comment: Serial Number: [id number]:  [id number]        O2 Saturation, Arterial 96.8 %      CO2 Content 26.0 mmol/L      Barometric Pressure for Blood Gas --     Comment: N/A        Modality Adult Vent     FIO2 70 %      Ventilator Mode AC     Set Tidal Volume 520     PEEP 8     Hemodilution No     Respiratory Rate 18    POCT Electrolytes +HGB +HCT [[id number]]  (Abnormal)  Collected: 12/03/23 1135    Specimen: Arterial Blood Updated: 12/03/23 1141     Sodium 140 mmol/L      POC Potassium 4.5 mmol/L      Ionized Calcium 1.22 mmol/L      Comment: Serial Number: [id number]:  [id number]        Glucose 112 mg/dL      Hematocrit 40 %      Hemoglobin 13.6 g/dL     POC Glucose Once [[id number]]  (Abnormal) Collected: 12/03/23 1135    Specimen: Arterial Blood Updated: 12/03/23 1141     Glucose 112 mg/dL      Comment: Serial Number: [id number]:  [id number]       Blood Gas, Venous - [[id number]]  (Abnormal) Collected: 12/03/23 1104    Specimen: Venous Blood Updated: 12/03/23 1108     Site CentralLine     pH, Venous 7.264 pH Units      pCO2, Venous 53.5 mm Hg      pO2, Venous 40.3 mm Hg      HCO3, Venous 24.2 mmol/L      Base Excess, Venous -3.4 mmol/L      Comment: Serial Number: [id number]:  [id number]        O2 Saturation, Venous 66.8 %      CO2 Content 25.9 mmol/L      Barometric Pressure for Blood Gas --     Comment: N/A        Modality Adult Vent     FIO2 70 %      Ventilator Mode AC     Set Tidal Volume 520     PEEP 8    POCT Electrolytes +HGB +HCT [[id number]]  (Abnormal) Collected: 12/03/23 1104    Specimen: Venous Blood Updated: 12/03/23 1108     Sodium 142 mmol/L      POC Potassium 4.0 mmol/L      Ionized Calcium 1.21 mmol/L      Comment: Serial Number: [id number]:  [id number]        Glucose 110 mg/dL      Hematocrit 40 %      Hemoglobin 13.6 g/dL     POC Glucose Once [[id number]]  (Abnormal) Collected: 12/03/23 1104    Specimen: Venous Blood Updated: 12/03/23 1108     Glucose 110 mg/dL      Comment: Serial Number: [id number]:  [id number]       Blood Gas, Arterial - [[id number]]  (Abnormal) Collected: 12/03/23 1053    Specimen: Arterial Blood Updated: 12/03/23 1057     Site Arterial Line     Valentino's Test N/A     pH, Arterial 7.270 pH units      pCO2, Arterial 56.1 mm Hg      pO2, Arterial 82.1 mm Hg      HCO3, Arterial 25.8 mmol/L      Base Excess, Arterial -2.1 mmol/L      Comment: Serial  Number: [id number]:  [id number]        O2 Saturation, Arterial 94.0 %      CO2 Content 27.5 mmol/L      Barometric Pressure for Blood Gas --     Comment: N/A        Modality Adult Vent     FIO2 70 %      Ventilator Mode AC     Set Tidal Volume 520     PEEP 8     Hemodilution No     Respiratory Rate 14    POCT Electrolytes +HGB +HCT [[id number]]  (Abnormal) Collected: 12/03/23 1053    Specimen: Arterial Blood Updated: 12/03/23 1057     Sodium 140 mmol/L      POC Potassium 4.4 mmol/L      Ionized Calcium 1.28 mmol/L      Comment: Serial Number: [id number]:  [id number]        Glucose 114 mg/dL      Hematocrit 42 %      Hemoglobin 14.4 g/dL     POC Lactate [[id number]]  (Normal) Collected: 12/03/23 1053    Specimen: Arterial Blood Updated: 12/03/23 1057     Lactate 0.7 mmol/L      Comment: Serial Number: [id number]:  [id number]       POC Glucose Once [[id number]]  (Abnormal) Collected: 12/03/23 1053    Specimen: Arterial Blood Updated: 12/03/23 1057     Glucose 114 mg/dL      Comment: Serial Number: [id number]:  [id number]       Renal Function Panel [[id number]]  (Abnormal) Collected: 12/03/23 1023    Specimen: Blood Updated: 12/03/23 1055     Glucose 123 mg/dL      BUN 10 mg/dL      Creatinine 0.73 mg/dL      Sodium 138 mmol/L      Potassium 4.9 mmol/L      Comment: Slight hemolysis detected by analyzer. Result may be falsely elevated.        Chloride 103 mmol/L      CO2 26.0 mmol/L      Calcium 9.2 mg/dL      Albumin 4.1 g/dL      Phosphorus 3.8 mg/dL      Anion Gap 9.0 mmol/L      BUN/Creatinine Ratio 13.7     eGFR 106.1 mL/min/1.73     Narrative:      GFR Normal >60  Chronic Kidney Disease <60  Kidney Failure <15      Protime-INR [[id number]]  (Abnormal) Collected: 12/03/23 1023    Specimen: Blood Updated: 12/03/23 1049     Protime 11.8 Seconds      INR 1.09    aPTT [[id number]]  (Normal) Collected: 12/03/23 1023    Specimen: Blood Updated: 12/03/23 1049     PTT 28.5 seconds     Calcium, Ionized [[id number]]  (Normal)  Collected: 12/03/23 1023    Specimen: Blood Updated: 12/03/23 1044     Ionized Calcium 1.28 mmol/L     CBC & Differential [[id number]]  (Abnormal) Collected: 12/03/23 1023    Specimen: Blood Updated: 12/03/23 1040    Narrative:      The following orders were created for panel order CBC & Differential.  Procedure                               Abnormality         Status                     ---------                               -----------         ------                     CBC Auto Differential[[id number]]        Abnormal            Final result                 Please view results for these tests on the individual orders.    CBC Auto Differential [[id number]]  (Abnormal) Collected: 12/03/23 1023    Specimen: Blood Updated: 12/03/23 1040     WBC 17.50 10*3/mm3      RBC 4.10 10*6/mm3      Hemoglobin 13.5 g/dL      Hematocrit 40.6 %      MCV 98.9 fL      MCH 32.9 pg      MCHC 33.3 g/dL      RDW 12.4 %      RDW-SD 45.1 fl      MPV 9.0 fL      Platelets 196 10*3/mm3      Neutrophil % 85.5 %      Lymphocyte % 10.3 %      Monocyte % 3.1 %      Eosinophil % 0.9 %      Basophil % 0.2 %      Neutrophils, Absolute 15.00 10*3/mm3      Lymphocytes, Absolute 1.80 10*3/mm3      Monocytes, Absolute 0.50 10*3/mm3      Eosinophils, Absolute 0.20 10*3/mm3      Basophils, Absolute 0.00 10*3/mm3      nRBC 0.0 /100 WBC     POC Chem 8, arterial (ISTAT) [[id number]]  (Abnormal) Collected: 12/03/23 0929    Specimen: Arterial Blood Updated: 12/03/23 1000     Ionized Calcium 1.49 mmol/L      pH, Arterial 7.290 pH units      pCO2, Arterial 54.6 mm Hg      pO2, Arterial 219.0 mm Hg      HCO3, Arterial 26.0 mmol/L      Base Excess, Arterial <0.0 mmol/L      Base Deficit --     O2 Saturation, Arterial 100.0 %      Glucose 133 mg/dL      Comment: Serial Number: [id number]:  [id number]        Sodium 135 mmol/L      POC Potassium 4.6 mmol/L      Hematocrit 36 %      Hemoglobin 12.2 g/dL      CO2 Content 28 mmol/L     POC Chem 8, arterial (ISTAT)  [[id number]]  (Abnormal) Collected: 12/03/23 0737    Specimen: Arterial Blood Updated: 12/03/23 1000     Ionized Calcium 1.30 mmol/L      pH, Arterial 7.300 pH units      pCO2, Arterial 52.1 mm Hg      pO2, Arterial 286.0 mm Hg      HCO3, Arterial 25.8 mmol/L      Base Excess, Arterial <0.0 mmol/L      Base Deficit --     O2 Saturation, Arterial 100.0 %      Glucose 116 mg/dL      Comment: Serial Number: [id number]:  [id number]        Sodium 138 mmol/L      POC Potassium 4.0 mmol/L      Hematocrit 44 %      Hemoglobin 15.0 g/dL      CO2 Content 27 mmol/L     POC Chem 8, arterial (ISTAT) [[id number]]  (Abnormal) Collected: 12/03/23 0905    Specimen: Arterial Blood Updated: 12/03/23 0959     Ionized Calcium 1.10 mmol/L      pH, Arterial 7.320 pH units      pCO2, Arterial 54.3 mm Hg      pO2, Arterial 397.0 mm Hg      HCO3, Arterial 27.9 mmol/L      Base Excess, Arterial 2.0 mmol/L      Base Deficit --     O2 Saturation, Arterial 100.0 %      Glucose 141 mg/dL      Comment: Serial Number: [id number]:  [id number]        Sodium 136 mmol/L      POC Potassium 5.2 mmol/L      Hematocrit 38 %      Hemoglobin 12.9 g/dL      CO2 Content 30 mmol/L     POC Chem Panel [[id number]]  (Abnormal) Collected: 12/03/23 0848    Specimen: Venous Blood Updated: 12/03/23 0959     Glucose 128 mg/dL      Comment: Serial Number: [id number]:  [id number]        Sodium 134 mmol/L      POC Potassium 5.1 mmol/L      Ionized Calcium 1.14 mmol/L      Hematocrit 36 %      Hemoglobin 12.2 g/dL      pH, Venous 7.230 pH Units      pCO2, Venous 61.1 mm Hg      CO2 CONTENT  --     HCO3, Venous 25.4 mmol/L      Base Excess, Venous --     Base Deficit --     O2 Saturation, Venous 27.0 %      pO2, Venous 58.0 mm Hg     POC Chem 8, arterial (ISTAT) [[id number]]  (Abnormal) Collected: 12/03/23 0841    Specimen: Arterial Blood Updated: 12/03/23 0959     Ionized Calcium 1.12 mmol/L      pH, Arterial 7.250 pH units      pCO2, Arterial 55.5 mm Hg      pO2, Arterial 442.0  mm Hg      HCO3, Arterial 24.3 mmol/L      Base Excess, Arterial <0.0 mmol/L      Base Deficit --     O2 Saturation, Arterial 100.0 %      Glucose 123 mg/dL      Comment: Serial Number: [id number]:  [id number]        Sodium 134 mmol/L      POC Potassium 4.2 mmol/L      Hematocrit 47 %      Hemoglobin 16.0 g/dL      CO2 Content 26 mmol/L     POC Activated Clotting Time [[id number]]  (Abnormal) Collected: 12/03/23 0813    Specimen: Arterial Blood Updated: 12/03/23 0959     Activated Clotting Time  542 Seconds      Comment: Serial Number: [id number]:  [id number]       POC Activated Clotting Time [[id number]]  (Abnormal) Collected: 12/03/23 0928    Specimen: Arterial Blood Updated: 12/03/23 0936     Activated Clotting Time  141 Seconds      Comment: Serial Number: [id number]:  [id number]       POC Activated Clotting Time [[id number]]  (Abnormal) Collected: 12/03/23 0905    Specimen: Arterial Blood Updated: 12/03/23 0935     Activated Clotting Time  374 Seconds      Comment: Serial Number: [id number]:  [id number]       POC Activated Clotting Time [[id number]]  (Abnormal) Collected: 12/03/23 0842    Specimen: Arterial Blood Updated: 12/03/23 0935     Activated Clotting Time  417 Seconds      Comment: Serial Number: [id number]:  [id number]       POC Activated Clotting Time [[id number]]  (Abnormal) Collected: 12/03/23 0737    Specimen: Arterial Blood Updated: 12/03/23 0935     Activated Clotting Time  157 Seconds      Comment: Serial Number: [id number]:  [id number]       POC Glucose Once [[id number]]  (Normal) Collected: 12/03/23 0600    Specimen: Blood Updated: 12/03/23 0602     Glucose 90 mg/dL      Comment: Serial Number: [id number]:  [id number]               Imaging Results (Last 24 Hours)       Procedure Component Value Units Date/Time    XR Chest 1 View [[id number]] Collected: 12/03/23 1210     Updated: 12/03/23 1213    Narrative:      XR CHEST 1 VW    Date of Exam: 12/3/2023 10:47 AM EST    Indication: Post-Op Check Line &  Tube Placement    Comparison: 12/1/2023    Findings:  ET tube is above the tiffany. Status post sternotomy. Esophagogastric tube tip overlies the body of the stomach. Right IJ Jol[place]-Moy catheter tip overlies the main pulmonary outflow. Mediastinal drainage tube and left-sided chest tube noted. Negative for   pneumothorax. Mild bibasilar atelectasis left greater than right. No significant effusion.      Impression:      Impression:  1. Postoperative changes of sternotomy with lines and support tubes in expected location.  2. No pneumothorax.  3. Mild bibasilar atelectasis left greater than right.        Electronically Signed: Giovanni Roman MD    12/3/2023 12:11 PM EST    Workstation ID: [id number]            EKG      I personally viewed and interpreted the patient's EKG/Telemetry data:    ECHOCARDIOGRAM:  Results for orders placed during the hospital encounter of 12/01/23    Adult Transthoracic Echo Complete w/ Color, Spectral and Contrast if Necessary Per Protocol    Interpretation Summary    Left ventricular ejection fraction appears to be 61 - 65%.    Saline test results are negative.       STRESS MYOVIEW:  Results for orders placed during the hospital encounter of 11/28/23    Stress Test With Myocardial Perfusion One Day    Interpretation Summary    Left ventricular ejection fraction is normal (Calculated EF = 60%).    High risk for ischemic heart disease.    Myocardial perfusion imaging indicates a large-sized, severe area of ischemia located in the anterior wall, apex and septal wall.    Impressions are consistent with a high risk study.       CARDIAC CATHETERIZATION:  Results for orders placed during the hospital encounter of 12/01/23    Cardiac Catheterization/Vascular Study       OTHER:         Assessment & Plan     Angina with abnormal Myoview  Coronary artery disease  Patient had chest pain with risk factors for coronary disease and had an abnormal Myoview and hence he underwent cardiac  catheterization  Patient had significant left main and three-vessel coronary disease and is scheduled for coronary bypass surgery  Cardiac surgeons have seen the patient is having workup done for pre-CABG.  Patient had an echocardiogram which showed normal LV systolic function  Patient is currently stable on medical therapy with aspirin statins and beta-blockers  Patient had a positive MRSA screen.  Patient had a vein mapping which is adequate on carotid duplex which is normal and his COVID screen is negative.  He also has PFTs which were within normal limits.  Patient underwent coronary artery bypass surgery x 3 vessels with a LIMA to LAD and SVG to the marginal branch and SVG to the RCA without any complications  Patient is currently intubated and is off sedation  Patient's chest tubes output is good  Patient's urine output is good  Will try to extubate him soon.    Hypertension  Patient is on beta-blockers but will be started after he is extubated    Hyperlipidemia  Patient is on Crestor and his lipid levels are followed by the primary care doctor    I discussed the patients findings and my recommendations with patient and nurse    Fred Lopez MD  12/03/23  17:00 EST

## 2023-12-03 NOTE — OP NOTE
Operative Note    Date of Dictation: 12/03/23    Date of Procedure: 12/03/23    Referring Physician: Fred Lopez MD    Preoperative diagnosis: Multivessel CAD    Postoperative diagnosis: Same    Procedure:   1. CABG x 3 (LIMA to LAD, SVG to OM, SVG to RCA)  2. EVH of the right legs    Surgeon: Jeffery Salmon MD     Assistants: KHLOE Gómez was responsible for performing the following activities: Cardiac Surgery First assist, Endoscopic Vein [place] for CABG, surgical wound closure and their skilled assistance was necessary for the success of this case.     Anesthesia: General endotracheal anesthesia and BIGG    Findings:  The saphenous vein was harvested endoscopically form the right  leg. The vein had a diameter of 4 mm and was of good quality. The coronaries had a diameter of 2 mm and were of good quality.      Estimated Blood Loss:  500 mL of Cell Saver returned.    STS Data: The STS Risk score discussed with the patient and family.  Counseling was done regarding abuse of tobacco, alcohol and drugs as needed. They understand and wish to proceed. The antibiotics and b blockers were given in the STS required window.          Description of the procedure:     The patient was placed supine on the operative table. General anesthesia was given and lines placed. The patient was prepped and draped using the usual sterile technique. A median sternotomy was performed with a scalpel and the layers carried down to the sternum using the electrocautery. The sternum was split in the midline using a vertical oscillating saw. Hemostasis was achieved. The LIMA was harvested skeletonized and prepared with papaverine. It was of good quality. 300 units/kg of IV heparin was given to an ACT over 400. A Favaloro retractor was placed and the mediastinum exposed, the pericardium was opened and the edges tacked to the wound. Cannulation sutures were placed in the ascending aorta and right atrium. Small cannulas were placed  and aorto right atrial cardiopulmonary bypass was started. Cardioplegia cannulas were placed. Cardiopulmonary bypass was then established for 45 minutes drifting to 34°C at appropriate flow rates.  The aorta was crossclamped for 38 minutes and we gave 1000 cc of antegrade cold blood cardioplegia then 200L of retrograde cold blood cardioplegia and then repeated doses every 10 to 15 minutes to good effect. 2veins were anastomosed to the ascending aorta with 6-0 Prolene and marked with washers.  The first vein was sewn to the obtuse marginal of the circunflex artery with 7-0 Prolene.  The next vein was sewn to distal right coronary artery with 7-0 Prolene. The LIMA was sewn to the distal LAD with 7-0 Prolene. A warm dose of cardioplegia was given and then the aortic clamp removed as well as the LIMA bulldog clamp. All anastomoses were checked and had good flow and morphology. The left pleural space was suctioned and the lungs ventilated. The heart was paced till regular atrial rhythm resumed. I allowed the heart to eject and once hemodynamics were acceptable, then the CPB was discontinued and the venous and cardioplegia cannulas removed. The matching dose of protamine was given and the aortic cannula removed as well. AV temporary wires and pleural and mediastinal chest tubes were placed and the wound sprayed with platelet rich plasma. The sternum was closed with single and double wires and soft tissue in layers of reabsorbable material. The wounds were covered with sterile dressings.       Specimen removed:  none    CPB time:  45 minutes.    Aortic clamp time:  38 minutes    Complications:  none           Disposition: Cardiovascular recovery room           Condition: Critical but stable.

## 2023-12-03 NOTE — ANESTHESIA PROCEDURE NOTES
Central Line      Patient location during procedure: OR  Start time: 12/3/2023 7:02 AM  Stop Time:12/3/2023 7:12 AM  Indications: central pressure monitoring, vascular access and MD/Surgeon request  Staff  Anesthesiologist: Lio Ramachandran MD  Preanesthetic Checklist  Completed: patient identified, IV checked, site marked, risks and benefits discussed, surgical consent, monitors and equipment checked, pre-op evaluation and timeout performed  Central Line Prep  Sterile Tech:gloves, cap, gown, mask and sterile barriers  Prep: chloraprep  Patient monitoring: blood pressure monitoring, continuous pulse oximetry and EKG  Central Line Procedure  Laterality:right  Location:internal jugular  Catheter Type:double lumen and MAC  Catheter Size:9 Fr  Guidance:ultrasound guided  PROCEDURE NOTE/ULTRASOUND INTERPRETATION.  Using ultrasound guidance the potential vascular sites for insertion of the catheter were visualized to determine the patency of the vessel to be used for vascular access.  After selecting the appropriate site for insertion, the needle was visualized under ultrasound being inserted into the internal jugular vein, followed by ultrasound confirmation of wire and catheter placement. There were no abnormalities seen on ultrasound; an image was taken; and the patient tolerated the procedure with no complications. Images: still images obtained, printed/placed on chart  Assessment  Post procedure:biopatch applied, line sutured and occlusive dressing applied  Assessement:blood return through all ports, free fluid flow, chest x-ray ordered and Daniel Test  Complications:no  Patient Tolerance:patient tolerated the procedure well with no apparent complications  Additional Notes  R IJ MAC VIA ASEPTIC SELDINGER TECH THRU ANTERIOR APPROACH US GUIDANCE X1

## 2023-12-04 ENCOUNTER — APPOINTMENT (OUTPATIENT)
Dept: GENERAL RADIOLOGY | Facility: HOSPITAL | Age: 58
DRG: 234 | End: 2023-12-04
Payer: COMMERCIAL

## 2023-12-04 LAB
ALBUMIN SERPL-MCNC: 4 G/DL (ref 3.5–5.2)
ANION GAP SERPL CALCULATED.3IONS-SCNC: 9 MMOL/L (ref 5–15)
ARTERIAL PATENCY WRIST A: ABNORMAL
BASE DEFICIT: -1.7 MEQ/LITER
BASE EXCESS BLDA CALC-SCNC: -1.9 MMOL/L (ref 0–3)
BASOPHILS # BLD AUTO: 0 10*3/MM3 (ref 0–0.2)
BASOPHILS NFR BLD AUTO: 0.3 % (ref 0–1.5)
BDY SITE: ABNORMAL
BUN SERPL-MCNC: 10 MG/DL (ref 6–20)
BUN/CREAT SERPL: 16.4 (ref 7–25)
CA-I SERPL ISE-MCNC: 1.22 MMOL/L (ref 1.2–1.3)
CALCIUM SPEC-SCNC: 8.7 MG/DL (ref 8.6–10.5)
CHLORIDE SERPL-SCNC: 105 MMOL/L (ref 98–107)
CO2 BLDA-SCNC: 25.5 MMOL/L (ref 22–29)
CO2 SERPL-SCNC: 24 MMOL/L (ref 22–29)
CREAT SERPL-MCNC: 0.61 MG/DL (ref 0.76–1.27)
DEPRECATED RDW RBC AUTO: 45.9 FL (ref 37–54)
EGFRCR SERPLBLD CKD-EPI 2021: 112 ML/MIN/1.73
EOSINOPHIL # BLD AUTO: 0 10*3/MM3 (ref 0–0.4)
EOSINOPHIL NFR BLD AUTO: 0 % (ref 0.3–6.2)
ERYTHROCYTE [DISTWIDTH] IN BLOOD BY AUTOMATED COUNT: 12.5 % (ref 12.3–15.4)
GLUCOSE BLDC GLUCOMTR-MCNC: 113 MG/DL (ref 70–105)
GLUCOSE BLDC GLUCOMTR-MCNC: 118 MG/DL (ref 70–105)
GLUCOSE BLDC GLUCOMTR-MCNC: 120 MG/DL (ref 70–105)
GLUCOSE BLDC GLUCOMTR-MCNC: 120 MG/DL (ref 70–105)
GLUCOSE BLDC GLUCOMTR-MCNC: 121 MG/DL (ref 70–105)
GLUCOSE BLDC GLUCOMTR-MCNC: 122 MG/DL (ref 70–105)
GLUCOSE BLDC GLUCOMTR-MCNC: 126 MG/DL (ref 70–105)
GLUCOSE BLDC GLUCOMTR-MCNC: 128 MG/DL (ref 70–105)
GLUCOSE BLDC GLUCOMTR-MCNC: 129 MG/DL (ref 70–105)
GLUCOSE BLDC GLUCOMTR-MCNC: 144 MG/DL (ref 70–105)
GLUCOSE BLDC GLUCOMTR-MCNC: 96 MG/DL (ref 70–105)
GLUCOSE BLDC GLUCOMTR-MCNC: 99 MG/DL (ref 70–105)
GLUCOSE SERPL-MCNC: 157 MG/DL (ref 65–99)
HCO3 MIXED: 24.1 MMOL/L (ref 21–29)
HCT VFR BLD AUTO: 36.8 % (ref 37.5–51)
HEMODILUTION: NO
HGB BLD-MCNC: 12.3 G/DL (ref 13–17.7)
INHALED O2 CONCENTRATION: 36 %
INR PPP: 1.06 (ref 0.93–1.1)
LYMPHOCYTES # BLD AUTO: 0.7 10*3/MM3 (ref 0.7–3.1)
LYMPHOCYTES NFR BLD AUTO: 4.7 % (ref 19.6–45.3)
MAGNESIUM SERPL-MCNC: 2.8 MG/DL (ref 1.6–2.6)
MCH RBC QN AUTO: 33.3 PG (ref 26.6–33)
MCHC RBC AUTO-ENTMCNC: 33.4 G/DL (ref 31.5–35.7)
MCV RBC AUTO: 99.6 FL (ref 79–97)
MODALITY: ABNORMAL
MONOCYTES # BLD AUTO: 1.3 10*3/MM3 (ref 0.1–0.9)
MONOCYTES NFR BLD AUTO: 8.5 % (ref 5–12)
NEUTROPHILS NFR BLD AUTO: 13.2 10*3/MM3 (ref 1.7–7)
NEUTROPHILS NFR BLD AUTO: 86.5 % (ref 42.7–76)
NRBC BLD AUTO-RTO: 0 /100 WBC (ref 0–0.2)
O2 SATURATION MIXED: 59.5 %
PCO2 MIXED: 44.8 MMHG (ref 35–51)
PH MIXED: 7.34 PH UNITS (ref 7.32–7.45)
PHOSPHATE SERPL-MCNC: 3.4 MG/DL (ref 2.5–4.5)
PLATELET # BLD AUTO: 198 10*3/MM3 (ref 140–450)
PMV BLD AUTO: 9.1 FL (ref 6–12)
PO2 MIXED: 33.1 MMHG
POTASSIUM SERPL-SCNC: 4.4 MMOL/L (ref 3.5–5.2)
PROTHROMBIN TIME: 11.5 SECONDS (ref 9.6–11.7)
RBC # BLD AUTO: 3.7 10*6/MM3 (ref 4.14–5.8)
SODIUM SERPL-SCNC: 138 MMOL/L (ref 136–145)
WBC NRBC COR # BLD AUTO: 15.3 10*3/MM3 (ref 3.4–10.8)

## 2023-12-04 PROCEDURE — 99232 SBSQ HOSP IP/OBS MODERATE 35: CPT | Performed by: INTERNAL MEDICINE

## 2023-12-04 PROCEDURE — 25010000002 ENOXAPARIN PER 10 MG: Performed by: NURSE PRACTITIONER

## 2023-12-04 PROCEDURE — 97162 PT EVAL MOD COMPLEX 30 MIN: CPT

## 2023-12-04 PROCEDURE — 85025 COMPLETE CBC W/AUTO DIFF WBC: CPT | Performed by: NURSE PRACTITIONER

## 2023-12-04 PROCEDURE — 25010000002 FUROSEMIDE PER 20 MG: Performed by: PHYSICIAN ASSISTANT

## 2023-12-04 PROCEDURE — 25010000002 MAGNESIUM SULFATE IN D5W 1G/100ML (PREMIX) 1-5 GM/100ML-% SOLUTION: Performed by: NURSE PRACTITIONER

## 2023-12-04 PROCEDURE — 97167 OT EVAL HIGH COMPLEX 60 MIN: CPT

## 2023-12-04 PROCEDURE — 25010000002 VANCOMYCIN HCL 1.25 G RECONSTITUTED SOLUTION 1 EACH VIAL: Performed by: NURSE PRACTITIONER

## 2023-12-04 PROCEDURE — 71045 X-RAY EXAM CHEST 1 VIEW: CPT

## 2023-12-04 PROCEDURE — 93010 ELECTROCARDIOGRAM REPORT: CPT | Performed by: INTERNAL MEDICINE

## 2023-12-04 PROCEDURE — 82803 BLOOD GASES ANY COMBINATION: CPT

## 2023-12-04 PROCEDURE — 80069 RENAL FUNCTION PANEL: CPT | Performed by: NURSE PRACTITIONER

## 2023-12-04 PROCEDURE — 82948 REAGENT STRIP/BLOOD GLUCOSE: CPT

## 2023-12-04 PROCEDURE — 25010000002 CEFAZOLIN PER 500 MG: Performed by: NURSE PRACTITIONER

## 2023-12-04 PROCEDURE — 25810000003 SODIUM CHLORIDE 0.9 % SOLUTION 250 ML FLEX CONT: Performed by: NURSE PRACTITIONER

## 2023-12-04 PROCEDURE — 25010000002 ACETAMINOPHEN 10 MG/ML SOLUTION: Performed by: NURSE PRACTITIONER

## 2023-12-04 PROCEDURE — 82330 ASSAY OF CALCIUM: CPT | Performed by: NURSE PRACTITIONER

## 2023-12-04 PROCEDURE — 85610 PROTHROMBIN TIME: CPT | Performed by: NURSE PRACTITIONER

## 2023-12-04 PROCEDURE — 25010000002 MORPHINE PER 10 MG: Performed by: NURSE PRACTITIONER

## 2023-12-04 PROCEDURE — 93005 ELECTROCARDIOGRAM TRACING: CPT | Performed by: NURSE PRACTITIONER

## 2023-12-04 PROCEDURE — 83735 ASSAY OF MAGNESIUM: CPT | Performed by: NURSE PRACTITIONER

## 2023-12-04 RX ORDER — LORAZEPAM 1 MG/1
2 TABLET ORAL
Status: DISCONTINUED | OUTPATIENT
Start: 2023-12-04 | End: 2023-12-08 | Stop reason: HOSPADM

## 2023-12-04 RX ORDER — INSULIN LISPRO 100 [IU]/ML
2-9 INJECTION, SOLUTION INTRAVENOUS; SUBCUTANEOUS
Status: DISCONTINUED | OUTPATIENT
Start: 2023-12-04 | End: 2023-12-08 | Stop reason: HOSPADM

## 2023-12-04 RX ORDER — CYCLOBENZAPRINE HCL 10 MG
10 TABLET ORAL 3 TIMES DAILY
Status: DISCONTINUED | OUTPATIENT
Start: 2023-12-04 | End: 2023-12-05

## 2023-12-04 RX ORDER — LORAZEPAM 2 MG/ML
1 INJECTION INTRAMUSCULAR
Status: DISCONTINUED | OUTPATIENT
Start: 2023-12-04 | End: 2023-12-08 | Stop reason: HOSPADM

## 2023-12-04 RX ORDER — HYDROCODONE BITARTRATE AND ACETAMINOPHEN 5; 325 MG/1; MG/1
1 TABLET ORAL ONCE
Status: COMPLETED | OUTPATIENT
Start: 2023-12-04 | End: 2023-12-07

## 2023-12-04 RX ORDER — FOLIC ACID 1 MG/1
1 TABLET ORAL DAILY
Status: DISCONTINUED | OUTPATIENT
Start: 2023-12-04 | End: 2023-12-08 | Stop reason: HOSPADM

## 2023-12-04 RX ORDER — LORAZEPAM 2 MG/ML
2 INJECTION INTRAMUSCULAR
Status: DISCONTINUED | OUTPATIENT
Start: 2023-12-04 | End: 2023-12-08 | Stop reason: HOSPADM

## 2023-12-04 RX ORDER — NICOTINE POLACRILEX 4 MG
15 LOZENGE BUCCAL
Status: DISCONTINUED | OUTPATIENT
Start: 2023-12-04 | End: 2023-12-08 | Stop reason: HOSPADM

## 2023-12-04 RX ORDER — LORAZEPAM 1 MG/1
1 TABLET ORAL
Status: DISCONTINUED | OUTPATIENT
Start: 2023-12-04 | End: 2023-12-08 | Stop reason: HOSPADM

## 2023-12-04 RX ORDER — MULTIPLE VITAMINS W/ MINERALS TAB 9MG-400MCG
1 TAB ORAL DAILY
Status: DISCONTINUED | OUTPATIENT
Start: 2023-12-04 | End: 2023-12-08 | Stop reason: HOSPADM

## 2023-12-04 RX ORDER — IBUPROFEN 600 MG/1
1 TABLET ORAL
Status: DISCONTINUED | OUTPATIENT
Start: 2023-12-04 | End: 2023-12-08 | Stop reason: HOSPADM

## 2023-12-04 RX ORDER — DEXTROSE MONOHYDRATE 25 G/50ML
25 INJECTION, SOLUTION INTRAVENOUS
Status: DISCONTINUED | OUTPATIENT
Start: 2023-12-04 | End: 2023-12-08 | Stop reason: HOSPADM

## 2023-12-04 RX ORDER — FUROSEMIDE 10 MG/ML
20 INJECTION INTRAMUSCULAR; INTRAVENOUS ONCE
Status: COMPLETED | OUTPATIENT
Start: 2023-12-04 | End: 2023-12-04

## 2023-12-04 RX ADMIN — MORPHINE SULFATE 2 MG: 2 INJECTION, SOLUTION INTRAMUSCULAR; INTRAVENOUS at 15:32

## 2023-12-04 RX ADMIN — HYDROCODONE BITARTRATE AND ACETAMINOPHEN 1 TABLET: 5; 325 TABLET ORAL at 14:35

## 2023-12-04 RX ADMIN — ATORVASTATIN CALCIUM 40 MG: 40 TABLET, FILM COATED ORAL at 20:17

## 2023-12-04 RX ADMIN — MORPHINE SULFATE 2 MG: 2 INJECTION, SOLUTION INTRAMUSCULAR; INTRAVENOUS at 22:14

## 2023-12-04 RX ADMIN — Medication 1 TABLET: at 17:41

## 2023-12-04 RX ADMIN — MORPHINE SULFATE 2 MG: 2 INJECTION, SOLUTION INTRAMUSCULAR; INTRAVENOUS at 11:31

## 2023-12-04 RX ADMIN — POLYETHYLENE GLYCOL 3350 17 G: 17 POWDER, FOR SOLUTION ORAL at 08:05

## 2023-12-04 RX ADMIN — CYCLOBENZAPRINE 10 MG: 10 TABLET, FILM COATED ORAL at 20:18

## 2023-12-04 RX ADMIN — SODIUM CHLORIDE 2 G: 900 INJECTION INTRAVENOUS at 22:06

## 2023-12-04 RX ADMIN — HYDROCODONE BITARTRATE AND ACETAMINOPHEN 1 TABLET: 5; 325 TABLET ORAL at 09:48

## 2023-12-04 RX ADMIN — VANCOMYCIN HYDROCHLORIDE 1250 MG: 1.25 INJECTION, POWDER, LYOPHILIZED, FOR SOLUTION INTRAVENOUS at 20:18

## 2023-12-04 RX ADMIN — MORPHINE SULFATE 2 MG: 2 INJECTION, SOLUTION INTRAMUSCULAR; INTRAVENOUS at 02:09

## 2023-12-04 RX ADMIN — MORPHINE SULFATE 2 MG: 2 INJECTION, SOLUTION INTRAMUSCULAR; INTRAVENOUS at 07:55

## 2023-12-04 RX ADMIN — ASPIRIN 81 MG: 81 TABLET, COATED ORAL at 08:05

## 2023-12-04 RX ADMIN — SODIUM CHLORIDE 2 G: 900 INJECTION INTRAVENOUS at 14:35

## 2023-12-04 RX ADMIN — POLYETHYLENE GLYCOL 3350 17 G: 17 POWDER, FOR SOLUTION ORAL at 20:17

## 2023-12-04 RX ADMIN — Medication 100 MG: at 17:41

## 2023-12-04 RX ADMIN — MORPHINE SULFATE 2 MG: 2 INJECTION, SOLUTION INTRAMUSCULAR; INTRAVENOUS at 18:42

## 2023-12-04 RX ADMIN — METOPROLOL TARTRATE 25 MG: 25 TABLET, FILM COATED ORAL at 20:17

## 2023-12-04 RX ADMIN — MUPIROCIN: 20 OINTMENT TOPICAL at 08:05

## 2023-12-04 RX ADMIN — 0.12% CHLORHEXIDINE GLUCONATE 15 ML: 1.2 RINSE ORAL at 08:04

## 2023-12-04 RX ADMIN — MAGNESIUM SULFATE IN DEXTROSE 1 G: 10 INJECTION, SOLUTION INTRAVENOUS at 08:05

## 2023-12-04 RX ADMIN — HYDROCODONE BITARTRATE AND ACETAMINOPHEN 1 TABLET: 5; 325 TABLET ORAL at 20:17

## 2023-12-04 RX ADMIN — Medication 12.5 MG: at 09:48

## 2023-12-04 RX ADMIN — CYCLOBENZAPRINE 10 MG: 10 TABLET, FILM COATED ORAL at 16:02

## 2023-12-04 RX ADMIN — MORPHINE SULFATE 2 MG: 2 INJECTION, SOLUTION INTRAMUSCULAR; INTRAVENOUS at 00:12

## 2023-12-04 RX ADMIN — DOCUSATE SODIUM 50 MG AND SENNOSIDES 8.6 MG 2 TABLET: 8.6; 5 TABLET, FILM COATED ORAL at 08:05

## 2023-12-04 RX ADMIN — MAGNESIUM SULFATE IN DEXTROSE 1 G: 10 INJECTION, SOLUTION INTRAVENOUS at 01:19

## 2023-12-04 RX ADMIN — MUPIROCIN: 20 OINTMENT TOPICAL at 20:18

## 2023-12-04 RX ADMIN — VANCOMYCIN HYDROCHLORIDE 1250 MG: 1.25 INJECTION, POWDER, LYOPHILIZED, FOR SOLUTION INTRAVENOUS at 07:59

## 2023-12-04 RX ADMIN — FOLIC ACID 1 MG: 1 TABLET ORAL at 17:41

## 2023-12-04 RX ADMIN — FUROSEMIDE 20 MG: 10 INJECTION, SOLUTION INTRAMUSCULAR; INTRAVENOUS at 09:48

## 2023-12-04 RX ADMIN — PANTOPRAZOLE SODIUM 40 MG: 40 TABLET, DELAYED RELEASE ORAL at 08:05

## 2023-12-04 RX ADMIN — HYDROCODONE BITARTRATE AND ACETAMINOPHEN 1 TABLET: 5; 325 TABLET ORAL at 01:19

## 2023-12-04 RX ADMIN — DOCUSATE SODIUM 50 MG AND SENNOSIDES 8.6 MG 2 TABLET: 8.6; 5 TABLET, FILM COATED ORAL at 20:17

## 2023-12-04 RX ADMIN — 0.12% CHLORHEXIDINE GLUCONATE 15 ML: 1.2 RINSE ORAL at 20:18

## 2023-12-04 RX ADMIN — ACETAMINOPHEN 1000 MG: 1000 INJECTION INTRAVENOUS at 01:19

## 2023-12-04 RX ADMIN — ENOXAPARIN SODIUM 40 MG: 100 INJECTION SUBCUTANEOUS at 15:32

## 2023-12-04 RX ADMIN — SODIUM CHLORIDE 2 G: 900 INJECTION INTRAVENOUS at 06:12

## 2023-12-04 NOTE — PROGRESS NOTES
CARDIOLOGY PROGRESS NOTE:    John Paul Tineo  58 y.o.  male  1965  [id number]      Referring Provider: Cardiac surgeon    Reason for follow-up: Coronary artery disease     Patient Care Team:  Morris Mercado PA-C as PCP - General (Physician Assistant)  Fred Lopez MD as Consulting Physician (Cardiology)    Subjective .  Patient is extubated sitting in chair and chest tubes are draining very well    Objective i sitting in chair comfortably     Review of Systems   Constitutional: Negative for fever and malaise/fatigue.   HENT:  Negative for ear pain and nosebleeds.    Eyes:  Negative for blurred vision and double vision.   Cardiovascular:  Negative for chest pain, dyspnea on exertion and palpitations.   Respiratory:  Negative for cough and shortness of breath.    Skin:  Negative for rash.   Musculoskeletal:  Negative for joint pain.   Gastrointestinal:  Negative for abdominal pain, nausea and vomiting.   Neurological:  Negative for focal weakness and headaches.   Psychiatric/Behavioral:  Negative for depression. The patient is not nervous/anxious.    All other systems reviewed and are negative.      Allergies: Patient has no known allergies.    Scheduled Meds:aspirin, 81 mg, Oral, Daily  atorvastatin, 40 mg, Oral, Nightly  ceFAZolin, 2 g, Intravenous, Q8H  chlorhexidine, 15 mL, Mouth/Throat, Q12H  enoxaparin, 40 mg, Subcutaneous, Daily  insulin lispro, 2-9 Units, Subcutaneous, 4x Daily AC & at Bedtime  metoprolol tartrate, 12.5 mg, Oral, Q12H  mupirocin, , Each Nare, BID  pantoprazole, 40 mg, Oral, Daily  polyethylene glycol, 17 g, Oral, BID  senna-docusate sodium, 2 tablet, Oral, BID  vancomycin, 15 mg/kg, Intravenous, Q12H      Continuous Infusions:Pharmacy to dose vancomycin,       PRN Meds:.  acetaminophen **OR** acetaminophen **OR** acetaminophen    albumin human    Calcium Replacement - Follow Nurse / BPA Driven Protocol    dextrose    dextrose    glucagon (human recombinant)     "HYDROcodone-acetaminophen    Magnesium Cardiology Dose Replacement - Follow Nurse / BPA Driven Protocol    Morphine **AND** naloxone    nitroglycerin    ondansetron    Pharmacy to dose vancomycin    Phosphorus Replacement - Follow Nurse / BPA Driven Protocol    Potassium Replacement - Follow Nurse / BPA Driven Protocol        VITAL SIGNS  Vitals:    12/04/23 0930 12/04/23 1000 12/04/23 1030 12/04/23 1100   BP: 120/71 126/76 119/70 125/73   Pulse: 73 78 74 75   Resp:       Temp:       TempSrc:       SpO2: 97% 96% 94% 94%   Weight:       Height:           Flowsheet Rows      Flowsheet Row First Filed Value   Admission Height 170.2 cm (67\") Documented at 12/01/2023 0715   Admission Weight 90.9 kg (200 lb 6.4 oz) Documented at 12/01/2023 0715             TELEMETRY: Ventricular pacemaker rhythm    Physical Exam:  Constitutional:       Appearance: Well-developed.   Eyes:      General: No scleral icterus.     Conjunctiva/sclera: Conjunctivae normal.      Pupils: Pupils are equal, round, and reactive to light.   HENT:      Head: Normocephalic and atraumatic.   Neck:      Vascular: No carotid bruit or JVD.   Pulmonary:      Effort: Pulmonary effort is normal.      Breath sounds: Normal breath sounds. No wheezing. No rales.   Cardiovascular:      Normal rate. Regular rhythm.   Pulses:     Intact distal pulses.   Abdominal:      General: Bowel sounds are normal.      Palpations: Abdomen is soft.   Musculoskeletal: Normal range of motion.      Cervical back: Normal range of motion and neck supple. Skin:     General: Skin is warm and dry.      Findings: No rash.   Neurological:      Mental Status: Alert.      Comments: No focal deficits          Results Review:   I reviewed the patient's new clinical results.  Lab Results (last 24 hours)       Procedure Component Value Units Date/Time    POC Glucose Once [[id number]]  (Abnormal) Collected: 12/04/23 1120    Specimen: Blood Updated: 12/04/23 1122     Glucose 122 mg/dL      Comment: " Serial Number: [id number]:  [id number]       POC Glucose Once [[id number]]  (Abnormal) Collected: 12/04/23 0839    Specimen: Blood Updated: 12/04/23 0841     Glucose 118 mg/dL      Comment: Serial Number: [id number]:  [id number]       POC Glucose Once [[id number]]  (Normal) Collected: 12/04/23 0733    Specimen: Blood Updated: 12/04/23 0736     Glucose 99 mg/dL      Comment: Serial Number: [id number]:  [id number]       POC Glucose Once [[id number]]  (Abnormal) Collected: 12/04/23 0607    Specimen: Blood Updated: 12/04/23 0608     Glucose 113 mg/dL      Comment: Serial Number: [id number]:  [id number]       Blood Gas, Mixed [[id number]]  (Abnormal) Collected: 12/04/23 0524    Specimen: Blood Updated: 12/04/23 0527     pH, mixed 7.34 pH units      PCO2 MIXED 44.8 mmHg      PO2 MIXED 33.1 mmHg      HCO3 MIXED 24.1 mmol/L      CO2 Content 25.5 mmol/L      Base Excess, Arterial -1.9 mmol/L      Comment: Serial Number: [id number]:  [id number]        O2 SATURATION MIXED 59.5 %      Hemodilution No     Site PA     Valentino's Test N/A     Modality Cannula     FIO2 36 %      Base Deficit -1.7 mEq/liter     POC Glucose Once [[id number]]  (Abnormal) Collected: 12/04/23 0502    Specimen: Blood Updated: 12/04/23 0503     Glucose 120 mg/dL      Comment: Serial Number: [id number]:  [id number]       POC Glucose Once [[id number]]  (Abnormal) Collected: 12/04/23 0416    Specimen: Blood Updated: 12/04/23 0418     Glucose 129 mg/dL      Comment: Serial Number: [id number]:  [id number]       Renal Function Panel [[id number]]  (Abnormal) Collected: 12/04/23 0310    Specimen: Blood Updated: 12/04/23 0345     Glucose 157 mg/dL      BUN 10 mg/dL      Creatinine 0.61 mg/dL      Sodium 138 mmol/L      Potassium 4.4 mmol/L      Chloride 105 mmol/L      CO2 24.0 mmol/L      Calcium 8.7 mg/dL      Albumin 4.0 g/dL      Phosphorus 3.4 mg/dL      Anion Gap 9.0 mmol/L      BUN/Creatinine Ratio 16.4     eGFR 112.0 mL/min/1.73      Narrative:      GFR Normal >60  Chronic Kidney Disease <60  Kidney Failure <15      Magnesium [[id number]]  (Abnormal) Collected: 12/04/23 0310    Specimen: Blood Updated: 12/04/23 0345     Magnesium 2.8 mg/dL     Protime-INR [[id number]]  (Normal) Collected: 12/04/23 0310    Specimen: Blood Updated: 12/04/23 0337     Protime 11.5 Seconds      INR 1.06    Calcium, Ionized [[id number]]  (Normal) Collected: 12/04/23 0310    Specimen: Blood Updated: 12/04/23 0334     Ionized Calcium 1.22 mmol/L     CBC & Differential [[id number]]  (Abnormal) Collected: 12/04/23 0310    Specimen: Blood Updated: 12/04/23 0320    Narrative:      The following orders were created for panel order CBC & Differential.  Procedure                               Abnormality         Status                     ---------                               -----------         ------                     CBC Auto Differential[[id number]]        Abnormal            Final result                 Please view results for these tests on the individual orders.    CBC Auto Differential [[id number]]  (Abnormal) Collected: 12/04/23 0310    Specimen: Blood Updated: 12/04/23 0320     WBC 15.30 10*3/mm3      RBC 3.70 10*6/mm3      Hemoglobin 12.3 g/dL      Hematocrit 36.8 %      MCV 99.6 fL      MCH 33.3 pg      MCHC 33.4 g/dL      RDW 12.5 %      RDW-SD 45.9 fl      MPV 9.1 fL      Platelets 198 10*3/mm3      Neutrophil % 86.5 %      Lymphocyte % 4.7 %      Monocyte % 8.5 %      Eosinophil % 0.0 %      Basophil % 0.3 %      Neutrophils, Absolute 13.20 10*3/mm3      Lymphocytes, Absolute 0.70 10*3/mm3      Monocytes, Absolute 1.30 10*3/mm3      Eosinophils, Absolute 0.00 10*3/mm3      Basophils, Absolute 0.00 10*3/mm3      nRBC 0.0 /100 WBC     POC Glucose Once [[id number]]  (Abnormal) Collected: 12/04/23 0311    Specimen: Blood Updated: 12/04/23 0316     Glucose 144 mg/dL      Comment: Serial Number: [id number]:  [id number]       POC Glucose Once [[id number]]   (Abnormal) Collected: 12/04/23 0213    Specimen: Blood Updated: 12/04/23 0215     Glucose 121 mg/dL      Comment: Serial Number: [id number]:  [id number]       POC Glucose Once [[id number]]  (Abnormal) Collected: 12/04/23 0124    Specimen: Blood Updated: 12/04/23 0126     Glucose 126 mg/dL      Comment: Serial Number: [id number]:  [id number]       POC Glucose Once [[id number]]  (Abnormal) Collected: 12/04/23 0014    Specimen: Blood Updated: 12/04/23 0015     Glucose 128 mg/dL      Comment: Serial Number: [id number]:  [id number]       POC Glucose Once [[id number]]  (Abnormal) Collected: 12/03/23 2329    Specimen: Blood Updated: 12/03/23 2331     Glucose 137 mg/dL      Comment: Serial Number: [id number]:  [id number]       POC Glucose Once [[id number]]  (Abnormal) Collected: 12/03/23 2219    Specimen: Blood Updated: 12/03/23 2220     Glucose 136 mg/dL      Comment: Serial Number: [id number]:  [id number]       POC Glucose Once [[id number]]  (Abnormal) Collected: 12/03/23 2111    Specimen: Blood Updated: 12/03/23 2112     Glucose 125 mg/dL      Comment: Serial Number: [id number]:  [id number]       POC Glucose Once [[id number]]  (Abnormal) Collected: 12/03/23 2009    Specimen: Blood Updated: 12/03/23 2011     Glucose 123 mg/dL      Comment: Serial Number: [id number]:  [id number]       POC Glucose Once [[id number]]  (Abnormal) Collected: 12/03/23 1908    Specimen: Blood Updated: 12/03/23 1909     Glucose 110 mg/dL      Comment: Serial Number: [id number]:  [id number]       POC Glucose Once [[id number]]  (Abnormal) Collected: 12/03/23 1807    Specimen: Blood Updated: 12/03/23 1808     Glucose 141 mg/dL      Comment: Serial Number: [id number]:  [id number]       Blood Gas, Arterial - [[id number]]  (Abnormal) Collected: 12/03/23 1705    Specimen: Arterial Blood Updated: 12/03/23 1709     Site Arterial Line     Valentino's Test N/A     pH, Arterial 7.307 pH units      pCO2, Arterial 47.3  mm Hg      pO2, Arterial 68.2 mm Hg      HCO3, Arterial 23.6 mmol/L      Base Excess, Arterial -3.0 mmol/L      Comment: Serial Number: [id number]:  [id number]        O2 Saturation, Arterial 91.3 %      CO2 Content 25.1 mmol/L      Barometric Pressure for Blood Gas --     Comment: N/A        Modality Cannula     FIO2 36 %      Hemodilution No    POCT Electrolytes +HGB +HCT [[id number]]  (Abnormal) Collected: 12/03/23 1705    Specimen: Arterial Blood Updated: 12/03/23 1709     Sodium 140 mmol/L      POC Potassium 4.2 mmol/L      Ionized Calcium 1.22 mmol/L      Comment: Serial Number: [id number]:  [id number]        Glucose 166 mg/dL      Hematocrit 40 %      Hemoglobin 13.5 g/dL     POC Glucose Once [[id number]]  (Abnormal) Collected: 12/03/23 1705    Specimen: Arterial Blood Updated: 12/03/23 1709     Glucose 166 mg/dL      Comment: Serial Number: [id number]:  [id number]       Blood Gas, Arterial - [[id number]]  (Abnormal) Collected: 12/03/23 1521    Specimen: Arterial Blood Updated: 12/03/23 1533     Site Arterial Line     Valentino's Test N/A     pH, Arterial 7.279 pH units      pCO2, Arterial 49.0 mm Hg      pO2, Arterial 76.4 mm Hg      HCO3, Arterial 23.0 mmol/L      Base Excess, Arterial -4.1 mmol/L      Comment: Serial Number: [id number]:  [id number]        O2 Saturation, Arterial 93.1 %      CO2 Content 24.5 mmol/L      Barometric Pressure for Blood Gas --     Comment: N/A        Modality Adult Vent     FIO2 40 %      Ventilator Mode CPAP/PS     PEEP 5     Hemodilution No    POCT Electrolytes +HGB +HCT [[id number]]  (Abnormal) Collected: 12/03/23 1521    Specimen: Arterial Blood Updated: 12/03/23 1533     Sodium 141 mmol/L      POC Potassium 4.3 mmol/L      Ionized Calcium 1.19 mmol/L      Comment: Serial Number: [id number]:  [id number]        Glucose 134 mg/dL      Hematocrit 38 %      Hemoglobin 13.1 g/dL     POC Glucose Once [[id number]]  (Abnormal) Collected: 12/03/23 1521    Specimen: Arterial Blood Updated: 12/03/23  1533     Glucose 134 mg/dL      Comment: Serial Number: [id number]:  [id number]       Blood Gas, Arterial - [[id number]]  (Abnormal) Collected: 12/03/23 1356    Specimen: Arterial Blood Updated: 12/03/23 1402     Site Arterial Line     Valentino's Test N/A     pH, Arterial 7.262 pH units      pCO2, Arterial 52.6 mm Hg      pO2, Arterial 80.2 mm Hg      HCO3, Arterial 23.7 mmol/L      Base Excess, Arterial -3.9 mmol/L      Comment: Serial Number: [id number]:  [id number]        O2 Saturation, Arterial 93.6 %      CO2 Content 25.4 mmol/L      Barometric Pressure for Blood Gas --     Comment: N/A        Modality Adult Vent     FIO2 40 %      Ventilator Mode CPAP/PS     PEEP 5     Hemodilution No    POCT Electrolytes +HGB +HCT [[id number]]  (Abnormal) Collected: 12/03/23 1356    Specimen: Arterial Blood Updated: 12/03/23 1402     Sodium 141 mmol/L      POC Potassium 4.3 mmol/L      Ionized Calcium 1.21 mmol/L      Comment: Serial Number: [id number]:  [id number]        Glucose 132 mg/dL      Hematocrit 40 %      Hemoglobin 13.5 g/dL     POC Glucose Once [[id number]]  (Abnormal) Collected: 12/03/23 1356    Specimen: Arterial Blood Updated: 12/03/23 1402     Glucose 132 mg/dL      Comment: Serial Number: [id number]:  [id number]       Blood Gas, Arterial - [[id number]]  (Abnormal) Collected: 12/03/23 1314    Specimen: Arterial Blood Updated: 12/03/23 1318     Site Arterial Line     Valentino's Test N/A     pH, Arterial 7.329 pH units      pCO2, Arterial 41.9 mm Hg      pO2, Arterial 82.5 mm Hg      HCO3, Arterial 22.0 mmol/L      Base Excess, Arterial -3.8 mmol/L      Comment: Serial Number: [id number]:  [id number]        O2 Saturation, Arterial 95.2 %      CO2 Content 23.3 mmol/L      Barometric Pressure for Blood Gas --     Comment: N/A        Modality Adult Vent     FIO2 50 %      Ventilator Mode AC     Set Tidal Volume 550     PEEP 5     Hemodilution No     Respiratory Rate 20    POCT Electrolytes +HGB +HCT [[id number]]  (Abnormal)  Collected: 12/03/23 1314    Specimen: Arterial Blood Updated: 12/03/23 1318     Sodium 140 mmol/L      POC Potassium 4.3 mmol/L      Ionized Calcium 1.18 mmol/L      Comment: Serial Number: [id number]:  [id number]        Glucose 118 mg/dL      Hematocrit 39 %      Hemoglobin 13.2 g/dL     POC Glucose Once [[id number]]  (Abnormal) Collected: 12/03/23 1314    Specimen: Arterial Blood Updated: 12/03/23 1318     Glucose 118 mg/dL      Comment: Serial Number: [id number]:  [id number]       POCT Electrolytes +HGB +HCT [[id number]]  (Abnormal) Collected: 12/03/23 1307    Specimen: Arterial Blood Updated: 12/03/23 1312     Sodium 141 mmol/L      POC Potassium 4.3 mmol/L      Ionized Calcium 1.20 mmol/L      Comment: Serial Number: [id number]:  [id number]        Glucose 119 mg/dL      Hematocrit 39 %      Hemoglobin 13.3 g/dL     POC Glucose Once [[id number]]  (Abnormal) Collected: 12/03/23 1307    Specimen: Arterial Blood Updated: 12/03/23 1312     Glucose 119 mg/dL      Comment: Serial Number: [id number]:  [id number]       Blood Gas, Arterial - [[id number]]  (Abnormal) Collected: 12/03/23 1224    Specimen: Arterial Blood Updated: 12/03/23 1231     Site Arterial Line     Valentino's Test N/A     pH, Arterial 7.322 pH units      pCO2, Arterial 45.3 mm Hg      pO2, Arterial 78.4 mm Hg      HCO3, Arterial 23.4 mmol/L      Base Excess, Arterial -2.8 mmol/L      Comment: Serial Number: [id number]:  [id number]        O2 Saturation, Arterial 94.3 %      CO2 Content 24.8 mmol/L      Barometric Pressure for Blood Gas --     Comment: N/A        Modality Adult Vent     FIO2 50 %      Ventilator Mode AC     Set Tidal Volume 550     PEEP 8     Hemodilution No     Respiratory Rate 18    POCT Electrolytes +HGB +HCT [[id number]]  (Abnormal) Collected: 12/03/23 1224    Specimen: Arterial Blood Updated: 12/03/23 1231     Sodium 141 mmol/L      POC Potassium 4.3 mmol/L      Ionized Calcium 1.22 mmol/L      Comment: Serial Number: [id number]:  [id number]         Glucose 118 mg/dL      Hematocrit 40 %      Hemoglobin 13.6 g/dL     POC Glucose Once [[id number]]  (Abnormal) Collected: 12/03/23 1224    Specimen: Arterial Blood Updated: 12/03/23 1231     Glucose 118 mg/dL      Comment: Serial Number: [id number]:  [id number]               Imaging Results (Last 24 Hours)       Procedure Component Value Units Date/Time    XR Chest 1 View [[id number]] Collected: 12/04/23 0728     Updated: 12/04/23 0733    Narrative:      XR CHEST 1 VW    Date of Exam: 12/4/2023 4:14 AM EST    Indication: Post-Op Heart Surgery    Comparison: 12/3/2023    Findings:  The trachea is midline. Interval removal of endotracheal tube. Left basilar chest tube in place. No change in position of a right internal jugular Buttonwillow-Moy catheter. Cardiomegaly with stable changes post CABG. Left lower atelectasis and a left pleural   effusion. Minimal right basilar atelectasis. The right lung is otherwise clear      Impression:      Impression:  Interval removal of endotracheal tube    Mild increase in left lower atelectasis with a small effusion.    Minimal right basilar atelectasis      Electronically Signed: Rosales Pak MD    12/4/2023 7:31 AM EST    Workstation ID: [id number]    XR Chest 1 View [[id number]] Collected: 12/03/23 1210     Updated: 12/03/23 1213    Narrative:      XR CHEST 1 VW    Date of Exam: 12/3/2023 10:47 AM EST    Indication: Post-Op Check Line & Tube Placement    Comparison: 12/1/2023    Findings:  ET tube is above the tiffany. Status post sternotomy. Esophagogastric tube tip overlies the body of the stomach. Right IJ Buttonwillow-Moy catheter tip overlies the main pulmonary outflow. Mediastinal drainage tube and left-sided chest tube noted. Negative for   pneumothorax. Mild bibasilar atelectasis left greater than right. No significant effusion.      Impression:      Impression:  1. Postoperative changes of sternotomy with lines and support tubes in expected location.  2. No pneumothorax.  3. Mild  bibasilar atelectasis left greater than right.        Electronically Signed: Giovanni Roman MD    12/3/2023 12:11 PM EST    Workstation ID: [id number]            EKG      I personally viewed and interpreted the patient's EKG/Telemetry data:    ECHOCARDIOGRAM:  Results for orders placed during the hospital encounter of 12/01/23    Adult Transthoracic Echo Complete w/ Color, Spectral and Contrast if Necessary Per Protocol    Interpretation Summary    Left ventricular ejection fraction appears to be 61 - 65%.    Saline test results are negative.       STRESS MYOVIEW:  Results for orders placed during the hospital encounter of 11/28/23    Stress Test With Myocardial Perfusion One Day    Interpretation Summary    Left ventricular ejection fraction is normal (Calculated EF = 60%).    High risk for ischemic heart disease.    Myocardial perfusion imaging indicates a large-sized, severe area of ischemia located in the anterior wall, apex and septal wall.    Impressions are consistent with a high risk study.       CARDIAC CATHETERIZATION:  Results for orders placed during the hospital encounter of 12/01/23    Cardiac Catheterization/Vascular Study       OTHER:         Assessment & Plan     Angina with abnormal Myoview  Coronary artery disease  Patient had chest pain with risk factors for coronary disease and had an abnormal Myoview and hence he underwent cardiac catheterization  Patient had significant left main and three-vessel coronary disease and is scheduled for coronary bypass surgery  Cardiac surgeons have seen the patient is having workup done for pre-CABG.  Patient had an echocardiogram which showed normal LV systolic function  Patient is currently stable on medical therapy with aspirin statins and beta-blockers  Patient had a positive MRSA screen.  Patient had a vein mapping which is adequate on carotid duplex which is normal and his COVID screen is negative.  He also has PFTs which were within normal limits.  Patient  underwent coronary artery bypass surgery x 3 vessels with a LIMA to LAD and SVG to the marginal branch and SVG to the RCA without any complications  Patient is extubated and sitting in chair  Patient's chest tubes are draining very well and can be removed soon  Patient is off vasopressors and will start him on oral medicines including aspirin beta-blockers and statins    Hypertension  Patient is still being paced and hence we will start the beta-blockers once he is stable.    Hyperlipidemia  Patient is on Crestor and his lipid levels are followed by the primary care doctor    I discussed the patients findings and my recommendations with patient and nurse    Fred Lopez MD  12/04/23  12:02 EST

## 2023-12-04 NOTE — PROGRESS NOTES
" LOS: 3 days   Patient Care Team:  Morris Mercado PA-C as PCP - General (Physician Assistant)  Fred Lopez MD as Consulting Physician (Cardiology)    Chief Complaint: s/p CABG      Subjective  Reports feeling ok and states he knows things will keep getting easier.     Drips: insulin  CVP 8      Objective     Vital Signs  Temp:  [96.3 °F (35.7 °C)-99 °F (37.2 °C)] 99 °F (37.2 °C)  Heart Rate:  [73-94] 75  Resp:  [14-28] 16  BP: ()/(58-87) 125/73  FiO2 (%):  [40 %-50 %] 40 %  Body mass index is 31.77 kg/m².    Intake/Output Summary (Last 24 hours) at 12/4/2023 1127  Last data filed at 12/4/2023 1000  Gross per 24 hour   Intake 4037 ml   Output 1760 ml   Net 2277 ml     I/O this shift:  In: 240 [P.O.:240]  Out: 325 [Urine:325]    Chest tube drainage last 8 hours: 100 cc    Wt Readings from Last 3 Encounters:   12/04/23 92 kg (202 lb 13.2 oz)   11/15/23 91.6 kg (202 lb)   08/28/20 92.9 kg (204 lb 12.9 oz)       Flowsheet Rows      Flowsheet Row First Filed Value   Admission Height 170.2 cm (67\") Documented at 12/01/2023 0715   Admission Weight 90.9 kg (200 lb 6.4 oz) Documented at 12/01/2023 0715            Objective:  General Appearance:  In no acute distress, not in pain and comfortable (Up in chair).    Vital signs: (most recent): Blood pressure 125/73, pulse 75, temperature 99 °F (37.2 °C), resp. rate 16, height 170.2 cm (67\"), weight 92 kg (202 lb 13.2 oz), SpO2 94%.  Vital signs are normal.  No fever.    Output: Producing urine and producing stool.    Lungs:  Normal effort and normal respiratory rate.  There are decreased breath sounds (bases bilaterally).    Heart: Normal rate.  (HR 70s)  Abdomen: Abdomen is soft.    Extremities: Normal range of motion.  There is no dependent edema.    Neurological: Patient is alert and oriented to person, place and time.    Skin:  Warm and dry.  (Dressing c/d/i)              Results Review:        Results from last 7 days   Lab Units 12/04/23  0310 12/03/23  9735 " 12/03/23  1521 12/03/23  1053 12/03/23  1023 12/03/23  0737 12/02/23  0606   WBC 10*3/mm3 15.30*  --   --   --  17.50*  --  7.00   HEMOGLOBIN g/dL 12.3*  --   --   --  13.5  --  14.9   HEMOGLOBIN, POC g/dL  --  13.5 13.1   < >  --    < >  --    HEMATOCRIT % 36.8*  --   --   --  40.6  --  44.1   HEMATOCRIT POC %  --  40 38   < >  --    < >  --    PLATELETS 10*3/mm3 198  --   --   --  196  --  237    < > = values in this interval not displayed.         PT/INR:    Protime   Date Value Ref Range Status   12/04/2023 11.5 9.6 - 11.7 Seconds Final   12/03/2023 11.8 (H) 9.6 - 11.7 Seconds Final   12/02/2023 10.4 9.6 - 11.7 Seconds Final   /  INR   Date Value Ref Range Status   12/04/2023 1.06 0.93 - 1.10 Final   12/03/2023 1.09 0.93 - 1.10 Final   12/02/2023 0.95 0.93 - 1.10 Final       Results from last 7 days   Lab Units 12/04/23  0310 12/03/23  1023 12/02/23  0606   SODIUM mmol/L 138 138 138   POTASSIUM mmol/L 4.4 4.9 4.5   CHLORIDE mmol/L 105 103 103   CO2 mmol/L 24.0 26.0 27.0   BUN mg/dL 10 10 11   CREATININE mg/dL 0.61* 0.73* 0.73*   GLUCOSE mg/dL 157* 123* 113*   CALCIUM mg/dL 8.7 9.2 9.4         Scheduled Meds:  aspirin, 81 mg, Oral, Daily  atorvastatin, 40 mg, Oral, Nightly  ceFAZolin, 2 g, Intravenous, Q8H  chlorhexidine, 15 mL, Mouth/Throat, Q12H  enoxaparin, 40 mg, Subcutaneous, Daily  insulin lispro, 2-9 Units, Subcutaneous, 4x Daily AC & at Bedtime  metoprolol tartrate, 12.5 mg, Oral, Q12H  mupirocin, , Each Nare, BID  pantoprazole, 40 mg, Oral, Daily  polyethylene glycol, 17 g, Oral, BID  senna-docusate sodium, 2 tablet, Oral, BID  vancomycin, 15 mg/kg, Intravenous, Q12H        Infusions:  Pharmacy to dose vancomycin,         Assessment & Plan    - MV CAD including left main disease, EF 60% (stress test)--s/p CABG x 3 ([place])  - HTN--stable  - HLD--statin  - Preop sinus bradycardia  - Tobacco abuse--35 pack year hx, PFTs noted, cessation d/w pt  - MRSA nasal colonization--mupirocin/ vanc       POD  #1. Doing well. Discontinue Hollister and arterial line. Start Lopressor 12.5 mg bid. Give Lasix 20 mg IV once. Transition to sliding scale insulin. Will keep chest tubes one more day because he dumped an additional 70 cc when he stood up. Continue supportive care. Encourage IS and ambulation.       DAVID Martinez  12/04/23  11:27 EST

## 2023-12-04 NOTE — THERAPY EVALUATION
Patient Name: John Paul Tineo  : 1965    MRN: [id number]                              Today's Date: 2023       Admit Date: 2023    Visit Dx:     ICD-10-CM ICD-9-CM   1. Coronary artery disease of native artery of native heart with stable angina pectoris  I25.118 414.01     413.9   2. Angina at rest  I20.89 413.9   3. Abnormal nuclear stress test  R94.39 794.39     Patient Active Problem List   Diagnosis    Angina at rest    Abnormal nuclear stress test    CAD (coronary artery disease)    Tobacco abuse    Dyslipidemia     Past Medical History:   Diagnosis Date    Chest pain     Hyperlipidemia     Shortness of breath      Past Surgical History:   Procedure Laterality Date    CARDIAC CATHETERIZATION N/A 2023    Procedure: Left Heart Cath with angiogram;  Surgeon: Fred Lopez MD;  Location: [facility] CATH INVASIVE LOCATION;  Service: Cardiovascular;  Laterality: N/A;    CARDIAC CATHETERIZATION N/A 2023    Procedure: Left ventriculography;  Surgeon: Fred Lopez MD;  Location: [facility] CATH INVASIVE LOCATION;  Service: Cardiovascular;  Laterality: N/A;    COLONOSCOPY      KNEE ARTHROSCOPY Left 2020    Procedure: KNEE ARTHROSCOPY with partial and lateral  MENISCECTOMY AND CHONROPLASTY;  Surgeon: Yovany Jacobs II, MD;  Location: [facility] MAIN OR;  Service: Orthopedics;  Laterality: Left;      General Information       Row Name 23 1514          OT Time and Intention    Document Type evaluation  -LS     Mode of Treatment occupational therapy  -LS       Row Name 23 1514          General Information    Patient Profile Reviewed yes  -LS     Existing Precautions/Restrictions cardiac;fall;sternal  -LS     Barriers to Rehab medically complex  -LS       Row Name 23 1514          Living Environment    People in Home alone  -LS       Row Name 23 1514          Home Main Entrance    Number of Stairs, Main Entrance one  -LS       Row Name 23 1514          Stairs  Within Home, Primary    Number of Stairs, Within Home, Primary none  -       Row Name 12/04/23 1514          Cognition    Orientation Status (Cognition) oriented x 4  -       Row Name 12/04/23 1514          Safety Issues, Functional Mobility    Safety Issues Affecting Function (Mobility) safety precaution awareness;insight into deficits/self-awareness  -     Impairments Affecting Function (Mobility) endurance/activity tolerance;shortness of breath;pain;strength  -     Comment, Safety Issues/Impairments (Mobility) Education + cues to maintain sternal precautions  -               User Key  (r) = Recorded By, (t) = Taken By, (c) = Cosigned By      Initials Name Provider Type     Eddie Wilkins OT Occupational Therapist                     Mobility/ADL's       Row Name 12/04/23 1514          Bed Mobility    Comment, (Bed Mobility) In chair on arrival  -       Row Name 12/04/23 1514          Transfers    Transfers sit-stand transfer  -       Row Name 12/04/23 1514          Sit-Stand Transfer    Sit-Stand [place] (Transfers) contact guard;1 person assist;1 person to manage equipment  -       Row Name 12/04/23 1514          Functional Mobility    Functional Mobility- Ind. Level contact guard assist  -     Functional Mobility- Device walker, front-wheeled  -     Patient was able to Ambulate yes  -       Row Name 12/04/23 1514          Activities of Daily Living    BADL Assessment/Intervention upper body dressing;lower body dressing  -       Row Name 12/04/23 1514          Lower Body Dressing Assessment/Training    [place] Level (Lower Body Dressing) lower body dressing skills;maximum assist (25% patient effort)  -       Row Name 12/04/23 1514          Upper Body Dressing Assessment/Training    [place] Level (Upper Body Dressing) upper body dressing skills;maximum assist (25% patient effort)  -               User Key  (r) = Recorded By, (t) = Taken By, (c) = Cosigned By       Initials Name Provider Type     Eddie Wilkins OT Occupational Therapist                   Obj/Interventions       Row Name 12/04/23 1515          Sensory Assessment (Somatosensory)    Sensory Assessment (Somatosensory) sensation intact  -       Row Name 12/04/23 1515          Range of Motion Comprehensive    General Range of Motion no range of motion deficits identified  -       Row Name 12/04/23 1515          Strength Comprehensive (MMT)    General Manual Muscle Testing (MMT) Assessment no strength deficits identified  -       Row Name 12/04/23 1515          Shoulder (Therapeutic Exercise)    Shoulder (Therapeutic Exercise) AROM (active range of motion)  -     Shoulder AROM (Therapeutic Exercise) bilateral;flexion;extension  -       Row Name 12/04/23 1515          Elbow/Forearm (Therapeutic Exercise)    Elbow/Forearm (Therapeutic Exercise) AROM (active range of motion)  -LS     Elbow/Forearm AROM (Therapeutic Exercise) bilateral;extension;flexion  -       Row Name 12/04/23 1515          Motor Skills    Therapeutic Exercise shoulder;elbow/forearm  -       Row Name 12/04/23 1515          Balance    Balance Assessment sitting static balance;sitting dynamic balance;standing static balance;standing dynamic balance  -LS     Static Sitting Balance independent  -LS     Dynamic Sitting Balance independent  -LS     Position, Sitting Balance sitting in chair  -LS     Static Standing Balance standby assist  -LS     Dynamic Standing Balance contact guard  -LS     Position/Device Used, Standing Balance walker, front-wheeled  -               User Key  (r) = Recorded By, (t) = Taken By, (c) = Cosigned By      Initials Name Provider Type    LS Eddie Wilkins OT Occupational Therapist                   Goals/Plan       Row Name 12/04/23 1519          Bed Mobility Goal 1 (OT)    Activity/Assistive Device (Bed Mobility Goal 1, OT) bed mobility activities, all  -LS     [place] Level/Cues Needed (Bed Mobility  Goal 1, OT) supervision required  -LS     Time Frame (Bed Mobility Goal 1, OT) long term goal (LTG);10 days  -LS       Row Name 12/04/23 1519          Transfer Goal 1 (OT)    Activity/Assistive Device (Transfer Goal 1, OT) sit-to-stand/stand-to-sit;bed-to-chair/chair-to-bed;commode  -LS     [place] Level/Cues Needed (Transfer Goal 1, OT) supervision required  -LS     Time Frame (Transfer Goal 1, OT) long term goal (LTG);10 days  -LS       Row Name 12/04/23 1519          Dressing Goal 1 (OT)    Activity/Device (Dressing Goal 1, OT) dressing skills, all  -LS     [place]/Cues Needed (Dressing Goal 1, OT) modified independence  -LS     Time Frame (Dressing Goal 1, OT) long term goal (LTG);10 days  -       Row Name 12/04/23 1519          Toileting Goal 1 (OT)    Activity/Device (Toileting Goal 1, OT) adjust/manage clothing;perform perineal hygiene  -LS     [place] Level/Cues Needed (Toileting Goal 1, OT) modified independence  -LS     Time Frame (Toileting Goal 1, OT) long term goal (LTG);10 days  -       Row Name 12/04/23 1519          Therapy Assessment/Plan (OT)    Planned Therapy Interventions (OT) activity tolerance training;transfer/mobility retraining;patient/caregiver education/training;IADL retraining;occupation/activity based interventions;functional balance retraining;ROM/therapeutic exercise;strengthening exercise;neuromuscular control/coordination retraining  -               User Key  (r) = Recorded By, (t) = Taken By, (c) = Cosigned By      Initials Name Provider Type    LS Eddie Wilkins OT Occupational Therapist                   Clinical Impression       Row Name 12/04/23 1516          Pain Assessment    Pretreatment Pain Rating 6/10  -LS     Posttreatment Pain Rating 6/10  -LS     Pain Location incisional  -LS     Pain Location - chest  -LS     Pain Intervention(s) Repositioned;Ambulation/increased activity;Emotional support;Nursing Notified  -       Row Name 12/04/23 1516           Plan of Care Review    Plan of Care Reviewed With patient  -     Outcome Evaluation 57 y/o gentleman presented today for elective heart cath following abnormal stress test in late November.  He had lung CT screening for lung cancer and had notable calcified coronary arteries. Dr. Salmon was asked to evaluate pt for surgical revascularization. After consult, pt elected for CABGx3 completed 12/3/23. Procedure was tolerated without complications. Pt A and O x 4. Vitals were stable with change of position. At baseline, pt lives alone and lives in [facility] with 1 DARLENE and is independent with community mobility without AD. Pt works FT as a . Pt was educated on sternal precautions this date but will require further ed and reinforcement. CGA +1 required for funcitonal mobility with use of RW, however pt requires Max A for dressing upper and lower body. Pt expected to recovery very well, OT will follow. Recommend home with assist at dc.  -       Row Name 12/04/23 1516          Therapy Assessment/Plan (OT)    Criteria for Skilled Therapeutic Interventions Met (OT) yes;skilled treatment is necessary  -     Therapy Frequency (OT) 5 times/wk  -     Predicted Duration of Therapy Intervention (OT) until dc  -       Row Name 12/04/23 1516          Vital Signs    Pre Systolic BP Rehab 120  -LS     Pre Treatment Diastolic BP 68  -LS     Intra Systolic BP Rehab 126  -LS     Intra Treatment Diastolic BP 76  -LS     Post Systolic BP Rehab 133  -LS     Post Treatment Diastolic BP 79  -LS     Pretreatment Heart Rate (beats/min) 75  -LS     Intratreatment Heart Rate (beats/min) 85  -LS     Posttreatment Heart Rate (beats/min) 76  -LS     Pre SpO2 (%) 94  2L  -LS     O2 Delivery Pre Treatment nasal cannula  -LS     Intra SpO2 (%) 94  -LS     O2 Delivery Intra Treatment nasal cannula  -LS     Post SpO2 (%) 94  -LS     O2 Delivery Post Treatment nasal cannula  -LS     Pre Patient Position Sitting  -LS     Intra Patient  Position Standing  -LS     Post Patient Position Sitting  -LS       Row Name 12/04/23 1516          Positioning and Restraints    Pre-Treatment Position sitting in chair/recliner  -LS     Post Treatment Position chair  -LS     In Chair notified nsg;reclined;call light within reach;encouraged to call for assist;exit alarm on  -LS               User Key  (r) = Recorded By, (t) = Taken By, (c) = Cosigned By      Initials Name Provider Type    Eddie Sawant, OT Occupational Therapist                   Outcome Measures       Row Name 12/04/23 1520          How much help from another is currently needed...    Putting on and taking off regular lower body clothing? 2  -LS     Bathing (including washing, rinsing, and drying) 2  -LS     Toileting (which includes using toilet bed pan or urinal) 2  -LS     Putting on and taking off regular upper body clothing 2  -LS     Taking care of personal grooming (such as brushing teeth) 4  -LS     Eating meals 4  -LS     AM-PAC 6 Clicks Score (OT) 16  -LS       Row Name 12/04/23 1425          How much help from another person do you currently need...    Turning from your back to your side while in flat bed without using bedrails? 3  -BR     Moving from lying on back to sitting on the side of a flat bed without bedrails? 3  -BR     Moving to and from a bed to a chair (including a wheelchair)? 3  -BR     Standing up from a chair using your arms (e.g., wheelchair, bedside chair)? 3  -BR     Climbing 3-5 steps with a railing? 3  -BR     To walk in hospital room? 3  -BR     AM-PAC 6 Clicks Score (PT) 18  -BR     Highest Level of Mobility Goal 6 --> Walk 10 steps or more  -BR       Row Name 12/04/23 1520 12/04/23 1425       Functional Assessment    Outcome Measure Options AM-PAC 6 Clicks Daily Activity (OT)  -LS AM-PAC 6 Clicks Basic Mobility (PT)  -BR              User Key  (r) = Recorded By, (t) = Taken By, (c) = Cosigned By      Initials Name Provider Type    Consuelo Boyd, PT  Physical Therapist    Eddie Sawant OT Occupational Therapist                    Occupational Therapy Education       Title: PT OT SLP Therapies (Done)       Topic: Occupational Therapy (Done)       Point: ADL training (Done)       Description:   Instruct learner(s) on proper safety adaptation and remediation techniques during self care or transfers.   Instruct in proper use of assistive devices.                  Learning Progress Summary             Patient Acceptance, E,TB, VU by MANUEL at 12/4/2023 1520                         Point: Home exercise program (Done)       Description:   Instruct learner(s) on appropriate technique for monitoring, assisting and/or progressing therapeutic exercises/activities.                  Learning Progress Summary             Patient Acceptance, E,TB, VU by MANUEL at 12/4/2023 1520                         Point: Precautions (Done)       Description:   Instruct learner(s) on prescribed precautions during self-care and functional transfers.                  Learning Progress Summary             Patient Acceptance, E,TB, VU by MANUEL at 12/4/2023 1520                         Point: Body mechanics (Done)       Description:   Instruct learner(s) on proper positioning and spine alignment during self-care, functional mobility activities and/or exercises.                  Learning Progress Summary             Patient Acceptance, E,TB, VU by  at 12/4/2023 1520                                         User Key       Initials Effective Dates Name Provider Type Discipline    MANUEL 09/22/22 -  Eddie Wilkins OT Occupational Therapist OT                  OT Recommendation and Plan  Planned Therapy Interventions (OT): activity tolerance training, transfer/mobility retraining, patient/caregiver education/training, IADL retraining, occupation/activity based interventions, functional balance retraining, ROM/therapeutic exercise, strengthening exercise, neuromuscular control/coordination retraining  Therapy  Frequency (OT): 5 times/wk  Plan of Care Review  Plan of Care Reviewed With: patient  Outcome Evaluation: 57 y/o gentleman presented today for elective heart cath following abnormal stress test in late November.  He had lung CT screening for lung cancer and had notable calcified coronary arteries. Dr. Salmon was asked to evaluate pt for surgical revascularization. After consult, pt elected for CABGx3 completed 12/3/23. Procedure was tolerated without complications. Pt A and O x 4. Vitals were stable with change of position. At baseline, pt lives alone and lives in [facility] with 1 DARLENE and is independent with community mobility without AD. Pt works FT as a . Pt was educated on sternal precautions this date but will require further ed and reinforcement. CGA +1 required for funcitonal mobility with use of RW, however pt requires Max A for dressing upper and lower body. Pt expected to recovery very well, OT will follow. Recommend home with assist at dc.     Time Calculation:         Time Calculation- OT       Row Name 12/04/23 1521             Time Calculation- OT    OT Start Time 0945  -      OT Stop Time 1006  -      OT Time Calculation (min) 21 min  -LS      OT Received On 12/04/23  -      OT - Next Appointment 12/05/23  -      OT Goal Re-Cert Due Date 12/18/23  -LS         Untimed Charges    OT Eval/Re-eval Minutes 21  -LS         Total Minutes    Untimed Charges Total Minutes 21  -LS       Total Minutes 21  -LS                User Key  (r) = Recorded By, (t) = Taken By, (c) = Cosigned By      Initials Name Provider Type     Eddie Wilkins OT Occupational Therapist                  Therapy Charges for Today       Code Description Service Date Service Provider Modifiers Qty    [id number] HC OT EVAL HIGH COMPLEXITY 4 12/4/2023 Eddie Wilkins OT GO 1                 Eddie Wilkins OT  12/4/2023

## 2023-12-04 NOTE — PLAN OF CARE
Goal Outcome Evaluation:  Plan of Care Reviewed With: patient   Pt presents as a 57 y/o M who had elective heart cath following abnormal stress test in late November.  He had lung CT screening for lung cancer and had notable calcified coronary arteries. Dr. Salmon was asked to evaluate pt for surgical revascularization. After consult, pt elected for CABGx3 completed 12/3/23. Procedure was tolerated without complications. Pt A and O x 4. Vitals were stable with change of position. At baseline, pt lives alone and lives in [facility] with 1 DARLENE and is independent with community mobility without AD. Pt works FT as a . Pt was educated by PT on sternal precautions. This date, pt requiring CGA of 1 plus one to manage equipment for transfers and 100 feet of gait tolerance with 2 standing rest breaks. Pt expects to have assist from his mother at home as needed. Pt is expected to progress well with stages of CABG recovery. PT will follow pt with PT recommendation of Home with Assist.                Anticipated Discharge Disposition (PT): home with assist

## 2023-12-04 NOTE — PLAN OF CARE
Goal Outcome Evaluation:  Plan of Care Reviewed With: patient           Outcome Evaluation: 57 y/o gentleman presented today for elective heart cath following abnormal stress test in late November.  He had lung CT screening for lung cancer and had notable calcified coronary arteries. Dr. Salmon was asked to evaluate pt for surgical revascularization. After consult, pt elected for CABGx3 completed 12/3/23. Procedure was tolerated without complications. Pt A and O x 4. Vitals were stable with change of position. At baseline, pt lives alone and lives in [facility] with 1 DARLENE and is independent with community mobility without AD. Pt works FT as a . Pt was educated on sternal precautions this date but will require further ed and reinforcement. CGA +1 required for funcitonal mobility with use of RW, however pt requires Max A for dressing upper and lower body. Pt expected to recovery very well, OT will follow. Recommend home with assist at dc.

## 2023-12-04 NOTE — CASE MANAGEMENT/SOCIAL WORK
Discharge Planning Assessment   [place]     Patient Name: John Paul Tineo  MRN: [id number]  Today's Date: 12/4/2023    Admit Date: 12/1/2023    Plan: Plan to discharge home with mother to assist. CM following for possible dc needs -pending PT/OT eval.   Discharge Needs Assessment       Row Name 12/04/23 1231       Living Environment    People in Home alone    Current Living Arrangements home    Potentially Unsafe Housing Conditions none    Primary Care Provided by self    Provides Primary Care For no one    Family Caregiver if Needed parent(s)    Family Caregiver Names Nikole - mother    Quality of Family Relationships helpful;involved;supportive    Able to Return to Prior Arrangements yes       Resource/Environmental Concerns    Resource/Environmental Concerns none    Transportation Concerns none       Transition Planning    Patient/Family Anticipates Transition to home with family    Patient/Family Anticipated Services at Transition none    Transportation Anticipated family or friend will provide       Discharge Needs Assessment    Readmission Within the Last 30 Days no previous admission in last 30 days    Equipment Currently Used at Home none    Concerns to be Addressed discharge planning    Anticipated Changes Related to Illness none    Equipment Needed After Discharge none                   Discharge Plan       Row Name 12/04/23 1232       Plan    Plan Plan to discharge home with mother to assist. CM following for possible dc needs -pending PT/OT eval.    Patient/Family in Agreement with Plan yes    Plan Comments Patient lives at home alone. Patient's motherNikole will transport at discharge. Patient performs IADLs. PCP and pharmacy confirmed. Agreeable to M2B.  Denies financial assistance needs for medication and/or food. Denies HH and/or rehab needs. PT/OT eval pending. DC Barriers: POD #1, PA catheter/Central line, chest tube x3, pacer wires, 4L O2, IV Abxs, IV Lasix                   Expected Discharge  Date Expected Discharge Time    Dec 8, 2023            Demographic Summary       Row Name 12/04/23 1228       General Information    Admission Type same day;inpatient    Arrived From PACU/recovery room    Required Notices Provided Important Message from Medicare    Referral Source admission list    Reason for Consult discharge planning    Preferred Language English                   Functional Status       Row Name 12/04/23 1230       Functional Status    Usual Activity Tolerance excellent    Current Activity Tolerance moderate       Functional Status, IADL    Medications independent    Meal Preparation independent    Housekeeping independent    Laundry independent    Shopping independent             ADITI Roche RN  SIPS/ICU   O: [phone]  C: [phone]  [email or web address]

## 2023-12-04 NOTE — THERAPY EVALUATION
Patient Name: John Paul Tineo  : 1965    MRN: [id number]                              Today's Date: 2023       Admit Date: 2023    Visit Dx:     ICD-10-CM ICD-9-CM   1. Coronary artery disease of native artery of native heart with stable angina pectoris  I25.118 414.01     413.9   2. Angina at rest  I20.89 413.9   3. Abnormal nuclear stress test  R94.39 794.39     Patient Active Problem List   Diagnosis    Angina at rest    Abnormal nuclear stress test    CAD (coronary artery disease)    Tobacco abuse    Dyslipidemia     Past Medical History:   Diagnosis Date    Chest pain     Hyperlipidemia     Shortness of breath      Past Surgical History:   Procedure Laterality Date    CARDIAC CATHETERIZATION N/A 2023    Procedure: Left Heart Cath with angiogram;  Surgeon: Fred Lopez MD;  Location: [facility] CATH INVASIVE LOCATION;  Service: Cardiovascular;  Laterality: N/A;    CARDIAC CATHETERIZATION N/A 2023    Procedure: Left ventriculography;  Surgeon: Fred Lopez MD;  Location: [facility] CATH INVASIVE LOCATION;  Service: Cardiovascular;  Laterality: N/A;    COLONOSCOPY      KNEE ARTHROSCOPY Left 2020    Procedure: KNEE ARTHROSCOPY with partial and lateral  MENISCECTOMY AND CHONROPLASTY;  Surgeon: Yovany Jacobs II, MD;  Location: [facility] MAIN OR;  Service: Orthopedics;  Laterality: Left;      General Information       Row Name 23 1357          Physical Therapy Time and Intention    Document Type evaluation  -BR     Mode of Treatment physical therapy  -BR       Row Name 23 1352          General Information    Patient Profile Reviewed yes  -BR     Prior Level of Function independent:;all household mobility;community mobility;gait;transfer;driving;work  Pt works FT as a .  -BR       Row Name 23 1410          Home Main Entrance    Number of Stairs, Main Entrance one  -BR       Row Name 23 1410          Stairs Within Home, Primary    Number of Stairs,  Within Home, Primary none  -BR       Row Name 12/04/23 1410          Cognition    Orientation Status (Cognition) oriented x 4  -BR       Row Name 12/04/23 1410          Safety Issues, Functional Mobility    Impairments Affecting Function (Mobility) endurance/activity tolerance;shortness of breath;pain;strength  -BR               User Key  (r) = Recorded By, (t) = Taken By, (c) = Cosigned By      Initials Name Provider Type    Consuelo Boyd PT Physical Therapist                   Mobility       Row Name 12/04/23 1410          Bed Mobility    Comment, (Bed Mobility) Pt was up in recliner.  -BR       Row Name 12/04/23 1410          Sit-Stand Transfer    Sit-Stand [place] (Transfers) contact guard;1 person assist;1 person to manage equipment  -BR       Row Name 12/04/23 1410          Gait/Stairs (Locomotion)    [place] Level (Gait) contact guard;1 person assist;1 person to manage equipment  -BR     Assistive Device (Gait) walker, front-wheeled  -BR     Distance in Feet (Gait) 100 with 2 standing rest breaks  -BR     Deviations/Abnormal Patterns (Gait) patricia decreased;base of support, wide  -BR     Bilateral Gait Deviations heel strike decreased  -BR               User Key  (r) = Recorded By, (t) = Taken By, (c) = Cosigned By      Initials Name Provider Type    Consuelo Boyd PT Physical Therapist                   Obj/Interventions       Row Name 12/04/23 1411          Range of Motion Comprehensive    General Range of Motion no range of motion deficits identified  -BR       Row Name 12/04/23 1411          Strength Comprehensive (MMT)    General Manual Muscle Testing (MMT) Assessment no strength deficits identified  -BR       Row Name 12/04/23 1411          Balance    Balance Assessment sitting static balance;sitting dynamic balance;standing static balance;standing dynamic balance  -BR     Static Sitting Balance modified independence  -BR     Dynamic Sitting Balance modified independence  -BR      Position, Sitting Balance sitting in chair  -BR     Static Standing Balance contact guard  -BR     Dynamic Standing Balance contact guard  -BR     Position/Device Used, Standing Balance walker, rolling  -BR       Row Name 12/04/23 1411          Sensory Assessment (Somatosensory)    Sensory Assessment (Somatosensory) sensation intact  -BR               User Key  (r) = Recorded By, (t) = Taken By, (c) = Cosigned By      Initials Name Provider Type    BR Consuelo Price, PT Physical Therapist                   Goals/Plan       Row Name 12/04/23 1424          Bed Mobility Goal 1 (PT)    Activity/Assistive Device (Bed Mobility Goal 1, PT) bed mobility activities, all  -BR     [place] Level/Cues Needed (Bed Mobility Goal 1, PT) modified independence  -BR     Time Frame (Bed Mobility Goal 1, PT) long term goal (LTG);2 weeks  -BR       Row Name 12/04/23 1424          Transfer Goal 1 (PT)    Activity/Assistive Device (Transfer Goal 1, PT) transfers, all  -BR     [place] Level/Cues Needed (Transfer Goal 1, PT) modified independence  -BR     Time Frame (Transfer Goal 1, PT) long term goal (LTG);2 weeks  -BR       Row Name 12/04/23 1424          Gait Training Goal 1 (PT)    Activity/Assistive Device (Gait Training Goal 1, PT) gait (walking locomotion)  -BR     [place] Level (Gait Training Goal 1, PT) standby assist  -BR     Distance (Gait Training Goal 1, PT) 350  -BR     Time Frame (Gait Training Goal 1, PT) long term goal (LTG);2 weeks  -BR       Row Name 12/04/23 1424          Stairs Goal 1 (PT)    Activity/Assistive Device (Stairs Goal 1, PT) stairs, all skills  -BR     [place] Level/Cues Needed (Stairs Goal 1, PT) contact guard required  -BR     Time Frame (Stairs Goal 1, PT) long term goal (LTG);2 weeks  -BR       Row Name 12/04/23 1424          Therapy Assessment/Plan (PT)    Planned Therapy Interventions (PT) balance training;bed mobility training;gait training;neuromuscular  re-education;transfer training;ROM (range of motion);stair training;strengthening;patient/family education;home exercise program;stretching  -BR               User Key  (r) = Recorded By, (t) = Taken By, (c) = Cosigned By      Initials Name Provider Type    BR Consuelo Price, PT Physical Therapist                   Clinical Impression       Row Name 12/04/23 1412          Pain    Pretreatment Pain Rating 6/10  -BR     Posttreatment Pain Rating 7/10  -BR     Pain Location incisional  -BR     Pain Location - chest  -BR       Row Name 12/04/23 1412          Plan of Care Review    Plan of Care Reviewed With patient  -BR     Outcome Evaluation Pt presents as a 59 y/o M who had elective heart cath following abnormal stress test in late November.  He had lung CT screening for lung cancer and had notable calcified coronary arteries. Dr. Salmon was asked to evaluate pt for surgical revascularization. After consult, pt elected for CABGx3 completed 12/3/23. Procedure was tolerated without complications. Pt A and O x 4. Vitals were stable with change of position. At baseline, pt lives alone and lives in [facility] with 1 DARLENE and is independent with community mobility without AD. Pt works FT as a . Pt was educated by PT on sternal precautions. This date, pt requiring CGA of 1 plus one to manage equipment for transfers and 100 feet of gait tolerance with 2 standing rest breaks. Pt expects to have assist from his mother at home as needed. Pt is expected to progress well with stages of CABG recovery. PT will follow pt with PT recommendation of Home with Assist.  -BR       Row Name 12/04/23 1412          Therapy Assessment/Plan (PT)    Rehab Potential (PT) good, to achieve stated therapy goals  -BR     Criteria for Skilled Interventions Met (PT) yes;meets criteria;skilled treatment is necessary  -BR     Therapy Frequency (PT) 5 times/wk  -BR     Predicted Duration of Therapy Intervention (PT) until D/C  -BR       Row  Name 12/04/23 1412          Vital Signs    Pre Systolic BP Rehab 120  -BR     Pre Treatment Diastolic BP 68  -BR     Intra Systolic BP Rehab 126  -BR     Intra Treatment Diastolic BP 76  -BR     Post Systolic BP Rehab 133  -BR     Post Treatment Diastolic BP 79  -BR     Pretreatment Heart Rate (beats/min) 75  -BR     Intratreatment Heart Rate (beats/min) 85  -BR     Posttreatment Heart Rate (beats/min) 76  -BR     Pre SpO2 (%) 94  -BR     O2 Delivery Pre Treatment nasal cannula  2L  -BR     Intra SpO2 (%) 94  -BR     O2 Delivery Intra Treatment nasal cannula  2 L  -BR     Post SpO2 (%) 94  -BR     O2 Delivery Post Treatment nasal cannula  2 L  -BR     Pre Patient Position Sitting  -BR     Intra Patient Position Standing  -BR     Post Patient Position Sitting  -BR       Row Name 12/04/23 1412          Positioning and Restraints    Pre-Treatment Position sitting in chair/recliner  -BR     Post Treatment Position chair  -BR     In Chair notified nsg;reclined;call light within reach;encouraged to call for assist;RUE elevated;LUE elevated;legs elevated  -BR               User Key  (r) = Recorded By, (t) = Taken By, (c) = Cosigned By      Initials Name Provider Type    BR Consuelo Price, PT Physical Therapist                   Outcome Measures       Row Name 12/04/23 3841          How much help from another person do you currently need...    Turning from your back to your side while in flat bed without using bedrails? 3  -BR     Moving from lying on back to sitting on the side of a flat bed without bedrails? 3  -BR     Moving to and from a bed to a chair (including a wheelchair)? 3  -BR     Standing up from a chair using your arms (e.g., wheelchair, bedside chair)? 3  -BR     Climbing 3-5 steps with a railing? 3  -BR     To walk in hospital room? 3  -BR     AM-PAC 6 Clicks Score (PT) 18  -BR     Highest Level of Mobility Goal 6 --> Walk 10 steps or more  -BR       Row Name 12/04/23 6550          Functional Assessment     Outcome Measure Options AM-PAC 6 Clicks Basic Mobility (PT)  -BR               User Key  (r) = Recorded By, (t) = Taken By, (c) = Cosigned By      Initials Name Provider Type    BR Consuelo Price PT Physical Therapist                                 Physical Therapy Education       Title: PT OT SLP Therapies (Done)       Topic: Physical Therapy (Done)       Point: Mobility training (Done)       Learning Progress Summary             Patient Acceptance, E,D,H, VU,DU,NR by BR at 12/4/2023 1426                         Point: Home exercise program (Done)       Learning Progress Summary             Patient Acceptance, E,D,H, VU,DU,NR by BR at 12/4/2023 1426                         Point: Body mechanics (Done)       Learning Progress Summary             Patient Acceptance, E,D,H, VU,DU,NR by BR at 12/4/2023 1426                         Point: Precautions (Done)       Learning Progress Summary             Patient Acceptance, E,D,H, VU,DU,NR by BR at 12/4/2023 1426                                         User Key       Initials Effective Dates Name Provider Type Discipline     02/01/22 -  Consuelo Price PT Physical Therapist PT                  PT Recommendation and Plan  Planned Therapy Interventions (PT): balance training, bed mobility training, gait training, neuromuscular re-education, transfer training, ROM (range of motion), stair training, strengthening, patient/family education, home exercise program, stretching  Plan of Care Reviewed With: patient  Outcome Evaluation: Pt presents as a 59 y/o M who had elective heart cath following abnormal stress test in late November.  He had lung CT screening for lung cancer and had notable calcified coronary arteries. Dr. Salmon was asked to evaluate pt for surgical revascularization. After consult, pt elected for CABGx3 completed 12/3/23. Procedure was tolerated without complications. Pt A and O x 4. Vitals were stable with change of position. At baseline, pt  lives alone and lives in [facility] with 1 DARLENE and is independent with community mobility without AD. Pt works FT as a . Pt was educated by PT on sternal precautions. This date, pt requiring CGA of 1 plus one to manage equipment for transfers and 100 feet of gait tolerance with 2 standing rest breaks. Pt expects to have assist from his mother at home as needed. Pt is expected to progress well with stages of CABG recovery. PT will follow pt with PT recommendation of Home with Assist.     Time Calculation:         PT Charges       Row Name 12/04/23 1427             Time Calculation    Start Time 0947  -BR      Stop Time 1010  -BR      Time Calculation (min) 23 min  -BR      PT Received On 12/04/23  -BR      PT - Next Appointment 12/05/23  -BR      PT Goal Re-Cert Due Date 12/18/23  -BR         Time Calculation- PT    Total Timed Code Minutes- PT 0 minute(s)  -BR                User Key  (r) = Recorded By, (t) = Taken By, (c) = Cosigned By      Initials Name Provider Type    BR Consuelo Price PT Physical Therapist                  Therapy Charges for Today       Code Description Service Date Service Provider Modifiers Qty    [id number] HC PT EVAL MOD COMPLEXITY 4 12/4/2023 Consuelo Price, PT GP 1            PT G-Codes  Outcome Measure Options: AM-PAC 6 Clicks Basic Mobility (PT)  AM-PAC 6 Clicks Score (PT): 18  PT Discharge Summary  Anticipated Discharge Disposition (PT): home with assist    Consuelo Price PT  12/4/2023

## 2023-12-04 NOTE — PAYOR COMM NOTE
"  John Paul Tineo (58 y.o. Male)       Date of Birth   1965    Social Security Number       Address   [address] [place]    Home Phone   [phone]    MRN   [id number]       Faith   None    Marital Status                               Admission Date   12/1/23    Admission Type   Elective    Admitting Provider   Fred Lopez MD    Attending Provider   Fred Lopez MD    Department, Room/Bed   [facility] CARDIOVASCULAR CARE UNIT, 2210/1       Discharge Date       Discharge Disposition       Discharge Destination                                 Attending Provider: Fred Lopez MD    Allergies: No Known Allergies    Isolation: Contact   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)   Code Status: CPR    Ht: 170.2 cm (67\")   Wt: 92 kg (202 lb 13.2 oz)    Admission Cmt: None   Principal Problem: CAD (coronary artery disease) [I25.10]                   Active Insurance as of 12/1/2023       Primary Coverage       Payor Plan Insurance Group Employer/Plan Group    [employer] PPO [id number]       Payor Plan Address Payor Plan Phone Number Payor Plan Fax Number Effective Dates    [address]   10/1/2023 - None Entered    [facility] [id number]         Subscriber Name Subscriber Birth Date Member ID       JOHN PAUL TINEO 1965 [id number]                     Emergency Contacts        (Rel.) Home Phone Work Phone Mobile Phone    LUPILLO TINEO (Mother) -- -- [phone]    GUSTAVO TINEO (Daughter) -- -- [phone]                 History & Physical        Fred Lopez MD at 12/01/23 0816            H&P updated. The patient was examined and the following changes are noted:            Electronically signed by Fred Lopez MD at 12/01/23 0817   Source Note        Post-procedure Diagnoses    1. Angina pectoris [I20.9]                     Subjective:   Encounter Date:11/15/2023      Patient ID: John Paul Tineo is a 58 y.o. " "male.    Chief Complaint:  History of Present Illness 58-year-old white male with history of tobacco usage hypertension hyperlipidemia presents to my office for new consultation.  Patient has been having symptoms of chest pain which he describes as a substernal discomfort without radiation but also also with shortness of breath.  No complains any PND orthopnea.  No palpitation dizziness syncope or swelling of the feet.  Patient has been taking his medicines which were recently started.  Patient also continues to smoke.  He had a CT scan of the chest as a routine part of lung cancer screening and was noted to have coronary artery calcifications.  Patient also has strong family history for coronary disease with early and premature coronary disease.  /85 Comment: recheck  Pulse 67   Ht 170.2 cm (67\")   Wt 91.6 kg (202 lb)   SpO2 98%   BMI 31.64 kg/m²     The following portions of the patient's history were reviewed and updated as appropriate: allergies, current medications, past family history, past medical history, past social history, past surgical history, and problem list.  Past Medical History:   Diagnosis Date    Hyperlipidemia      Past Surgical History:   Procedure Laterality Date    COLONOSCOPY      KNEE ARTHROSCOPY Left 8/28/2020    Procedure: KNEE ARTHROSCOPY with partial and lateral  MENISCECTOMY AND CHONROPLASTY;  Surgeon: Yovany Jacobs II, MD;  Location: [facility];  Service: Orthopedics;  Laterality: Left;     Social History     Socioeconomic History    Marital status:    Tobacco Use    Smoking status: Every Day     Packs/day: .5     Types: Cigarettes     Passive exposure: Current    Smokeless tobacco: Never   Vaping Use    Vaping Use: Never used   Substance and Sexual Activity    Alcohol use: Yes     Alcohol/week: 24.0 standard drinks of alcohol     Types: 24 Cans of beer per week    Drug use: Never    Sexual activity: Defer     Family History   Problem Relation Age of " Onset    Hyperlipidemia Mother     Hyperlipidemia Father     Heart attack Father      Current Outpatient Medications:     rosuvastatin (CRESTOR) 5 MG tablet, Take 1 tablet by mouth Daily., Disp: , Rfl:     HYDROcodone-acetaminophen (NORCO) 5-325 MG per tablet, Take 1 tablet by mouth Every 4 (Four) Hours As Needed for Severe Pain ., Disp: 30 tablet, Rfl: 0  No Known Allergies    Review of Systems   Constitutional: Negative for fever and malaise/fatigue.   HENT:  Negative for ear pain and nosebleeds.    Eyes:  Negative for blurred vision and double vision.   Cardiovascular:  Positive for chest pain. Negative for dyspnea on exertion, leg swelling and palpitations.   Respiratory:  Positive for shortness of breath. Negative for cough.    Skin:  Negative for rash.   Musculoskeletal:  Negative for joint pain.   Gastrointestinal:  Negative for abdominal pain, nausea and vomiting.   Neurological:  Negative for dizziness, focal weakness, headaches, light-headedness and numbness.   Psychiatric/Behavioral:  Negative for depression. The patient is not nervous/anxious.    All other systems reviewed and are negative.             Objective:     Constitutional:       Appearance: Well-developed.   Eyes:      General: No scleral icterus.     Conjunctiva/sclera: Conjunctivae normal.      Pupils: Pupils are equal, round, and reactive to light.   HENT:      Head: Normocephalic and atraumatic.   Neck:      Vascular: No carotid bruit or JVD.   Pulmonary:      Effort: Pulmonary effort is normal.      Breath sounds: Normal breath sounds. No wheezing. No rales.   Cardiovascular:      Normal rate. Regular rhythm.   Pulses:     Intact distal pulses.   Abdominal:      General: Bowel sounds are normal.      Palpations: Abdomen is soft.   Musculoskeletal: Normal range of motion.      Cervical back: Normal range of motion and neck supple. Skin:     General: Skin is warm and dry.      Findings: No rash.   Neurological:      Mental Status: Alert.       Comments: No focal deficits           ECG 12 Lead    Date/Time: 11/15/2023 1:23 PM  Performed by: Fred Lopez MD    Authorized by: Fred Lopez MD  Comments: Sinus rhythm  Poor R wave progression anterior leads, cannot rule out anterior tract  Abnormal EKG  No previous is available          Lab Review:       Assessment:          Diagnosis Plan   1. Angina pectoris        2. Shortness of breath        3. Primary hypertension        4. Pure hypercholesterolemia        5. Abnormal CT of the chest             Plan:    Patient present with chest pain or shortness of breath and has risk factor for coronary disease  Patient also had a CT scan of the chest which showed significant coronary artery calcifications  Patient will have a stress Myoview study to rule out ischemia  Patient blood pressure is high and needs to be started on blood pressure medicines after he checks his blood pressure at home  Patient is already on statins for his hyperlipidemia.  Further treatment based on stress test result             Electronically signed by Fred Lopez MD at 11/15/23 1326                 Fred Lopez MD at 11/15/23 1300        Post-procedure Diagnoses    1. Angina pectoris [I20.9]                     Subjective:     Encounter Date:11/15/2023      Patient ID: John Paul Tineo is a 58 y.o. male.    Chief Complaint:  History of Present Illness 58-year-old white male with history of tobacco usage hypertension hyperlipidemia presents to my office for new consultation.  Patient has been having symptoms of chest pain which he describes as a substernal discomfort without radiation but also also with shortness of breath.  No complains any PND orthopnea.  No palpitation dizziness syncope or swelling of the feet.  Patient has been taking his medicines which were recently started.  Patient also continues to smoke.  He had a CT scan of the chest as a routine part of lung cancer screening and was noted to have coronary artery  "calcifications.  Patient also has strong family history for coronary disease with early and premature coronary disease.  /85 Comment: recheck  Pulse 67   Ht 170.2 cm (67\")   Wt 91.6 kg (202 lb)   SpO2 98%   BMI 31.64 kg/m²     The following portions of the patient's history were reviewed and updated as appropriate: allergies, current medications, past family history, past medical history, past social history, past surgical history, and problem list.  Past Medical History:   Diagnosis Date    Hyperlipidemia      Past Surgical History:   Procedure Laterality Date    COLONOSCOPY      KNEE ARTHROSCOPY Left 8/28/2020    Procedure: KNEE ARTHROSCOPY with partial and lateral  MENISCECTOMY AND CHONROPLASTY;  Surgeon: Yovany Jacobs II, MD;  Location: [facility] MAIN OR;  Service: Orthopedics;  Laterality: Left;     Social History     Socioeconomic History    Marital status:    Tobacco Use    Smoking status: Every Day     Packs/day: .5     Types: Cigarettes     Passive exposure: Current    Smokeless tobacco: Never   Vaping Use    Vaping Use: Never used   Substance and Sexual Activity    Alcohol use: Yes     Alcohol/week: 24.0 standard drinks of alcohol     Types: 24 Cans of beer per week    Drug use: Never    Sexual activity: Defer     Family History   Problem Relation Age of Onset    Hyperlipidemia Mother     Hyperlipidemia Father     Heart attack Father        Current Outpatient Medications:     rosuvastatin (CRESTOR) 5 MG tablet, Take 1 tablet by mouth Daily., Disp: , Rfl:     HYDROcodone-acetaminophen (NORCO) 5-325 MG per tablet, Take 1 tablet by mouth Every 4 (Four) Hours As Needed for Severe Pain ., Disp: 30 tablet, Rfl: 0  No Known Allergies    Review of Systems   Constitutional: Negative for fever and malaise/fatigue.   HENT:  Negative for ear pain and nosebleeds.    Eyes:  Negative for blurred vision and double vision.   Cardiovascular:  Positive for chest pain. Negative for dyspnea on " exertion, leg swelling and palpitations.   Respiratory:  Positive for shortness of breath. Negative for cough.    Skin:  Negative for rash.   Musculoskeletal:  Negative for joint pain.   Gastrointestinal:  Negative for abdominal pain, nausea and vomiting.   Neurological:  Negative for dizziness, focal weakness, headaches, light-headedness and numbness.   Psychiatric/Behavioral:  Negative for depression. The patient is not nervous/anxious.    All other systems reviewed and are negative.             Objective:     Constitutional:       Appearance: Well-developed.   Eyes:      General: No scleral icterus.     Conjunctiva/sclera: Conjunctivae normal.      Pupils: Pupils are equal, round, and reactive to light.   HENT:      Head: Normocephalic and atraumatic.   Neck:      Vascular: No carotid bruit or JVD.   Pulmonary:      Effort: Pulmonary effort is normal.      Breath sounds: Normal breath sounds. No wheezing. No rales.   Cardiovascular:      Normal rate. Regular rhythm.   Pulses:     Intact distal pulses.   Abdominal:      General: Bowel sounds are normal.      Palpations: Abdomen is soft.   Musculoskeletal: Normal range of motion.      Cervical back: Normal range of motion and neck supple. Skin:     General: Skin is warm and dry.      Findings: No rash.   Neurological:      Mental Status: Alert.      Comments: No focal deficits           ECG 12 Lead    Date/Time: 11/15/2023 1:23 PM  Performed by: Fred Lopez MD    Authorized by: Fred Lopez MD  Comments: Sinus rhythm  Poor R wave progression anterior leads, cannot rule out anterior tract  Abnormal EKG  No previous is available          Lab Review:       Assessment:          Diagnosis Plan   1. Angina pectoris        2. Shortness of breath        3. Primary hypertension        4. Pure hypercholesterolemia        5. Abnormal CT of the chest               Plan:    Patient present with chest pain or shortness of breath and has risk factor for coronary  disease  Patient also had a CT scan of the chest which showed significant coronary artery calcifications  Patient will have a stress Myoview study to rule out ischemia  Patient blood pressure is high and needs to be started on blood pressure medicines after he checks his blood pressure at home  Patient is already on statins for his hyperlipidemia.  Further treatment based on stress test result             Electronically signed by Fred Lopez MD at 11/15/23 1326          Operative/Procedure Notes (last 72 hours)        Jeffery Salmon MD at 12/03/23 0735          Operative Note    Date of Dictation: 12/03/23    Date of Procedure: 12/03/23    Referring Physician: Fred Lopez MD    Preoperative diagnosis: Multivessel CAD    Postoperative diagnosis: Same    Procedure:   1. CABG x 3 (LIMA to LAD, SVG to OM, SVG to RCA)  2. EVH of the right legs    Surgeon: Jeffery Salmon MD     Assistants: KHLOE Gómez was responsible for performing the following activities: Cardiac Surgery First assist, Endoscopic Vein Manquin for CABG, surgical wound closure and their skilled assistance was necessary for the success of this case.     Anesthesia: General endotracheal anesthesia and BIGG    Findings:  The saphenous vein was harvested endoscopically form the right  leg. The vein had a diameter of 4 mm and was of good quality. The coronaries had a diameter of 2 mm and were of good quality.      Estimated Blood Loss:  500 mL of Cell Saver returned.    STS Data: The STS Risk score discussed with the patient and family.  Counseling was done regarding abuse of tobacco, alcohol and drugs as needed. They understand and wish to proceed. The antibiotics and b blockers were given in the STS required window.          Description of the procedure:     The patient was placed supine on the operative table. General anesthesia was given and lines placed. The patient was prepped and draped using the usual sterile technique. A  median sternotomy was performed with a scalpel and the layers carried down to the sternum using the electrocautery. The sternum was split in the midline using a vertical oscillating saw. Hemostasis was achieved. The LIMA was harvested skeletonized and prepared with papaverine. It was of good quality. 300 units/kg of IV heparin was given to an ACT over 400. A Favaloro retractor was placed and the mediastinum exposed, the pericardium was opened and the edges tacked to the wound. Cannulation sutures were placed in the ascending aorta and right atrium. Small cannulas were placed and aorto right atrial cardiopulmonary bypass was started. Cardioplegia cannulas were placed. Cardiopulmonary bypass was then established for 45 minutes drifting to 34°C at appropriate flow rates.  The aorta was crossclamped for 38 minutes and we gave 1000 cc of antegrade cold blood cardioplegia then 200L of retrograde cold blood cardioplegia and then repeated doses every 10 to 15 minutes to good effect. 2veins were anastomosed to the ascending aorta with 6-0 Prolene and marked with washers.  The first vein was sewn to the obtuse marginal of the circunflex artery with 7-0 Prolene.  The next vein was sewn to distal right coronary artery with 7-0 Prolene. The LIMA was sewn to the distal LAD with 7-0 Prolene. A warm dose of cardioplegia was given and then the aortic clamp removed as well as the LIMA bulldog clamp. All anastomoses were checked and had good flow and morphology. The left pleural space was suctioned and the lungs ventilated. The heart was paced till regular atrial rhythm resumed. I allowed the heart to eject and once hemodynamics were acceptable, then the CPB was discontinued and the venous and cardioplegia cannulas removed. The matching dose of protamine was given and the aortic cannula removed as well. AV temporary wires and pleural and mediastinal chest tubes were placed and the wound sprayed with platelet rich plasma. The sternum  was closed with single and double wires and soft tissue in layers of reabsorbable material. The wounds were covered with sterile dressings.       Specimen removed:  none    CPB time:  45 minutes.    Aortic clamp time:  38 minutes    Complications:  none           Disposition: Cardiovascular recovery room           Condition: Critical but stable.     Electronically signed by Jeffery Salmon MD at 12/03/23 1202          Physician Progress Notes (last 72 hours)        Fred Lopez MD at 12/03/23 1700          CARDIOLOGY PROGRESS NOTE:    John Paul Tineo  58 y.o.  male  1965  [id number]      Referring Provider: Cardiac surgeon    Reason for follow-up: Coronary artery disease     Patient Care Team:  Morris Mercado PA-C as PCP - General (Physician Assistant)  Fred Lopez MD as Consulting Physician (Cardiology)    Subjective .  Patient is intubated    Objective intubated     Review of Systems   Unable to perform ROS: Intubated       Allergies: Patient has no known allergies.    Scheduled Meds:acetaminophen, 1,000 mg, Intravenous, Q8H  [START ON 12/4/2023] aspirin, 81 mg, Oral, Daily  [START ON 12/4/2023] atorvastatin, 40 mg, Oral, Nightly  ceFAZolin, 2 g, Intravenous, Q8H  chlorhexidine, 15 mL, Mouth/Throat, Q12H  [START ON 12/4/2023] enoxaparin, 40 mg, Subcutaneous, Daily  magnesium sulfate, 1 g, Intravenous, Q8H  mupirocin, , Each Nare, BID  [START ON 12/4/2023] pantoprazole, 40 mg, Oral, Daily  polyethylene glycol, 17 g, Oral, BID  senna-docusate sodium, 2 tablet, Oral, BID  vancomycin, 15 mg/kg, Intravenous, Q12H      Continuous Infusions:dexmedetomidine, 0.2-1.5 mcg/kg/hr, Last Rate: Stopped (12/03/23 1431)  insulin, 0-100 Units/hr, Last Rate: Stopped (12/03/23 1123)  niCARdipine, 5-15 mg/hr, Last Rate: Stopped (12/03/23 1030)  nitroglycerin, 5-50 mcg/min  norepinephrine, 0.02-0.06 mcg/kg/min, Last Rate: 0.04 mcg/kg/min (12/03/23 1632)  Pharmacy to dose vancomycin,   sodium chloride, 30 mL/hr,  "Last Rate: 30 mL/hr (12/03/23 1124)      PRN Meds:.  [START ON 12/4/2023] acetaminophen **OR** [START ON 12/4/2023] acetaminophen **OR** [START ON 12/4/2023] acetaminophen    albumin human    Calcium Replacement - Follow Nurse / BPA Driven Protocol    dextrose    dextrose    glucagon (human recombinant)    HYDROcodone-acetaminophen    Magnesium Cardiology Dose Replacement - Follow Nurse / BPA Driven Protocol    meperidine    Morphine **AND** naloxone    niCARdipine    nitroglycerin    nitroglycerin    norepinephrine    ondansetron    Pharmacy to dose vancomycin    Phosphorus Replacement - Follow Nurse / BPA Driven Protocol    Potassium Replacement - Follow Nurse / BPA Driven Protocol    vasopressin        VITAL SIGNS  Vitals:    12/03/23 1523 12/03/23 1530 12/03/23 1600 12/03/23 1629   BP:   108/67    BP Location:       Patient Position:       Pulse: 90 94 90 89   Resp: 22 22 28    Temp:  98.1 °F (36.7 °C) 98.6 °F (37 °C) 98.1 °F (36.7 °C)   TempSrc:       SpO2: 95% 95% 94% 94%   Weight:       Height:           Flowsheet Rows      Flowsheet Row First Filed Value   Admission Height 170.2 cm (67\") Documented at 12/01/2023 0715   Admission Weight 90.9 kg (200 lb 6.4 oz) Documented at 12/01/2023 0715             TELEMETRY: Sinus rhythm    Physical Exam:  Constitutional:       Appearance: Well-developed.   Eyes:      General: No scleral icterus.     Conjunctiva/sclera: Conjunctivae normal.      Pupils: Pupils are equal, round, and reactive to light.   HENT:      Head: Normocephalic and atraumatic.   Neck:      Vascular: No carotid bruit or JVD.   Pulmonary:      Effort: Pulmonary effort is normal.      Breath sounds: Normal breath sounds. No wheezing. No rales.   Cardiovascular:      Normal rate. Regular rhythm.   Pulses:     Intact distal pulses.   Abdominal:      General: Bowel sounds are normal.      Palpations: Abdomen is soft.   Musculoskeletal: Normal range of motion.      Cervical back: Normal range of motion and " neck supple. Skin:     General: Skin is warm and dry.      Findings: No rash.   Neurological:      Mental Status: Alert.      Comments: No focal deficits          Results Review:   I reviewed the patient's new clinical results.  Lab Results (last 24 hours)       Procedure Component Value Units Date/Time    Blood Gas, Arterial - [[id number]]  (Abnormal) Collected: 12/03/23 1521    Specimen: Arterial Blood Updated: 12/03/23 1533     Site Arterial Line     Valentino's Test N/A     pH, Arterial 7.279 pH units      pCO2, Arterial 49.0 mm Hg      pO2, Arterial 76.4 mm Hg      HCO3, Arterial 23.0 mmol/L      Base Excess, Arterial -4.1 mmol/L      Comment: Serial Number: [id number]:  [id number]        O2 Saturation, Arterial 93.1 %      CO2 Content 24.5 mmol/L      Barometric Pressure for Blood Gas --     Comment: N/A        Modality Adult Vent     FIO2 40 %      Ventilator Mode CPAP/PS     PEEP 5     Hemodilution No    POCT Electrolytes +HGB +HCT [[id number]]  (Abnormal) Collected: 12/03/23 1521    Specimen: Arterial Blood Updated: 12/03/23 1533     Sodium 141 mmol/L      POC Potassium 4.3 mmol/L      Ionized Calcium 1.19 mmol/L      Comment: Serial Number: [id number]:  [id number]        Glucose 134 mg/dL      Hematocrit 38 %      Hemoglobin 13.1 g/dL     POC Glucose Once [[id number]]  (Abnormal) Collected: 12/03/23 1521    Specimen: Arterial Blood Updated: 12/03/23 1533     Glucose 134 mg/dL      Comment: Serial Number: [id number]:  [id number]       Blood Gas, Arterial - [[id number]]  (Abnormal) Collected: 12/03/23 1356    Specimen: Arterial Blood Updated: 12/03/23 1402     Site Arterial Line     Valentino's Test N/A     pH, Arterial 7.262 pH units      pCO2, Arterial 52.6 mm Hg      pO2, Arterial 80.2 mm Hg      HCO3, Arterial 23.7 mmol/L      Base Excess, Arterial -3.9 mmol/L      Comment: Serial Number: [id number]:  [id number]        O2 Saturation, Arterial 93.6 %      CO2 Content 25.4 mmol/L      Barometric Pressure for Blood Gas --      Comment: N/A        Modality Adult Vent     FIO2 40 %      Ventilator Mode CPAP/PS     PEEP 5     Hemodilution No    POCT Electrolytes +HGB +HCT [[id number]]  (Abnormal) Collected: 12/03/23 1356    Specimen: Arterial Blood Updated: 12/03/23 1402     Sodium 141 mmol/L      POC Potassium 4.3 mmol/L      Ionized Calcium 1.21 mmol/L      Comment: Serial Number: [id number]:  [id number]        Glucose 132 mg/dL      Hematocrit 40 %      Hemoglobin 13.5 g/dL     POC Glucose Once [[id number]]  (Abnormal) Collected: 12/03/23 1356    Specimen: Arterial Blood Updated: 12/03/23 1402     Glucose 132 mg/dL      Comment: Serial Number: [id number]:  [id number]       Blood Gas, Arterial - [[id number]]  (Abnormal) Collected: 12/03/23 1314    Specimen: Arterial Blood Updated: 12/03/23 1318     Site Arterial Line     Valentino's Test N/A     pH, Arterial 7.329 pH units      pCO2, Arterial 41.9 mm Hg      pO2, Arterial 82.5 mm Hg      HCO3, Arterial 22.0 mmol/L      Base Excess, Arterial -3.8 mmol/L      Comment: Serial Number: [id number]:  [id number]        O2 Saturation, Arterial 95.2 %      CO2 Content 23.3 mmol/L      Barometric Pressure for Blood Gas --     Comment: N/A        Modality Adult Vent     FIO2 50 %      Ventilator Mode AC     Set Tidal Volume 550     PEEP 5     Hemodilution No     Respiratory Rate 20    POCT Electrolytes +HGB +HCT [[id number]]  (Abnormal) Collected: 12/03/23 1314    Specimen: Arterial Blood Updated: 12/03/23 1318     Sodium 140 mmol/L      POC Potassium 4.3 mmol/L      Ionized Calcium 1.18 mmol/L      Comment: Serial Number: [id number]:  [id number]        Glucose 118 mg/dL      Hematocrit 39 %      Hemoglobin 13.2 g/dL     POC Glucose Once [[id number]]  (Abnormal) Collected: 12/03/23 1314    Specimen: Arterial Blood Updated: 12/03/23 1318     Glucose 118 mg/dL      Comment: Serial Number: [id number]:  [id number]       POCT Electrolytes +HGB +HCT [[id number]]  (Abnormal) Collected: 12/03/23 1307    Specimen:  Arterial Blood Updated: 12/03/23 1312     Sodium 141 mmol/L      POC Potassium 4.3 mmol/L      Ionized Calcium 1.20 mmol/L      Comment: Serial Number: [id number]:  [id number]        Glucose 119 mg/dL      Hematocrit 39 %      Hemoglobin 13.3 g/dL     POC Glucose Once [[id number]]  (Abnormal) Collected: 12/03/23 1307    Specimen: Arterial Blood Updated: 12/03/23 1312     Glucose 119 mg/dL      Comment: Serial Number: [id number]:  [id number]       Blood Gas, Arterial - [[id number]] Collected: 12/03/23 1307    Specimen: Arterial Blood Updated: 12/03/23 1312    Blood Gas, Arterial - [[id number]]  (Abnormal) Collected: 12/03/23 1224    Specimen: Arterial Blood Updated: 12/03/23 1231     Site Arterial Line     Valentino's Test N/A     pH, Arterial 7.322 pH units      pCO2, Arterial 45.3 mm Hg      pO2, Arterial 78.4 mm Hg      HCO3, Arterial 23.4 mmol/L      Base Excess, Arterial -2.8 mmol/L      Comment: Serial Number: [id number]:  [id number]        O2 Saturation, Arterial 94.3 %      CO2 Content 24.8 mmol/L      Barometric Pressure for Blood Gas --     Comment: N/A        Modality Adult Vent     FIO2 50 %      Ventilator Mode AC     Set Tidal Volume 550     PEEP 8     Hemodilution No     Respiratory Rate 18    POCT Electrolytes +HGB +HCT [[id number]]  (Abnormal) Collected: 12/03/23 1224    Specimen: Arterial Blood Updated: 12/03/23 1231     Sodium 141 mmol/L      POC Potassium 4.3 mmol/L      Ionized Calcium 1.22 mmol/L      Comment: Serial Number: [id number]:  [id number]        Glucose 118 mg/dL      Hematocrit 40 %      Hemoglobin 13.6 g/dL     POC Glucose Once [[id number]]  (Abnormal) Collected: 12/03/23 1224    Specimen: Arterial Blood Updated: 12/03/23 1231     Glucose 118 mg/dL      Comment: Serial Number: [id number]:  [id number]       Blood Gas, Arterial - [[id number]]  (Abnormal) Collected: 12/03/23 1135    Specimen: Arterial Blood Updated: 12/03/23 1141     Site Arterial Line     Valentino's Test N/A     pH, Arterial 7.303 pH  units      pCO2, Arterial 49.4 mm Hg      pO2, Arterial 97.9 mm Hg      HCO3, Arterial 24.5 mmol/L      Base Excess, Arterial -2.4 mmol/L      Comment: Serial Number: [id number]:  [id number]        O2 Saturation, Arterial 96.8 %      CO2 Content 26.0 mmol/L      Barometric Pressure for Blood Gas --     Comment: N/A        Modality Adult Vent     FIO2 70 %      Ventilator Mode AC     Set Tidal Volume 520     PEEP 8     Hemodilution No     Respiratory Rate 18    POCT Electrolytes +HGB +HCT [[id number]]  (Abnormal) Collected: 12/03/23 1135    Specimen: Arterial Blood Updated: 12/03/23 1141     Sodium 140 mmol/L      POC Potassium 4.5 mmol/L      Ionized Calcium 1.22 mmol/L      Comment: Serial Number: [id number]:  [id number]        Glucose 112 mg/dL      Hematocrit 40 %      Hemoglobin 13.6 g/dL     POC Glucose Once [[id number]]  (Abnormal) Collected: 12/03/23 1135    Specimen: Arterial Blood Updated: 12/03/23 1141     Glucose 112 mg/dL      Comment: Serial Number: [id number]:  [id number]       Blood Gas, Venous - [[id number]]  (Abnormal) Collected: 12/03/23 1104    Specimen: Venous Blood Updated: 12/03/23 1108     Site [facility]     pH, Venous 7.264 pH Units      pCO2, Venous 53.5 mm Hg      pO2, Venous 40.3 mm Hg      HCO3, Venous 24.2 mmol/L      Base Excess, Venous -3.4 mmol/L      Comment: Serial Number: [id number]:  [id number]        O2 Saturation, Venous 66.8 %      CO2 Content 25.9 mmol/L      Barometric Pressure for Blood Gas --     Comment: N/A        Modality Adult Vent     FIO2 70 %      Ventilator Mode AC     Set Tidal Volume 520     PEEP 8    POCT Electrolytes +HGB +HCT [[id number]]  (Abnormal) Collected: 12/03/23 1104    Specimen: Venous Blood Updated: 12/03/23 1108     Sodium 142 mmol/L      POC Potassium 4.0 mmol/L      Ionized Calcium 1.21 mmol/L      Comment: Serial Number: [id number]:  [id number]        Glucose 110 mg/dL      Hematocrit 40 %      Hemoglobin 13.6 g/dL     POC Glucose Once [[id number]]   (Abnormal) Collected: 12/03/23 1104    Specimen: Venous Blood Updated: 12/03/23 1108     Glucose 110 mg/dL      Comment: Serial Number: [id number]:  [id number]       Blood Gas, Arterial - [[id number]]  (Abnormal) Collected: 12/03/23 1053    Specimen: Arterial Blood Updated: 12/03/23 1057     Site Arterial Line     Valentino's Test N/A     pH, Arterial 7.270 pH units      pCO2, Arterial 56.1 mm Hg      pO2, Arterial 82.1 mm Hg      HCO3, Arterial 25.8 mmol/L      Base Excess, Arterial -2.1 mmol/L      Comment: Serial Number: [id number]:  [id number]        O2 Saturation, Arterial 94.0 %      CO2 Content 27.5 mmol/L      Barometric Pressure for Blood Gas --     Comment: N/A        Modality Adult Vent     FIO2 70 %      Ventilator Mode AC     Set Tidal Volume 520     PEEP 8     Hemodilution No     Respiratory Rate 14    POCT Electrolytes +HGB +HCT [[id number]]  (Abnormal) Collected: 12/03/23 1053    Specimen: Arterial Blood Updated: 12/03/23 1057     Sodium 140 mmol/L      POC Potassium 4.4 mmol/L      Ionized Calcium 1.28 mmol/L      Comment: Serial Number: [id number]:  [id number]        Glucose 114 mg/dL      Hematocrit 42 %      Hemoglobin 14.4 g/dL     POC Lactate [[id number]]  (Normal) Collected: 12/03/23 1053    Specimen: Arterial Blood Updated: 12/03/23 1057     Lactate 0.7 mmol/L      Comment: Serial Number: [id number]:  [id number]       POC Glucose Once [[id number]]  (Abnormal) Collected: 12/03/23 1053    Specimen: Arterial Blood Updated: 12/03/23 1057     Glucose 114 mg/dL      Comment: Serial Number: [id number]:  [id number]       Renal Function Panel [[id number]]  (Abnormal) Collected: 12/03/23 1023    Specimen: Blood Updated: 12/03/23 1055     Glucose 123 mg/dL      BUN 10 mg/dL      Creatinine 0.73 mg/dL      Sodium 138 mmol/L      Potassium 4.9 mmol/L      Comment: Slight hemolysis detected by analyzer. Result may be falsely elevated.        Chloride 103 mmol/L      CO2 26.0 mmol/L      Calcium 9.2 mg/dL      Albumin  4.1 g/dL      Phosphorus 3.8 mg/dL      Anion Gap 9.0 mmol/L      BUN/Creatinine Ratio 13.7     eGFR 106.1 mL/min/1.73     Narrative:      GFR Normal >60  Chronic Kidney Disease <60  Kidney Failure <15      Protime-INR [[id number]]  (Abnormal) Collected: 12/03/23 1023    Specimen: Blood Updated: 12/03/23 1049     Protime 11.8 Seconds      INR 1.09    aPTT [[id number]]  (Normal) Collected: 12/03/23 1023    Specimen: Blood Updated: 12/03/23 1049     PTT 28.5 seconds     Calcium, Ionized [[id number]]  (Normal) Collected: 12/03/23 1023    Specimen: Blood Updated: 12/03/23 1044     Ionized Calcium 1.28 mmol/L     CBC & Differential [[id number]]  (Abnormal) Collected: 12/03/23 1023    Specimen: Blood Updated: 12/03/23 1040    Narrative:      The following orders were created for panel order CBC & Differential.  Procedure                               Abnormality         Status                     ---------                               -----------         ------                     CBC Auto Differential[[id number]]        Abnormal            Final result                 Please view results for these tests on the individual orders.    CBC Auto Differential [[id number]]  (Abnormal) Collected: 12/03/23 1023    Specimen: Blood Updated: 12/03/23 1040     WBC 17.50 10*3/mm3      RBC 4.10 10*6/mm3      Hemoglobin 13.5 g/dL      Hematocrit 40.6 %      MCV 98.9 fL      MCH 32.9 pg      MCHC 33.3 g/dL      RDW 12.4 %      RDW-SD 45.1 fl      MPV 9.0 fL      Platelets 196 10*3/mm3      Neutrophil % 85.5 %      Lymphocyte % 10.3 %      Monocyte % 3.1 %      Eosinophil % 0.9 %      Basophil % 0.2 %      Neutrophils, Absolute 15.00 10*3/mm3      Lymphocytes, Absolute 1.80 10*3/mm3      Monocytes, Absolute 0.50 10*3/mm3      Eosinophils, Absolute 0.20 10*3/mm3      Basophils, Absolute 0.00 10*3/mm3      nRBC 0.0 /100 WBC     POC Chem 8, arterial (ISTAT) [[id number]]  (Abnormal) Collected: 12/03/23 0929    Specimen: Arterial Blood Updated:  12/03/23 1000     Ionized Calcium 1.49 mmol/L      pH, Arterial 7.290 pH units      pCO2, Arterial 54.6 mm Hg      pO2, Arterial 219.0 mm Hg      HCO3, Arterial 26.0 mmol/L      Base Excess, Arterial <0.0 mmol/L      Base Deficit --     O2 Saturation, Arterial 100.0 %      Glucose 133 mg/dL      Comment: Serial Number: [id number]:  [id number]        Sodium 135 mmol/L      POC Potassium 4.6 mmol/L      Hematocrit 36 %      Hemoglobin 12.2 g/dL      CO2 Content 28 mmol/L     POC Chem 8, arterial (ISTAT) [[id number]]  (Abnormal) Collected: 12/03/23 0737    Specimen: Arterial Blood Updated: 12/03/23 1000     Ionized Calcium 1.30 mmol/L      pH, Arterial 7.300 pH units      pCO2, Arterial 52.1 mm Hg      pO2, Arterial 286.0 mm Hg      HCO3, Arterial 25.8 mmol/L      Base Excess, Arterial <0.0 mmol/L      Base Deficit --     O2 Saturation, Arterial 100.0 %      Glucose 116 mg/dL      Comment: Serial Number: [id number]:  [id number]        Sodium 138 mmol/L      POC Potassium 4.0 mmol/L      Hematocrit 44 %      Hemoglobin 15.0 g/dL      CO2 Content 27 mmol/L     POC Chem 8, arterial (ISTAT) [[id number]]  (Abnormal) Collected: 12/03/23 0905    Specimen: Arterial Blood Updated: 12/03/23 0959     Ionized Calcium 1.10 mmol/L      pH, Arterial 7.320 pH units      pCO2, Arterial 54.3 mm Hg      pO2, Arterial 397.0 mm Hg      HCO3, Arterial 27.9 mmol/L      Base Excess, Arterial 2.0 mmol/L      Base Deficit --     O2 Saturation, Arterial 100.0 %      Glucose 141 mg/dL      Comment: Serial Number: [id number]:  [id number]        Sodium 136 mmol/L      POC Potassium 5.2 mmol/L      Hematocrit 38 %      Hemoglobin 12.9 g/dL      CO2 Content 30 mmol/L     POC Chem Panel [[id number]]  (Abnormal) Collected: 12/03/23 0848    Specimen: Venous Blood Updated: 12/03/23 0959     Glucose 128 mg/dL      Comment: Serial Number: [id number]:  [id number]        Sodium 134 mmol/L      POC Potassium 5.1 mmol/L      Ionized Calcium 1.14 mmol/L       Hematocrit 36 %      Hemoglobin 12.2 g/dL      pH, Venous 7.230 pH Units      pCO2, Venous 61.1 mm Hg      CO2 CONTENT  --     HCO3, Venous 25.4 mmol/L      Base Excess, Venous --     Base Deficit --     O2 Saturation, Venous 27.0 %      pO2, Venous 58.0 mm Hg     POC Chem 8, arterial (ISTAT) [[id number]]  (Abnormal) Collected: 12/03/23 0841    Specimen: Arterial Blood Updated: 12/03/23 0959     Ionized Calcium 1.12 mmol/L      pH, Arterial 7.250 pH units      pCO2, Arterial 55.5 mm Hg      pO2, Arterial 442.0 mm Hg      HCO3, Arterial 24.3 mmol/L      Base Excess, Arterial <0.0 mmol/L      Base Deficit --     O2 Saturation, Arterial 100.0 %      Glucose 123 mg/dL      Comment: Serial Number: [id number]:  [id number]        Sodium 134 mmol/L      POC Potassium 4.2 mmol/L      Hematocrit 47 %      Hemoglobin 16.0 g/dL      CO2 Content 26 mmol/L     POC Activated Clotting Time [[id number]]  (Abnormal) Collected: 12/03/23 0813    Specimen: Arterial Blood Updated: 12/03/23 0959     Activated Clotting Time  542 Seconds      Comment: Serial Number: [id number]:  [id number]       POC Activated Clotting Time [[id number]]  (Abnormal) Collected: 12/03/23 0928    Specimen: Arterial Blood Updated: 12/03/23 0936     Activated Clotting Time  141 Seconds      Comment: Serial Number: [id number]:  [id number]       POC Activated Clotting Time [[id number]]  (Abnormal) Collected: 12/03/23 0905    Specimen: Arterial Blood Updated: 12/03/23 0935     Activated Clotting Time  374 Seconds      Comment: Serial Number: [id number]:  [id number]       POC Activated Clotting Time [[id number]]  (Abnormal) Collected: 12/03/23 0842    Specimen: Arterial Blood Updated: 12/03/23 0935     Activated Clotting Time  417 Seconds      Comment: Serial Number: [id number]:  [id number]       POC Activated Clotting Time [[id number]]  (Abnormal) Collected: 12/03/23 0737    Specimen: Arterial Blood Updated: 12/03/23 0935     Activated Clotting Time  157 Seconds      Comment:  Serial Number: [id number]:  [id number]       POC Glucose Once [[id number]]  (Normal) Collected: 12/03/23 0600    Specimen: Blood Updated: 12/03/23 0602     Glucose 90 mg/dL      Comment: Serial Number: [id number]:  [id number]               Imaging Results (Last 24 Hours)       Procedure Component Value Units Date/Time    XR Chest 1 View [[id number]] Collected: 12/03/23 1210     Updated: 12/03/23 1213    Narrative:      XR CHEST 1 VW    Date of Exam: 12/3/2023 10:47 AM EST    Indication: Post-Op Check Line & Tube Placement    Comparison: 12/1/2023    Findings:  ET tube is above the tiffany. Status post sternotomy. Esophagogastric tube tip overlies the body of the stomach. Right IJ [place]-Moy catheter tip overlies the main pulmonary outflow. Mediastinal drainage tube and left-sided chest tube noted. Negative for   pneumothorax. Mild bibasilar atelectasis left greater than right. No significant effusion.      Impression:      Impression:  1. Postoperative changes of sternotomy with lines and support tubes in expected location.  2. No pneumothorax.  3. Mild bibasilar atelectasis left greater than right.        Electronically Signed: Giovanni Roman MD    12/3/2023 12:11 PM EST    Workstation ID: [id number]            EKG      I personally viewed and interpreted the patient's EKG/Telemetry data:    ECHOCARDIOGRAM:  Results for orders placed during the hospital encounter of 12/01/23    Adult Transthoracic Echo Complete w/ Color, Spectral and Contrast if Necessary Per Protocol    Interpretation Summary    Left ventricular ejection fraction appears to be 61 - 65%.    Saline test results are negative.       STRESS MYOVIEW:  Results for orders placed during the hospital encounter of 11/28/23    Stress Test With Myocardial Perfusion One Day    Interpretation Summary    Left ventricular ejection fraction is normal (Calculated EF = 60%).    High risk for ischemic heart disease.    Myocardial perfusion imaging indicates a  large-sized, severe area of ischemia located in the anterior wall, apex and septal wall.    Impressions are consistent with a high risk study.       CARDIAC CATHETERIZATION:  Results for orders placed during the hospital encounter of 12/01/23    Cardiac Catheterization/Vascular Study       OTHER:         Assessment & Plan     Angina with abnormal Myoview  Coronary artery disease  Patient had chest pain with risk factors for coronary disease and had an abnormal Myoview and hence he underwent cardiac catheterization  Patient had significant left main and three-vessel coronary disease and is scheduled for coronary bypass surgery  Cardiac surgeons have seen the patient is having workup done for pre-CABG.  Patient had an echocardiogram which showed normal LV systolic function  Patient is currently stable on medical therapy with aspirin statins and beta-blockers  Patient had a positive MRSA screen.  Patient had a vein mapping which is adequate on carotid duplex which is normal and his COVID screen is negative.  He also has PFTs which were within normal limits.  Patient underwent coronary artery bypass surgery x 3 vessels with a LIMA to LAD and SVG to the marginal branch and SVG to the RCA without any complications  Patient is currently intubated and is off sedation  Patient's chest tubes output is good  Patient's urine output is good  Will try to extubate him soon.    Hypertension  Patient is on beta-blockers but will be started after he is extubated    Hyperlipidemia  Patient is on Crestor and his lipid levels are followed by the primary care doctor    I discussed the patients findings and my recommendations with patient and nurse    Fred Lopez MD  12/03/23  17:00 EST                Electronically signed by Fred Lopez MD at 12/03/23 1701       Fred Lopez MD at 12/02/23 1616          CARDIOLOGY PROGRESS NOTE:    John Paul Tineo  58 y.o.  male  1965  [id number]      Referring Provider: Cardiac  surgeon    Reason for follow-up: Coronary artery disease     Patient Care Team:  Morris Mercado PA-C as PCP - General (Physician Assistant)  Fred Lopez MD as Consulting Physician (Cardiology)    Subjective .  No chest pain or shortness of breath    Objective lying in bed comfortably     Review of Systems   Constitutional: Negative for fever and malaise/fatigue.   HENT:  Negative for ear pain and nosebleeds.    Eyes:  Negative for blurred vision and double vision.   Cardiovascular:  Negative for chest pain, dyspnea on exertion and palpitations.   Respiratory:  Negative for cough and shortness of breath.    Skin:  Negative for rash.   Musculoskeletal:  Negative for joint pain.   Gastrointestinal:  Negative for abdominal pain, nausea and vomiting.   Neurological:  Negative for focal weakness and headaches.   Psychiatric/Behavioral:  Negative for depression. The patient is not nervous/anxious.    All other systems reviewed and are negative.      Allergies: Patient has no known allergies.    Scheduled Meds:[START ON 12/3/2023] ceFAZolin, 2,000 mg, Intravenous, Once  chlorhexidine, 15 mL, Mouth/Throat, Q12H  Chlorhexidine Gluconate Cloth, 1 application , Topical, Q12H  [START ON 12/3/2023] metoprolol tartrate, 12.5 mg, Oral, Once  mupirocin, 1 application , Each Nare, BID  [START ON 12/3/2023] vancomycin, 15 mg/kg, Intravenous, Once      Continuous Infusions:[START ON 12/3/2023] insulin, 0-100 Units/hr  sodium chloride, 30 mL/hr, Last Rate: 30 mL/hr (12/01/23 0739)  sodium chloride, 75 mL/hr, Last Rate: 75 mL/hr (12/01/23 1154)  [START ON 12/3/2023] sodium chloride, 30 mL/hr      PRN Meds:.  acetaminophen    ALPRAZolam    atropine    dextrose    dextrose    glucagon (human recombinant)    nitroglycerin    sodium chloride    sodium chloride    [START ON 12/3/2023] sodium chloride        VITAL SIGNS  Vitals:    12/02/23 0645 12/02/23 0700 12/02/23 0800 12/02/23 1200   BP:   121/66    BP Location:   Right arm   "  Patient Position:   Sitting    Pulse: 52 54 65 56   Resp:   17 21   Temp:   97.8 °F (36.6 °C) 98 °F (36.7 °C)   TempSrc:   Oral Oral   SpO2: 96% 95% 90% 96%   Weight:       Height:           Flowsheet Rows      Flowsheet Row First Filed Value   Admission Height 170.2 cm (67\") Documented at 12/01/2023 0715   Admission Weight 90.9 kg (200 lb 6.4 oz) Documented at 12/01/2023 0715             TELEMETRY: Sinus rhythm    Physical Exam:  Constitutional:       Appearance: Well-developed.   Eyes:      General: No scleral icterus.     Conjunctiva/sclera: Conjunctivae normal.      Pupils: Pupils are equal, round, and reactive to light.   HENT:      Head: Normocephalic and atraumatic.   Neck:      Vascular: No carotid bruit or JVD.   Pulmonary:      Effort: Pulmonary effort is normal.      Breath sounds: Normal breath sounds. No wheezing. No rales.   Cardiovascular:      Normal rate. Regular rhythm.   Pulses:     Intact distal pulses.   Abdominal:      General: Bowel sounds are normal.      Palpations: Abdomen is soft.   Musculoskeletal: Normal range of motion.      Cervical back: Normal range of motion and neck supple. Skin:     General: Skin is warm and dry.      Findings: No rash.   Neurological:      Mental Status: Alert.      Comments: No focal deficits          Results Review:   I reviewed the patient's new clinical results.  Lab Results (last 24 hours)       Procedure Component Value Units Date/Time    Lipid Panel [[id number]]  (Abnormal) Collected: 12/02/23 0606    Specimen: Blood Updated: 12/02/23 1345     Total Cholesterol 165 mg/dL      Triglycerides 76 mg/dL      HDL Cholesterol 44 mg/dL      LDL Cholesterol  106 mg/dL      VLDL Cholesterol 15 mg/dL      LDL/HDL Ratio 2.40    Narrative:      Cholesterol Reference Ranges  (U.S. Department of Health and Human Services ATP III Classifications)    Desirable          <200 mg/dL  Borderline High    200-239 mg/dL  High Risk          >240 mg/dL      Triglyceride Reference " Ranges  (U.S. Department of Health and Human Services ATP III Classifications)    Normal           <150 mg/dL  Borderline High  150-199 mg/dL  High             200-499 mg/dL  Very High        >500 mg/dL    HDL Reference Ranges  (U.S. Department of Health and Human Services ATP III Classifications)    Low     <40 mg/dl (major risk factor for CHD)  High    >60 mg/dl ('negative' risk factor for CHD)        LDL Reference Ranges  (U.S. Department of Health and Human Services ATP III Classifications)    Optimal          <100 mg/dL  Near Optimal     100-129 mg/dL  Borderline High  130-159 mg/dL  High             160-189 mg/dL  Very High        >189 mg/dL    Hemoglobin A1c [[id number]]  (Normal) Collected: 12/02/23 0606    Specimen: Blood Updated: 12/02/23 1316     Hemoglobin A1C 5.50 %     Narrative:      Hemoglobin A1C Ranges:    Increased Risk for Diabetes  5.7% to 6.4%  Diabetes                     >= 6.5%  Diabetic Goal                < 7.0%    Extra Tubes [[id number]] Collected: 12/02/23 0606    Specimen: Blood, Venous Line Updated: 12/02/23 0715    Narrative:      The following orders were created for panel order Extra Tubes.  Procedure                               Abnormality         Status                     ---------                               -----------         ------                     Gold Top - SST[[id number]]                                   Final result                 Please view results for these tests on the individual orders.    Gold Top - SST [[id number]] Collected: 12/02/23 0606    Specimen: Blood Updated: 12/02/23 0715     Extra Tube Hold for add-ons.     Comment: Auto resulted.       Comprehensive Metabolic Panel [[id number]]  (Abnormal) Collected: 12/02/23 0606    Specimen: Blood Updated: 12/02/23 0641     Glucose 113 mg/dL      BUN 11 mg/dL      Creatinine 0.73 mg/dL      Sodium 138 mmol/L      Potassium 4.5 mmol/L      Chloride 103 mmol/L      CO2 27.0 mmol/L      Calcium 9.4 mg/dL      Total  Protein 6.6 g/dL      Albumin 4.0 g/dL      ALT (SGPT) 33 U/L      AST (SGOT) 21 U/L      Alkaline Phosphatase 73 U/L      Total Bilirubin 0.6 mg/dL      Globulin 2.6 gm/dL      A/G Ratio 1.5 g/dL      BUN/Creatinine Ratio 15.1     Anion Gap 8.0 mmol/L      eGFR 106.1 mL/min/1.73     Narrative:      GFR Normal >60  Chronic Kidney Disease <60  Kidney Failure <15      CBC & Differential [[id number]]  (Abnormal) Collected: 12/02/23 0606    Specimen: Blood Updated: 12/02/23 0628    Narrative:      The following orders were created for panel order CBC & Differential.  Procedure                               Abnormality         Status                     ---------                               -----------         ------                     CBC Auto Differential[[id number]]        Abnormal            Final result                 Please view results for these tests on the individual orders.    CBC Auto Differential [[id number]]  (Abnormal) Collected: 12/02/23 0606    Specimen: Blood Updated: 12/02/23 0628     WBC 7.00 10*3/mm3      RBC 4.47 10*6/mm3      Hemoglobin 14.9 g/dL      Hematocrit 44.1 %      MCV 98.6 fL      MCH 33.3 pg      MCHC 33.7 g/dL      RDW 12.8 %      RDW-SD 46.8 fl      MPV 9.0 fL      Platelets 237 10*3/mm3      Neutrophil % 63.0 %      Lymphocyte % 24.6 %      Monocyte % 9.2 %      Eosinophil % 2.1 %      Basophil % 1.1 %      Neutrophils, Absolute 4.40 10*3/mm3      Lymphocytes, Absolute 1.70 10*3/mm3      Monocytes, Absolute 0.60 10*3/mm3      Eosinophils, Absolute 0.10 10*3/mm3      Basophils, Absolute 0.10 10*3/mm3      nRBC 0.1 /100 WBC     Protime-INR [[id number]]  (Normal) Collected: 12/02/23 0606    Specimen: Blood Updated: 12/02/23 0624     Protime 10.4 Seconds      INR 0.95    aPTT [[id number]]  (Normal) Collected: 12/02/23 0606    Specimen: Blood Updated: 12/02/23 0624     PTT 30.4 seconds     Platelet Function ADP [[id number]]  (Normal) Collected: 12/02/23 0606    Specimen: Blood Updated:  12/02/23 0615     ADP Aggregation, % Platelet 97 %     COVID PRE-OP / PRE-PROCEDURE SCREENING ORDER (NO ISOLATION) - Swab, Nasopharynx [[id number]]  (Normal) Collected: 12/01/23 2050    Specimen: Swab from Nasopharynx Updated: 12/01/23 2120    Narrative:      The following orders were created for panel order COVID PRE-OP / PRE-PROCEDURE SCREENING ORDER (NO ISOLATION) - Swab, Nasopharynx.  Procedure                               Abnormality         Status                     ---------                               -----------         ------                     COVID-19,CEPHEID/GERMÁN,CO...[[id number]]  Normal              Final result                 Please view results for these tests on the individual orders.    COVID-19,CEPHEID/GERMÁN,COR/AZAR/PAD/[facility] IN-HOUSE,NP SWAB IN TRANSPORT MEDIA 1 HR TAT, RT-PCR - Swab, Nasopharynx [[id number]]  (Normal) Collected: 12/01/23 2050    Specimen: Swab from Nasopharynx Updated: 12/01/23 2120     COVID19 Not Detected    Narrative:      Fact sheet for providers: https://www.fda.gov/media/536212/download     Fact sheet for patients: https://www.fda.gov/media/514285/download  Fact sheet for providers: https://www.fda.gov/media/274071/download    Fact sheet for patients: https://www.fda.gov/media/796325/download    Test performed by PCR.    Urinalysis With Culture If Indicated - Urine, Clean Catch [[id number]]  (Normal) Collected: 12/01/23 2050    Specimen: Urine, Clean Catch Updated: 12/01/23 2102     Color, UA Yellow     Appearance, UA Clear     pH, UA 5.5     Specific Gravity, UA 1.020     Glucose, UA Negative     Ketones, UA Negative     Bilirubin, UA Negative     Blood, UA Negative     Protein, UA Negative     Leuk Esterase, UA Negative     Nitrite, UA Negative     Urobilinogen, UA 1.0 E.U./dL    Narrative:      In absence of clinical symptoms, the presence of pyuria, bacteria, and/or nitrites on the urinalysis result does not correlate with infection.  Urine microscopic not  indicated.    POC Glucose Once [[id number]]  (Normal) Collected: 12/01/23 2003    Specimen: Blood Updated: 12/01/23 2005     Glucose 91 mg/dL      Comment: Serial Number: [id number]:  [id number]       MRSA Screen, PCR (Inpatient) - Swab, Nares [[id number]]  (Abnormal) Collected: 12/01/23 1841    Specimen: Swab from Nares Updated: 12/01/23 2003     MRSA PCR MRSA Detected    Narrative:      The negative predictive value of this diagnostic test is high and should only be used to consider de-escalating anti-MRSA therapy. A positive result may indicate colonization with MRSA and must be correlated clinically.            Imaging Results (Last 24 Hours)       ** No results found for the last 24 hours. **            EKG      I personally viewed and interpreted the patient's EKG/Telemetry data:    ECHOCARDIOGRAM:  Results for orders placed during the hospital encounter of 12/01/23    Adult Transthoracic Echo Complete w/ Color, Spectral and Contrast if Necessary Per Protocol    Interpretation Summary    Left ventricular ejection fraction appears to be 61 - 65%.    Saline test results are negative.       STRESS MYOVIEW:  Results for orders placed during the hospital encounter of 11/28/23    Stress Test With Myocardial Perfusion One Day    Interpretation Summary    Left ventricular ejection fraction is normal (Calculated EF = 60%).    High risk for ischemic heart disease.    Myocardial perfusion imaging indicates a large-sized, severe area of ischemia located in the anterior wall, apex and septal wall.    Impressions are consistent with a high risk study.       CARDIAC CATHETERIZATION:  Results for orders placed during the hospital encounter of 12/01/23    Cardiac Catheterization/Vascular Study       OTHER:         Assessment & Plan     Angina with abnormal Myoview  Patient had chest pain with risk factors for coronary disease and had an abnormal Myoview and hence he underwent cardiac catheterization  Patient had  significant left main and three-vessel coronary disease and is scheduled for coronary bypass surgery  Cardiac surgeons have seen the patient is having workup done for pre-CABG.  Patient had an echocardiogram which showed normal LV systolic function  Patient is currently stable on medical therapy with aspirin statins and beta-blockers  Patient had a positive MRSA screen.  Patient had a vein mapping which is adequate on carotid duplex which is normal and his COVID screen is negative.  He also has PFTs which were within normal limits.    Hypertension  Patient is on beta-blockers    Hyperlipidemia  Patient is on Crestor and his lipid levels are followed by the primary care doctor    I discussed the patients findings and my recommendations with patient and nurse    Fred Lopez MD  23  16:16 EST                Electronically signed by Fred Lopez MD at 23 1618       Tammy Diop APRN at 23 0824       Attestation signed by Jeffery Salmon MD at 23 1347    I have reviewed this documentation and agree.                  CC:  substernal discomfort/ gas pain     F/U:  MV CAD including left main disease--Luis Antonio  EF 60-65% (echo)    Subjective:  no c/o's, denies any c/o CP/ SOA    No events overnight  Plans for CABG tomorrow    PREOP studies:  Carotid duplex:  normal  Vein georges:  adequate  A1c:  pending   Plt ag%  MRSA screen:  POSITIVE  COVID-19 screen:  negative  UA:  negative  PFTs:  FEV1/, FEV1 75, DLCO 82      Intake/Output Summary (Last 24 hours) at 2023 0824  Last data filed at 2023 1602  Gross per 24 hour   Intake 645 ml   Output 700 ml   Net -55 ml     Temp:  [97.6 °F (36.4 °C)-98 °F (36.7 °C)] 98 °F (36.7 °C)  Heart Rate:  [47-68] 54  Resp:  [-] 23  BP: ()/(46-97) 119/69      Results from last 7 days   Lab Units 23  0606 23  0617   WBC 10*3/mm3 7.00 8.30   HEMOGLOBIN g/dL 14.9 15.1   HEMATOCRIT % 44.1 45.0    PLATELETS 10*3/mm3 237 275   INR  0.95  --      Results from last 7 days   Lab Units 12/02/23  0606   CREATININE mg/dL 0.73*   POTASSIUM mmol/L 4.5   SODIUM mmol/L 138         Physical Exam:  Neuro intact, nad, just showered, mother at bedside  Tele:  SB 50s  CTA, on room air 95%  Benign abd, no BM  No edema    Assessment/Plan:  Principal Problem:    CAD (coronary artery disease)  Active Problems:    Angina at rest    Abnormal nuclear stress test    - MV CAD including left main disease, EF 60% (stress test)--surgical workup in progress  - HTN--stable  - HLD--statin  - Preop sinus bradycardia--HR 50s  - Tobacco abuse--35 pack year hx, PFTs noted, cessation d/w pt  - MRSA nasal colonization--mupirocin/ vanc     Plans for CABG tomorrow morning.  Questions answered.  Tobacco cessation d/w pt and mother.  Echo with normal LV function, no significant VHD  Preop antibiotics ordered--Kefzol/ vancomycin.    Tammy Ny-Tani, APRN  12/2/2023  08:24 EST     Electronically signed by Jeffery Salmon MD at 12/03/23 1347       Fred Lopez MD at 12/01/23 1421          CARDIOLOGY PROGRESS NOTE:    John Paul Tineo  58 y.o.  male  1965  [id number]      Referring Provider: Cardiac surgeon    Reason for follow-up: Coronary artery disease     Patient Care Team:  Morris Mercado PA-C as PCP - General (Physician Assistant)  Fred Lopez MD as Consulting Physician (Cardiology)    Subjective .  Patient does not have any chest pain today    Objective lying in bed comfortably     Review of Systems   Constitutional: Negative for fever and malaise/fatigue.   HENT:  Negative for ear pain and nosebleeds.    Eyes:  Negative for blurred vision and double vision.   Cardiovascular:  Negative for chest pain, dyspnea on exertion and palpitations.   Respiratory:  Negative for cough and shortness of breath.    Skin:  Negative for rash.   Musculoskeletal:  Negative for joint pain.   Gastrointestinal:  Negative for  "abdominal pain, nausea and vomiting.   Neurological:  Negative for focal weakness and headaches.   Psychiatric/Behavioral:  Negative for depression. The patient is not nervous/anxious.    All other systems reviewed and are negative.      Allergies: Patient has no known allergies.    Scheduled Meds:[START ON 12/3/2023] ceFAZolin, 2,000 mg, Intravenous, Once  [START ON 12/2/2023] chlorhexidine, 15 mL, Mouth/Throat, Q12H  Chlorhexidine Gluconate Cloth, 1 application , Topical, Q12H  [START ON 12/3/2023] metoprolol tartrate, 12.5 mg, Oral, Once  [START ON 12/2/2023] mupirocin, 1 application , Each Nare, Q12H      Continuous Infusions:[START ON 12/3/2023] insulin, 0-100 Units/hr  sodium chloride, 30 mL/hr, Last Rate: 30 mL/hr (12/01/23 0739)  sodium chloride, 75 mL/hr, Last Rate: 75 mL/hr (12/01/23 1154)  [START ON 12/3/2023] sodium chloride, 30 mL/hr      PRN Meds:.  acetaminophen    ALPRAZolam    atropine    dextrose    dextrose    glucagon (human recombinant)    nitroglycerin    sodium chloride    sodium chloride    [START ON 12/3/2023] sodium chloride        VITAL SIGNS  Vitals:    12/01/23 1245 12/01/23 1300 12/01/23 1315 12/01/23 1330   BP:  134/80 139/78 129/79   BP Location:       Patient Position:       Pulse: 68 64 65 67   Resp:       Temp:       TempSrc:       SpO2: 95% 95% 91% 96%   Weight:       Height:           Flowsheet Rows      Flowsheet Row First Filed Value   Admission Height 170.2 cm (67\") Documented at 12/01/2023 0715   Admission Weight 90.9 kg (200 lb 6.4 oz) Documented at 12/01/2023 0715             TELEMETRY: Sinus rhythm    Physical Exam:  Constitutional:       Appearance: Well-developed.   Eyes:      General: No scleral icterus.     Conjunctiva/sclera: Conjunctivae normal.      Pupils: Pupils are equal, round, and reactive to light.   HENT:      Head: Normocephalic and atraumatic.   Neck:      Vascular: No carotid bruit or JVD.   Pulmonary:      Effort: Pulmonary effort is normal.      Breath " sounds: Normal breath sounds. No wheezing. No rales.   Cardiovascular:      Normal rate. Regular rhythm.   Pulses:     Intact distal pulses.   Abdominal:      General: Bowel sounds are normal.      Palpations: Abdomen is soft.   Musculoskeletal: Normal range of motion.      Cervical back: Normal range of motion and neck supple. Skin:     General: Skin is warm and dry.      Findings: No rash.   Neurological:      Mental Status: Alert.      Comments: No focal deficits          Results Review:   I reviewed the patient's new clinical results.  Lab Results (last 24 hours)       Procedure Component Value Units Date/Time    POC Activated Clotting Time [[id number]]  (Abnormal) Collected: 12/01/23 1118    Specimen: Arterial Blood Updated: 12/01/23 1153     Activated Clotting Time  >1,000 Seconds      Comment: Serial Number: [id number]:  [id number]       Blood Gas, Arterial - [[id number]]  (Abnormal) Collected: 12/01/23 1114    Specimen: Arterial Blood Updated: 12/01/23 1136     Site Arterial Line     Valentino's Test N/A     pH, Arterial 7.375 pH units      pCO2, Arterial 41.7 mm Hg      pO2, Arterial 67.8 mm Hg      HCO3, Arterial 24.4 mmol/L      Base Excess, Arterial -0.9 mmol/L      Comment: Serial Number: [id number]:  [id number]        O2 Saturation, Arterial 92.7 %      CO2 Content 25.7 mmol/L      Barometric Pressure for Blood Gas --     Comment: N/A        Modality Room Air     FIO2 21 %      Hemodilution No            Imaging Results (Last 24 Hours)       Procedure Component Value Units Date/Time    XR Chest 1 View [[id number]] Collected: 12/01/23 1333     Updated: 12/01/23 1335    Narrative:      XR CHEST 1 VW    Date of Exam: 12/1/2023 1:14 PM EST    Indication: preop CABG  preop cardiac surgery    Comparison: CT chest 10/17/2023    Findings:  No acute airspace disease. Minimal linear scarring in the left lower lobe. Heart size is normal. No pleural effusion or pneumothorax. No acute or suspicious osseous abnormality.       Impression:      Impression:  No acute chest finding.      Electronically Signed: Mindy Longoria MD    12/1/2023 1:33 PM EST    Workstation ID: [id number]            EKG      I personally viewed and interpreted the patient's EKG/Telemetry data:    ECHOCARDIOGRAM:       STRESS MYOVIEW:  Results for orders placed during the hospital encounter of 11/28/23    Stress Test With Myocardial Perfusion One Day    Interpretation Summary    Left ventricular ejection fraction is normal (Calculated EF = 60%).    High risk for ischemic heart disease.    Myocardial perfusion imaging indicates a large-sized, severe area of ischemia located in the anterior wall, apex and septal wall.    Impressions are consistent with a high risk study.       CARDIAC CATHETERIZATION:  Results for orders placed during the hospital encounter of 12/01/23    Cardiac Catheterization/Vascular Study       OTHER:         Assessment & Plan     Angina with abnormal Myoview  Patient had chest pain with risk factors for coronary disease and had an abnormal Myoview and hence he underwent cardiac catheterization  Patient had significant left main and three-vessel coronary disease and is scheduled for coronary bypass surgery  Cardiac surgeons have seen the patient is having workup done for pre-CABG.  Patient will have a echocardiogram performed  Patient is on aspirin beta-blocker statins.    Hypertension  Patient is on beta-blockers    Hyperlipidemia  Patient is on Crestor and his lipid levels are followed by the primary care doctor    I discussed the patients findings and my recommendations with patient and nurse    Fred Lopez MD  12/01/23  14:21 EST                Electronically signed by Fred Lopez MD at 12/01/23 1423          Consult Notes (last 72 hours)        Satterly-Tammy Freire APRN at 12/01/23 1223        Consult Orders    1. Inpatient Cardiothoracic Surgery Consult [[id number]] ordered by Fred Lopez MD at 12/01/23 5270               Attestation signed by Jeffery Salmon MD at 23 1639    I have reviewed this documentation and agree.                    Patient Care Team:  Morris Mercado PA-C as PCP - General (Physician Assistant)  Fred Lopez MD as Consulting Physician (Cardiology)  Referring Provider:  Dr. Lopez  Reason for consultation:  MV CAD    Chief complaint:  substernal discomfort/ gas pain    Subjective     History of Present Illness:  59 y/o gentleman presented today for elective heart cath following abnormal stress test in late November.  He had lung CT screening for lung cancer and had notable calcified coronary arteries.  He reports substernal discomfort/ gas pains with mild SOA that he has been treating with Gas-X.  He has HTN, HLD, and tobacco abuse.  His father  at age 37 from heart disease but also had Hodgkin's lymphoma with cobalt treatments.  His cardiac cath showed MV CAD.  Dr. Salmon was asked to evaluate pt for surgical revascularization.    Review of Systems   Respiratory:  Positive for shortness of breath.    Cardiovascular:  Positive for chest pain.   Neurological:  Negative for dizziness and light-headedness.        Past Medical History:   Diagnosis Date    Chest pain     Hyperlipidemia     Shortness of breath      Past Surgical History:   Procedure Laterality Date    COLONOSCOPY      KNEE ARTHROSCOPY Left 2020    Procedure: KNEE ARTHROSCOPY with partial and lateral  MENISCECTOMY AND CHONROPLASTY;  Surgeon: Yovany Jacobs II, MD;  Location: [facility];  Service: Orthopedics;  Laterality: Left;     Family History   Problem Relation Age of Onset    Hyperlipidemia Mother     Hyperlipidemia Father     Heart attack Father      Social History     Tobacco Use    Smoking status: Former     Packs/day: .5     Types: Cigarettes     Quit date: 2023     Years since quittin.0     Passive exposure: Current    Smokeless tobacco: Never   Vaping Use    Vaping Use: Never used  "  Substance Use Topics    Alcohol use: Yes     Alcohol/week: 24.0 standard drinks of alcohol     Types: 24 Cans of beer per week    Drug use: Never     Medications Prior to Admission   Medication Sig Dispense Refill Last Dose    aspirin 81 MG EC tablet Take 1 tablet by mouth Daily.   12/1/2023    metoprolol succinate XL (TOPROL-XL) 25 MG 24 hr tablet Take 1 tablet by mouth Daily. 90 tablet 3 11/30/2023    rosuvastatin (CRESTOR) 5 MG tablet Take 1 tablet by mouth Daily.   12/1/2023    nitroglycerin (NITROSTAT) 0.4 MG SL tablet Place 1 tablet under the tongue Every 5 (Five) Minutes As Needed for Chest Pain. Take no more than 3 doses in 15 minutes.   More than a month        Allergies:  Patient has no known allergies.    Objective      Vital Signs  Temp:  [97.6 °F (36.4 °C)] 97.6 °F (36.4 °C)  Heart Rate:  [54-59] 56  Resp:  [12-22] 12  BP: (100-137)/(59-97) 137/78    Flowsheet Rows      Flowsheet Row First Filed Value   Admission Height 170.2 cm (67\") Documented at 12/01/2023 0715   Admission Weight 90.9 kg (200 lb 6.4 oz) Documented at 12/01/2023 0715          170.2 cm (67\")    Physical Exam  Vitals and nursing note reviewed.   Constitutional:       General: He is awake.      Appearance: Normal appearance. He is well-developed and well-groomed.      Comments: Seen in [facility] with mother/daughter at bedside   HENT:      Head: Normocephalic and atraumatic.      Nose: Nose normal.      Mouth/Throat:      Lips: Pink.      Mouth: Mucous membranes are moist.      Pharynx: Uvula midline.   Eyes:      General: Lids are normal. No scleral icterus.     Extraocular Movements: Extraocular movements intact.      Conjunctiva/sclera: Conjunctivae normal.      Pupils: Pupils are equal, round, and reactive to light.   Neck:      Thyroid: No thyroid mass or thyromegaly.      Vascular: Normal carotid pulses. No carotid bruit, hepatojugular reflux or JVD.      Trachea: Trachea normal.   Cardiovascular:      Rate and Rhythm: Normal rate. " Bradycardia present.      Pulses:           Carotid pulses are 2+ on the right side and 2+ on the left side.       Radial pulses are 2+ on the right side and 2+ on the left side.        Femoral pulses are 2+ on the right side and 2+ on the left side.       Popliteal pulses are 2+ on the right side and 2+ on the left side.        Dorsalis pedis pulses are 2+ on the right side and 2+ on the left side.        Posterior tibial pulses are 2+ on the right side and 2+ on the left side.      Heart sounds: Normal heart sounds. No murmur heard.     Comments: Tele:  SB 50s  Right femoral cath site soft without hematoma noted  Pulmonary:      Effort: Pulmonary effort is normal.      Breath sounds: Normal breath sounds.      Comments: 97% RA  Abdominal:      General: Abdomen is protuberant. Bowel sounds are normal. There is no distension.      Palpations: Abdomen is soft.      Tenderness: There is no abdominal tenderness.   Musculoskeletal:      Cervical back: Neck supple.      Comments: Gait steady and strong without use of assistive devices   Lymphadenopathy:      Cervical: No cervical adenopathy.      Upper Body:      Right upper body: No supraclavicular adenopathy.      Left upper body: No supraclavicular adenopathy.   Skin:     General: Skin is warm and dry.      Capillary Refill: Capillary refill takes less than 2 seconds.      Findings: No erythema or rash.      Nails: There is no clubbing.   Neurological:      Mental Status: He is alert and oriented to person, place, and time.      GCS: GCS eye subscore is 4. GCS verbal subscore is 5. GCS motor subscore is 6.   Psychiatric:         Attention and Perception: Attention and perception normal.         Mood and Affect: Mood and affect normal.         Speech: Speech normal.         Behavior: Behavior normal. Behavior is cooperative.         Thought Content: Thought content normal.         Cognition and Memory: Cognition and memory normal.         Judgment: Judgment normal.          Results Review:   Lab Results (last 24 hours)       Procedure Component Value Units Date/Time    POC Activated Clotting Time [[id number]]  (Abnormal) Collected: 12/01/23 1118    Specimen: Arterial Blood Updated: 12/01/23 1153     Activated Clotting Time  >1,000 Seconds      Comment: Serial Number: [id number]:  [id number]       Blood Gas, Arterial - [[id number]]  (Abnormal) Collected: 12/01/23 1114    Specimen: Arterial Blood Updated: 12/01/23 1136     Site Arterial Line     Valentino's Test N/A     pH, Arterial 7.375 pH units      pCO2, Arterial 41.7 mm Hg      pO2, Arterial 67.8 mm Hg      HCO3, Arterial 24.4 mmol/L      Base Excess, Arterial -0.9 mmol/L      Comment: Serial Number: [id number]:  [id number]        O2 Saturation, Arterial 92.7 %      CO2 Content 25.7 mmol/L      Barometric Pressure for Blood Gas --     Comment: N/A        Modality Room Air     FIO2 21 %      Hemodilution No                Assessment & Plan     Cardiac cath by Dr. Lopez 12/1/2023  LVEDP:8 Estimated EF %:60  Initial Aortic Pressure: 110/70  AV Gradient: No gradient    Wall Motion:   Dominance: [ ] Left [ ] Right [ x] Co-Dominant     Coronary Arteriography: (Please Code highest degree of stenosis)  Left Main %: 70 to 80% distal  Proximal LAD %: 100% with a left to left collaterals  Mid/Distal LAD %: 100% with left to left collaterals   LCX %: 70% to 80% ostial to proximal   ramus:   RCA %: 90 to 99% proximal and mid  Lima %:   SVG(s) %:       CAD (coronary artery disease)    Angina at rest    Abnormal nuclear stress test      Assessment & Plan    - MV CAD including left main disease, EF 60% (stress test)--surgical workup in progress  - HTN--stable  - HLD--statin  - Preop sinus bradycardia--HR 50s  - Tobacco abuse--35 pack year hx, PFTs pending    Dr. Salmon reviewed cath and d/w pt/mother/darci at bedside.  Plans for CABG on Sunday, 12/3.  Preop orders placed.  PFTs, echo, and vascular studies have been ordered.  COVID screen  tomorrow.  Kefzol ordered for preop antibiotic.    Thank you for allowing us to participate in the care of this patient.      Tammy Diop, PEDRO  12/01/23  12:23 EST    **all problems new to this examiner  **EKG and CXR independently reviewed and interpreted           Electronically signed by Jeffery Salmon MD at 12/01/23 3414

## 2023-12-05 ENCOUNTER — APPOINTMENT (OUTPATIENT)
Dept: GENERAL RADIOLOGY | Facility: HOSPITAL | Age: 58
DRG: 234 | End: 2023-12-05
Payer: COMMERCIAL

## 2023-12-05 LAB
ANION GAP SERPL CALCULATED.3IONS-SCNC: 12 MMOL/L (ref 5–15)
BUN SERPL-MCNC: 10 MG/DL (ref 6–20)
BUN/CREAT SERPL: 15.6 (ref 7–25)
CALCIUM SPEC-SCNC: 9.2 MG/DL (ref 8.6–10.5)
CHLORIDE SERPL-SCNC: 98 MMOL/L (ref 98–107)
CO2 SERPL-SCNC: 25 MMOL/L (ref 22–29)
CREAT SERPL-MCNC: 0.64 MG/DL (ref 0.76–1.27)
DEPRECATED RDW RBC AUTO: 45.9 FL (ref 37–54)
EGFRCR SERPLBLD CKD-EPI 2021: 110.4 ML/MIN/1.73
ERYTHROCYTE [DISTWIDTH] IN BLOOD BY AUTOMATED COUNT: 12.6 % (ref 12.3–15.4)
GLUCOSE BLDC GLUCOMTR-MCNC: 102 MG/DL (ref 70–105)
GLUCOSE BLDC GLUCOMTR-MCNC: 120 MG/DL (ref 70–105)
GLUCOSE BLDC GLUCOMTR-MCNC: 121 MG/DL (ref 70–105)
GLUCOSE BLDC GLUCOMTR-MCNC: 95 MG/DL (ref 70–105)
GLUCOSE SERPL-MCNC: 92 MG/DL (ref 65–99)
HCT VFR BLD AUTO: 35.6 % (ref 37.5–51)
HGB BLD-MCNC: 12.3 G/DL (ref 13–17.7)
MCH RBC QN AUTO: 34 PG (ref 26.6–33)
MCHC RBC AUTO-ENTMCNC: 34.4 G/DL (ref 31.5–35.7)
MCV RBC AUTO: 98.8 FL (ref 79–97)
PLATELET # BLD AUTO: 203 10*3/MM3 (ref 140–450)
PMV BLD AUTO: 9.4 FL (ref 6–12)
POTASSIUM SERPL-SCNC: 3.8 MMOL/L (ref 3.5–5.2)
POTASSIUM SERPL-SCNC: 4.1 MMOL/L (ref 3.5–5.2)
RBC # BLD AUTO: 3.6 10*6/MM3 (ref 4.14–5.8)
SODIUM SERPL-SCNC: 135 MMOL/L (ref 136–145)
WBC NRBC COR # BLD AUTO: 15.5 10*3/MM3 (ref 3.4–10.8)

## 2023-12-05 PROCEDURE — 99232 SBSQ HOSP IP/OBS MODERATE 35: CPT | Performed by: INTERNAL MEDICINE

## 2023-12-05 PROCEDURE — 71045 X-RAY EXAM CHEST 1 VIEW: CPT

## 2023-12-05 PROCEDURE — 25010000002 FUROSEMIDE PER 20 MG: Performed by: PHYSICIAN ASSISTANT

## 2023-12-05 PROCEDURE — 25010000002 MORPHINE PER 10 MG: Performed by: NURSE PRACTITIONER

## 2023-12-05 PROCEDURE — 85027 COMPLETE CBC AUTOMATED: CPT | Performed by: NURSE PRACTITIONER

## 2023-12-05 PROCEDURE — 93005 ELECTROCARDIOGRAM TRACING: CPT | Performed by: NURSE PRACTITIONER

## 2023-12-05 PROCEDURE — 80048 BASIC METABOLIC PNL TOTAL CA: CPT | Performed by: NURSE PRACTITIONER

## 2023-12-05 PROCEDURE — 84132 ASSAY OF SERUM POTASSIUM: CPT

## 2023-12-05 PROCEDURE — 25010000002 ENOXAPARIN PER 10 MG: Performed by: NURSE PRACTITIONER

## 2023-12-05 PROCEDURE — 82948 REAGENT STRIP/BLOOD GLUCOSE: CPT

## 2023-12-05 PROCEDURE — 93010 ELECTROCARDIOGRAM REPORT: CPT | Performed by: INTERNAL MEDICINE

## 2023-12-05 PROCEDURE — 25010000002 FUROSEMIDE PER 20 MG: Performed by: THORACIC SURGERY (CARDIOTHORACIC VASCULAR SURGERY)

## 2023-12-05 RX ORDER — CYCLOBENZAPRINE HCL 10 MG
10 TABLET ORAL 3 TIMES DAILY PRN
Status: DISCONTINUED | OUTPATIENT
Start: 2023-12-05 | End: 2023-12-08 | Stop reason: HOSPADM

## 2023-12-05 RX ORDER — FUROSEMIDE 10 MG/ML
20 INJECTION INTRAMUSCULAR; INTRAVENOUS ONCE
Status: COMPLETED | OUTPATIENT
Start: 2023-12-05 | End: 2023-12-05

## 2023-12-05 RX ORDER — POTASSIUM CHLORIDE 750 MG/1
10 TABLET, FILM COATED, EXTENDED RELEASE ORAL ONCE
Status: COMPLETED | OUTPATIENT
Start: 2023-12-05 | End: 2023-12-05

## 2023-12-05 RX ORDER — POTASSIUM CHLORIDE 20 MEQ/1
20 TABLET, EXTENDED RELEASE ORAL ONCE
Status: COMPLETED | OUTPATIENT
Start: 2023-12-05 | End: 2023-12-05

## 2023-12-05 RX ORDER — KETOROLAC TROMETHAMINE 15 MG/ML
15 INJECTION, SOLUTION INTRAMUSCULAR; INTRAVENOUS ONCE AS NEEDED
Status: COMPLETED | OUTPATIENT
Start: 2023-12-05 | End: 2023-12-06

## 2023-12-05 RX ADMIN — POTASSIUM CHLORIDE 20 MEQ: 1500 TABLET, EXTENDED RELEASE ORAL at 08:22

## 2023-12-05 RX ADMIN — METOPROLOL TARTRATE 25 MG: 25 TABLET, FILM COATED ORAL at 08:22

## 2023-12-05 RX ADMIN — HYDROCODONE BITARTRATE AND ACETAMINOPHEN 1 TABLET: 5; 325 TABLET ORAL at 20:08

## 2023-12-05 RX ADMIN — DOCUSATE SODIUM 50 MG AND SENNOSIDES 8.6 MG 2 TABLET: 8.6; 5 TABLET, FILM COATED ORAL at 20:08

## 2023-12-05 RX ADMIN — MUPIROCIN: 20 OINTMENT TOPICAL at 20:08

## 2023-12-05 RX ADMIN — Medication 1 TABLET: at 08:22

## 2023-12-05 RX ADMIN — HYDROCODONE BITARTRATE AND ACETAMINOPHEN 1 TABLET: 5; 325 TABLET ORAL at 16:28

## 2023-12-05 RX ADMIN — Medication 100 MG: at 08:22

## 2023-12-05 RX ADMIN — PANTOPRAZOLE SODIUM 40 MG: 40 TABLET, DELAYED RELEASE ORAL at 08:23

## 2023-12-05 RX ADMIN — FUROSEMIDE 20 MG: 10 INJECTION, SOLUTION INTRAMUSCULAR; INTRAVENOUS at 20:08

## 2023-12-05 RX ADMIN — CYCLOBENZAPRINE 10 MG: 10 TABLET, FILM COATED ORAL at 08:22

## 2023-12-05 RX ADMIN — 0.12% CHLORHEXIDINE GLUCONATE 15 ML: 1.2 RINSE ORAL at 20:08

## 2023-12-05 RX ADMIN — MORPHINE SULFATE 2 MG: 2 INJECTION, SOLUTION INTRAMUSCULAR; INTRAVENOUS at 05:11

## 2023-12-05 RX ADMIN — HYDROCODONE BITARTRATE AND ACETAMINOPHEN 1 TABLET: 5; 325 TABLET ORAL at 00:42

## 2023-12-05 RX ADMIN — POLYETHYLENE GLYCOL 3350 17 G: 17 POWDER, FOR SOLUTION ORAL at 08:21

## 2023-12-05 RX ADMIN — DOCUSATE SODIUM 50 MG AND SENNOSIDES 8.6 MG 2 TABLET: 8.6; 5 TABLET, FILM COATED ORAL at 08:21

## 2023-12-05 RX ADMIN — FOLIC ACID 1 MG: 1 TABLET ORAL at 08:23

## 2023-12-05 RX ADMIN — MORPHINE SULFATE 2 MG: 2 INJECTION, SOLUTION INTRAMUSCULAR; INTRAVENOUS at 02:04

## 2023-12-05 RX ADMIN — HYDROCODONE BITARTRATE AND ACETAMINOPHEN 1 TABLET: 5; 325 TABLET ORAL at 12:33

## 2023-12-05 RX ADMIN — POTASSIUM CHLORIDE 10 MEQ: 750 TABLET, EXTENDED RELEASE ORAL at 09:01

## 2023-12-05 RX ADMIN — MORPHINE SULFATE 2 MG: 2 INJECTION, SOLUTION INTRAMUSCULAR; INTRAVENOUS at 07:46

## 2023-12-05 RX ADMIN — 0.12% CHLORHEXIDINE GLUCONATE 15 ML: 1.2 RINSE ORAL at 08:22

## 2023-12-05 RX ADMIN — MORPHINE SULFATE 2 MG: 2 INJECTION, SOLUTION INTRAMUSCULAR; INTRAVENOUS at 18:25

## 2023-12-05 RX ADMIN — CYCLOBENZAPRINE 10 MG: 10 TABLET, FILM COATED ORAL at 18:47

## 2023-12-05 RX ADMIN — HYDROCODONE BITARTRATE AND ACETAMINOPHEN 1 TABLET: 5; 325 TABLET ORAL at 08:23

## 2023-12-05 RX ADMIN — ASPIRIN 81 MG: 81 TABLET, COATED ORAL at 08:22

## 2023-12-05 RX ADMIN — MUPIROCIN: 20 OINTMENT TOPICAL at 08:22

## 2023-12-05 RX ADMIN — FUROSEMIDE 20 MG: 10 INJECTION, SOLUTION INTRAMUSCULAR; INTRAVENOUS at 09:01

## 2023-12-05 RX ADMIN — ENOXAPARIN SODIUM 40 MG: 100 INJECTION SUBCUTANEOUS at 16:28

## 2023-12-05 RX ADMIN — ATORVASTATIN CALCIUM 40 MG: 40 TABLET, FILM COATED ORAL at 20:08

## 2023-12-05 RX ADMIN — MORPHINE SULFATE 2 MG: 2 INJECTION, SOLUTION INTRAMUSCULAR; INTRAVENOUS at 10:39

## 2023-12-05 NOTE — PROGRESS NOTES
CARDIOLOGY PROGRESS NOTE:    John Paul Tineo  58 y.o.  male  1965  [id number]      Referring Provider: Cardiac surgeon    Reason for follow-up: Coronary artery disease     Patient Care Team:  Morris Mercado PA-C as PCP - General (Physician Assistant)  Fred Lopez MD as Consulting Physician (Cardiology)    Subjective .  No chest pain or shortness of breath.  Chest tubes are out.    Objective i sitting in chair comfortably     Review of Systems   Constitutional: Negative for fever and malaise/fatigue.   HENT:  Negative for ear pain and nosebleeds.    Eyes:  Negative for blurred vision and double vision.   Cardiovascular:  Negative for chest pain, dyspnea on exertion and palpitations.   Respiratory:  Negative for cough and shortness of breath.    Skin:  Negative for rash.   Musculoskeletal:  Negative for joint pain.   Gastrointestinal:  Negative for abdominal pain, nausea and vomiting.   Neurological:  Negative for focal weakness and headaches.   Psychiatric/Behavioral:  Negative for depression. The patient is not nervous/anxious.    All other systems reviewed and are negative.      Allergies: Patient has no known allergies.    Scheduled Meds:aspirin, 81 mg, Oral, Daily  atorvastatin, 40 mg, Oral, Nightly  chlorhexidine, 15 mL, Mouth/Throat, Q12H  enoxaparin, 40 mg, Subcutaneous, Daily  folic acid, 1 mg, Oral, Daily  HYDROcodone-acetaminophen, 1 tablet, Oral, Once  insulin lispro, 2-9 Units, Subcutaneous, 4x Daily AC & at Bedtime  metoprolol tartrate, 25 mg, Oral, Q12H  multivitamin with minerals, 1 tablet, Oral, Daily  mupirocin, , Each Nare, BID  pantoprazole, 40 mg, Oral, Daily  polyethylene glycol, 17 g, Oral, BID  senna-docusate sodium, 2 tablet, Oral, BID  thiamine, 100 mg, Oral, Daily      Continuous Infusions:     PRN Meds:.  acetaminophen **OR** acetaminophen **OR** acetaminophen    Calcium Replacement - Follow Nurse / BPA Driven Protocol    cyclobenzaprine    dextrose    dextrose    glucagon  "(human recombinant)    HYDROcodone-acetaminophen    LORazepam **OR** LORazepam **OR** LORazepam **OR** LORazepam **OR** LORazepam **OR** LORazepam    Magnesium Cardiology Dose Replacement - Follow Nurse / BPA Driven Protocol    Morphine **AND** naloxone    nitroglycerin    ondansetron    Phosphorus Replacement - Follow Nurse / BPA Driven Protocol    Potassium Replacement - Follow Nurse / BPA Driven Protocol        VITAL SIGNS  Vitals:    12/05/23 1256 12/05/23 1300 12/05/23 1400 12/05/23 1500   BP:  117/63 106/68 118/82   Pulse:  83 88 88   Resp:       Temp: 97.4 °F (36.3 °C)      TempSrc: Oral      SpO2:  93% 93% 92%   Weight:       Height:           Flowsheet Rows      Flowsheet Row First Filed Value   Admission Height 170.2 cm (67\") Documented at 12/01/2023 0715   Admission Weight 90.9 kg (200 lb 6.4 oz) Documented at 12/01/2023 0715             TELEMETRY: Ventricular pacemaker rhythm    Physical Exam:  Constitutional:       Appearance: Well-developed.   Eyes:      General: No scleral icterus.     Conjunctiva/sclera: Conjunctivae normal.      Pupils: Pupils are equal, round, and reactive to light.   HENT:      Head: Normocephalic and atraumatic.   Neck:      Vascular: No carotid bruit or JVD.   Pulmonary:      Effort: Pulmonary effort is normal.      Breath sounds: Normal breath sounds. No wheezing. No rales.   Cardiovascular:      Normal rate. Regular rhythm.   Pulses:     Intact distal pulses.   Abdominal:      General: Bowel sounds are normal.      Palpations: Abdomen is soft.   Musculoskeletal: Normal range of motion.      Cervical back: Normal range of motion and neck supple. Skin:     General: Skin is warm and dry.      Findings: No rash.   Neurological:      Mental Status: Alert.      Comments: No focal deficits          Results Review:   I reviewed the patient's new clinical results.  Lab Results (last 24 hours)       Procedure Component Value Units Date/Time    Potassium [[id number]]  (Normal) Collected: " 12/05/23 1335    Specimen: Blood Updated: 12/05/23 1358     Potassium 4.1 mmol/L     POC Glucose Once [[id number]]  (Normal) Collected: 12/05/23 1247    Specimen: Blood Updated: 12/05/23 1252     Glucose 102 mg/dL      Comment: Serial Number: [id number]:  [id number]       POC Glucose Once [[id number]]  (Normal) Collected: 12/05/23 0833    Specimen: Blood Updated: 12/05/23 0835     Glucose 95 mg/dL      Comment: Serial Number: [id number]:  [id number]       Basic Metabolic Panel [[id number]]  (Abnormal) Collected: 12/05/23 0520    Specimen: Blood Updated: 12/05/23 0604     Glucose 92 mg/dL      BUN 10 mg/dL      Creatinine 0.64 mg/dL      Sodium 135 mmol/L      Potassium 3.8 mmol/L      Chloride 98 mmol/L      CO2 25.0 mmol/L      Calcium 9.2 mg/dL      BUN/Creatinine Ratio 15.6     Anion Gap 12.0 mmol/L      eGFR 110.4 mL/min/1.73     Narrative:      GFR Normal >60  Chronic Kidney Disease <60  Kidney Failure <15      CBC (No Diff) [[id number]]  (Abnormal) Collected: 12/05/23 0520    Specimen: Blood Updated: 12/05/23 0531     WBC 15.50 10*3/mm3      RBC 3.60 10*6/mm3      Hemoglobin 12.3 g/dL      Hematocrit 35.6 %      MCV 98.8 fL      MCH 34.0 pg      MCHC 34.4 g/dL      RDW 12.6 %      RDW-SD 45.9 fl      MPV 9.4 fL      Platelets 203 10*3/mm3     POC Glucose Once [[id number]]  (Abnormal) Collected: 12/04/23 1957    Specimen: Blood Updated: 12/04/23 1959     Glucose 120 mg/dL      Comment: Serial Number: [id number]:  [id number]       POC Glucose Once [[id number]]  (Normal) Collected: 12/04/23 1740    Specimen: Blood Updated: 12/04/23 1742     Glucose 96 mg/dL      Comment: Serial Number: [id number]:  [id number]               Imaging Results (Last 24 Hours)       Procedure Component Value Units Date/Time    XR Chest 1 View [[id number]] Collected: 12/05/23 1335     Updated: 12/05/23 1339    Narrative:      XR CHEST 1 VW    Date of Exam: 12/5/2023 1:17 PM EST    Indication: Chest tube  removal    Comparison: 12/5/2023 at 2:28 a.m.    Findings:  In the interval the right internal jugular introducer sheath has been removed. The left basilar thoracostomy tube has also been removed. Patient is again noted be status post median sternotomy. There is mild cardiomegaly. Pulmonary vessels remain   indistinct consistent with pulmonary vascular congestion. Lung volumes are low with persistent left basilar airspace disease likely due to atelectasis. There is no evidence of pneumothorax. Small left pleural effusion is unchanged. No right pleural   effusion.      Impression:      Impression:    1. Interval removal of left thoracostomy tube and right internal jugular introducer sheath.  2. No evidence pneumothorax.  3. No change in left basilar airspace disease and small left pleural effusion.      Electronically Signed: Mg Tran MD    12/5/2023 1:37 PM EST    Workstation ID: [id number]    XR Chest 1 View [[id number]] Collected: 12/05/23 0706     Updated: 12/05/23 0721    Narrative:      XR CHEST 1 VW    Date of Exam: 12/5/2023 2:35 AM EST    Indication: Post-Op Heart Surgery    Comparison: 12/4/2023    Findings:  There is cardiomegaly and surgical changes in the cardiomediastinum. There are left basilar atelectasis or consolidations and trace effusion. The right lung is clear. Interval removal of a right-sided [place]-Moy catheter with the remaining introducer in   place      Impression:      Impression:    Cardiomegaly with postoperative changes    Stable left basilar atelectasis with small effusion    Interval removal of the right [place]-Moy catheter with introducer in place      Electronically Signed: Rosales Pak MD    12/5/2023 7:18 AM EST    Workstation ID: [id number]            EKG      I personally viewed and interpreted the patient's EKG/Telemetry data:    ECHOCARDIOGRAM:  Results for orders placed during the hospital encounter of 12/01/23    Adult Transthoracic Echo Complete w/ Color, Spectral and  Contrast if Necessary Per Protocol    Interpretation Summary    Left ventricular ejection fraction appears to be 61 - 65%.    Saline test results are negative.       STRESS MYOVIEW:  Results for orders placed during the hospital encounter of 11/28/23    Stress Test With Myocardial Perfusion One Day    Interpretation Summary    Left ventricular ejection fraction is normal (Calculated EF = 60%).    High risk for ischemic heart disease.    Myocardial perfusion imaging indicates a large-sized, severe area of ischemia located in the anterior wall, apex and septal wall.    Impressions are consistent with a high risk study.       CARDIAC CATHETERIZATION:  Results for orders placed during the hospital encounter of 12/01/23    Cardiac Catheterization/Vascular Study       OTHER:         Assessment & Plan     Angina with abnormal Myoview  Coronary artery disease  Patient had chest pain with risk factors for coronary disease and had an abnormal Myoview and hence he underwent cardiac catheterization  Patient had significant left main and three-vessel coronary disease and is scheduled for coronary bypass surgery  Cardiac surgeons have seen the patient is having workup done for pre-CABG.  Patient had an echocardiogram which showed normal LV systolic function  Patient is currently stable on medical therapy with aspirin statins and beta-blockers  Patient had a positive MRSA screen.  Patient had a vein mapping which is adequate on carotid duplex which is normal and his COVID screen is negative.  He also has PFTs which were within normal limits.  Patient underwent coronary artery bypass surgery x 3 vessels with a LIMA to LAD and SVG to the marginal branch and SVG to the RCA without any complications  Patient is extubated and sitting in chair  Patient's chest tubes are removed.  Patient's Dodson catheter has been removed  Will ambulate him and will adjust medicines accordingly.    Hypertension  Patient is currently on beta-blockers and  tolerating very well    Hyperlipidemia  Patient is on Crestor and his lipid levels are followed by the primary care doctor    Home soon.    I discussed the patients findings and my recommendations with patient and nurse    Fred Lopez MD  12/05/23  16:00 EST

## 2023-12-05 NOTE — PROGRESS NOTES
" LOS: 4 days   Patient Care Team:  Morris Mercado PA-C as PCP - General (Physician Assistant)  Fred Lopez MD as Consulting Physician (Cardiology)    Chief Complaint: s/p CABG      Subjective  Reports pain from chest tubes.      Objective     Vital Signs  Temp:  [97.2 °F (36.2 °C)-98 °F (36.7 °C)] 97.2 °F (36.2 °C)  Heart Rate:  [73-89] 82  Resp:  [21] 21  BP: (105-138)/(58-81) 114/67  Body mass index is 32.14 kg/m².    Intake/Output Summary (Last 24 hours) at 12/5/2023 1237  Last data filed at 12/5/2023 1100  Gross per 24 hour   Intake 1742 ml   Output 1925 ml   Net -183 ml     I/O this shift:  In: -   Out: 620 [Urine:550; Chest Tube:70]    Chest tube drainage last 8 hours: 30 cc    Wt Readings from Last 3 Encounters:   12/05/23 93.1 kg (205 lb 3.2 oz)   11/15/23 91.6 kg (202 lb)   08/28/20 92.9 kg (204 lb 12.9 oz)       Flowsheet Rows      Flowsheet Row First Filed Value   Admission Height 170.2 cm (67\") Documented at 12/01/2023 0715   Admission Weight 90.9 kg (200 lb 6.4 oz) Documented at 12/01/2023 0715            Objective:  General Appearance:  In no acute distress, not in pain, comfortable and uncomfortable (Up in chair).    Vital signs: (most recent): Blood pressure 114/67, pulse 82, temperature 97.2 °F (36.2 °C), temperature source Oral, resp. rate 21, height 170.2 cm (67\"), weight 93.1 kg (205 lb 3.2 oz), SpO2 94%.  Vital signs are normal.  No fever.    Output: Producing urine.    Lungs:  Normal effort and normal respiratory rate.  There are decreased breath sounds (bases bilaterally).    Heart: Normal rate.  (HR 80s)  Abdomen: Abdomen is soft.    Extremities: Normal range of motion.  There is no dependent edema.    Neurological: Patient is alert and oriented to person, place and time.    Skin:  Warm and dry.  (Dressing c/d/i)              Results Review:        Results from last 7 days   Lab Units 12/05/23  0520 12/04/23  0310 12/03/23  1705 12/03/23  1053 12/03/23  1023   WBC 10*3/mm3 15.50* " 15.30*  --   --  17.50*   HEMOGLOBIN g/dL 12.3* 12.3*  --   --  13.5   HEMOGLOBIN, POC g/dL  --   --  13.5   < >  --    HEMATOCRIT % 35.6* 36.8*  --   --  40.6   HEMATOCRIT POC %  --   --  40   < >  --    PLATELETS 10*3/mm3 203 198  --   --  196    < > = values in this interval not displayed.         PT/INR:    Protime   Date Value Ref Range Status   12/04/2023 11.5 9.6 - 11.7 Seconds Final   12/03/2023 11.8 (H) 9.6 - 11.7 Seconds Final   /  INR   Date Value Ref Range Status   12/04/2023 1.06 0.93 - 1.10 Final   12/03/2023 1.09 0.93 - 1.10 Final       Results from last 7 days   Lab Units 12/05/23  0520 12/04/23  0310 12/03/23  1023   SODIUM mmol/L 135* 138 138   POTASSIUM mmol/L 3.8 4.4 4.9   CHLORIDE mmol/L 98 105 103   CO2 mmol/L 25.0 24.0 26.0   BUN mg/dL 10 10 10   CREATININE mg/dL 0.64* 0.61* 0.73*   GLUCOSE mg/dL 92 157* 123*   CALCIUM mg/dL 9.2 8.7 9.2         Scheduled Meds:  aspirin, 81 mg, Oral, Daily  atorvastatin, 40 mg, Oral, Nightly  chlorhexidine, 15 mL, Mouth/Throat, Q12H  enoxaparin, 40 mg, Subcutaneous, Daily  folic acid, 1 mg, Oral, Daily  HYDROcodone-acetaminophen, 1 tablet, Oral, Once  insulin lispro, 2-9 Units, Subcutaneous, 4x Daily AC & at Bedtime  metoprolol tartrate, 25 mg, Oral, Q12H  multivitamin with minerals, 1 tablet, Oral, Daily  mupirocin, , Each Nare, BID  pantoprazole, 40 mg, Oral, Daily  polyethylene glycol, 17 g, Oral, BID  senna-docusate sodium, 2 tablet, Oral, BID  thiamine, 100 mg, Oral, Daily        Infusions:         Assessment & Plan    - MV CAD including left main disease, EF 60% (stress test)--s/p CABG x 3 ([place])  - HTN--stable  - HLD--statin  - Preop sinus bradycardia  - Tobacco abuse--35 pack year hx, PFTs noted, cessation d/w pt  - MRSA nasal colonization--mupirocin/ vanc       POD #2. Doing well. Discontinue central line, chest tubes, and dumont catheter. Lasix 20 mg IV once with KCl 20 mEq. Continue supportive care. Encourage IS and ambulation.         DAVID Martinez  12/05/23  12:37 EST

## 2023-12-05 NOTE — SIGNIFICANT NOTE
Continued Stay Note   [place]     Patient Name: John Paul Tineo  MRN: [id number]  Today's Date: 12/5/2023    Admit Date: 12/1/2023    Plan: (P) Plan to discharge home with mother to assist.   Discharge Plan       Row Name 12/05/23 1131       Plan    Plan Plan to discharge home with mother to assist. (P)     Plan Comments DC home withmother to assist.  DC Barriers: POD #2, Central line, chest tube x3, pacer wires, 4L O2, IV Abxs, IV Lasix. (P)                  Expected Discharge Date and Time       Expected Discharge Date Expected Discharge Time    Dec 8, 2023               ADITI Roche RN  [facility]/ICU   O: [phone]  C: [phone]  [email or web address]

## 2023-12-06 ENCOUNTER — APPOINTMENT (OUTPATIENT)
Dept: GENERAL RADIOLOGY | Facility: HOSPITAL | Age: 58
DRG: 234 | End: 2023-12-06
Payer: COMMERCIAL

## 2023-12-06 LAB
ANION GAP SERPL CALCULATED.3IONS-SCNC: 13 MMOL/L (ref 5–15)
BUN SERPL-MCNC: 15 MG/DL (ref 6–20)
BUN/CREAT SERPL: 24.6 (ref 7–25)
CALCIUM SPEC-SCNC: 9.5 MG/DL (ref 8.6–10.5)
CHLORIDE SERPL-SCNC: 95 MMOL/L (ref 98–107)
CO2 SERPL-SCNC: 25 MMOL/L (ref 22–29)
CREAT SERPL-MCNC: 0.61 MG/DL (ref 0.76–1.27)
DEPRECATED RDW RBC AUTO: 46.4 FL (ref 37–54)
EGFRCR SERPLBLD CKD-EPI 2021: 112 ML/MIN/1.73
ERYTHROCYTE [DISTWIDTH] IN BLOOD BY AUTOMATED COUNT: 12.8 % (ref 12.3–15.4)
GLUCOSE BLDC GLUCOMTR-MCNC: 117 MG/DL (ref 70–105)
GLUCOSE BLDC GLUCOMTR-MCNC: 135 MG/DL (ref 70–105)
GLUCOSE BLDC GLUCOMTR-MCNC: 164 MG/DL (ref 70–105)
GLUCOSE BLDC GLUCOMTR-MCNC: 98 MG/DL (ref 70–105)
GLUCOSE SERPL-MCNC: 130 MG/DL (ref 65–99)
HCT VFR BLD AUTO: 37.8 % (ref 37.5–51)
HGB BLD-MCNC: 12.8 G/DL (ref 13–17.7)
MCH RBC QN AUTO: 33.5 PG (ref 26.6–33)
MCHC RBC AUTO-ENTMCNC: 33.9 G/DL (ref 31.5–35.7)
MCV RBC AUTO: 98.9 FL (ref 79–97)
PLATELET # BLD AUTO: 258 10*3/MM3 (ref 140–450)
PMV BLD AUTO: 9.6 FL (ref 6–12)
POTASSIUM SERPL-SCNC: 3.6 MMOL/L (ref 3.5–5.2)
RBC # BLD AUTO: 3.82 10*6/MM3 (ref 4.14–5.8)
SODIUM SERPL-SCNC: 133 MMOL/L (ref 136–145)
WBC NRBC COR # BLD AUTO: 13.2 10*3/MM3 (ref 3.4–10.8)

## 2023-12-06 PROCEDURE — 25010000002 ENOXAPARIN PER 10 MG: Performed by: NURSE PRACTITIONER

## 2023-12-06 PROCEDURE — 25010000002 KETOROLAC TROMETHAMINE PER 15 MG

## 2023-12-06 PROCEDURE — 97110 THERAPEUTIC EXERCISES: CPT

## 2023-12-06 PROCEDURE — 82948 REAGENT STRIP/BLOOD GLUCOSE: CPT

## 2023-12-06 PROCEDURE — 80048 BASIC METABOLIC PNL TOTAL CA: CPT | Performed by: NURSE PRACTITIONER

## 2023-12-06 PROCEDURE — 85027 COMPLETE CBC AUTOMATED: CPT | Performed by: NURSE PRACTITIONER

## 2023-12-06 PROCEDURE — 97116 GAIT TRAINING THERAPY: CPT

## 2023-12-06 PROCEDURE — 97530 THERAPEUTIC ACTIVITIES: CPT

## 2023-12-06 PROCEDURE — 97535 SELF CARE MNGMENT TRAINING: CPT

## 2023-12-06 PROCEDURE — 99232 SBSQ HOSP IP/OBS MODERATE 35: CPT | Performed by: INTERNAL MEDICINE

## 2023-12-06 PROCEDURE — 71045 X-RAY EXAM CHEST 1 VIEW: CPT

## 2023-12-06 PROCEDURE — 63710000001 INSULIN LISPRO (HUMAN) PER 5 UNITS: Performed by: PHYSICIAN ASSISTANT

## 2023-12-06 RX ORDER — KETOROLAC TROMETHAMINE 15 MG/ML
15 INJECTION, SOLUTION INTRAMUSCULAR; INTRAVENOUS ONCE
Status: DISCONTINUED | OUTPATIENT
Start: 2023-12-06 | End: 2023-12-08

## 2023-12-06 RX ORDER — POTASSIUM CHLORIDE 20 MEQ/1
20 TABLET, EXTENDED RELEASE ORAL ONCE
Status: COMPLETED | OUTPATIENT
Start: 2023-12-06 | End: 2023-12-06

## 2023-12-06 RX ORDER — BISACODYL 10 MG
10 SUPPOSITORY, RECTAL RECTAL ONCE
Status: COMPLETED | OUTPATIENT
Start: 2023-12-06 | End: 2023-12-06

## 2023-12-06 RX ORDER — BUMETANIDE 0.25 MG/ML
1 INJECTION INTRAMUSCULAR; INTRAVENOUS ONCE
Status: COMPLETED | OUTPATIENT
Start: 2023-12-06 | End: 2023-12-06

## 2023-12-06 RX ORDER — BUMETANIDE 0.25 MG/ML
1 INJECTION INTRAMUSCULAR; INTRAVENOUS ONCE
Status: DISCONTINUED | OUTPATIENT
Start: 2023-12-06 | End: 2023-12-06

## 2023-12-06 RX ORDER — FUROSEMIDE 20 MG/1
20 TABLET ORAL DAILY
Status: DISCONTINUED | OUTPATIENT
Start: 2023-12-07 | End: 2023-12-08 | Stop reason: HOSPADM

## 2023-12-06 RX ORDER — POTASSIUM CHLORIDE 750 MG/1
10 TABLET, FILM COATED, EXTENDED RELEASE ORAL DAILY
Status: DISCONTINUED | OUTPATIENT
Start: 2023-12-07 | End: 2023-12-08 | Stop reason: HOSPADM

## 2023-12-06 RX ADMIN — DOCUSATE SODIUM 50 MG AND SENNOSIDES 8.6 MG 2 TABLET: 8.6; 5 TABLET, FILM COATED ORAL at 08:34

## 2023-12-06 RX ADMIN — HYDROCODONE BITARTRATE AND ACETAMINOPHEN 1 TABLET: 5; 325 TABLET ORAL at 12:47

## 2023-12-06 RX ADMIN — PANTOPRAZOLE SODIUM 40 MG: 40 TABLET, DELAYED RELEASE ORAL at 08:34

## 2023-12-06 RX ADMIN — 0.12% CHLORHEXIDINE GLUCONATE 15 ML: 1.2 RINSE ORAL at 08:33

## 2023-12-06 RX ADMIN — FOLIC ACID 1 MG: 1 TABLET ORAL at 08:34

## 2023-12-06 RX ADMIN — POLYETHYLENE GLYCOL 3350 17 G: 17 POWDER, FOR SOLUTION ORAL at 20:38

## 2023-12-06 RX ADMIN — INSULIN LISPRO 2 UNITS: 100 INJECTION, SOLUTION INTRAVENOUS; SUBCUTANEOUS at 08:34

## 2023-12-06 RX ADMIN — ATORVASTATIN CALCIUM 40 MG: 40 TABLET, FILM COATED ORAL at 20:38

## 2023-12-06 RX ADMIN — Medication 100 MG: at 08:33

## 2023-12-06 RX ADMIN — METOPROLOL TARTRATE 25 MG: 25 TABLET, FILM COATED ORAL at 08:34

## 2023-12-06 RX ADMIN — ASPIRIN 81 MG: 81 TABLET, COATED ORAL at 08:34

## 2023-12-06 RX ADMIN — HYDROCODONE BITARTRATE AND ACETAMINOPHEN 1 TABLET: 5; 325 TABLET ORAL at 00:05

## 2023-12-06 RX ADMIN — METOPROLOL TARTRATE 37.5 MG: 25 TABLET, FILM COATED ORAL at 20:38

## 2023-12-06 RX ADMIN — POLYETHYLENE GLYCOL 3350 17 G: 17 POWDER, FOR SOLUTION ORAL at 08:33

## 2023-12-06 RX ADMIN — ENOXAPARIN SODIUM 40 MG: 100 INJECTION SUBCUTANEOUS at 16:48

## 2023-12-06 RX ADMIN — 0.12% CHLORHEXIDINE GLUCONATE 15 ML: 1.2 RINSE ORAL at 20:37

## 2023-12-06 RX ADMIN — HYDROCODONE BITARTRATE AND ACETAMINOPHEN 1 TABLET: 5; 325 TABLET ORAL at 20:38

## 2023-12-06 RX ADMIN — HYDROCODONE BITARTRATE AND ACETAMINOPHEN 1 TABLET: 5; 325 TABLET ORAL at 16:47

## 2023-12-06 RX ADMIN — MUPIROCIN: 20 OINTMENT TOPICAL at 08:34

## 2023-12-06 RX ADMIN — BISACODYL 10 MG: 10 SUPPOSITORY RECTAL at 14:47

## 2023-12-06 RX ADMIN — MUPIROCIN: 20 OINTMENT TOPICAL at 21:24

## 2023-12-06 RX ADMIN — Medication 12.5 MG: at 12:47

## 2023-12-06 RX ADMIN — DOCUSATE SODIUM 50 MG AND SENNOSIDES 8.6 MG 2 TABLET: 8.6; 5 TABLET, FILM COATED ORAL at 20:38

## 2023-12-06 RX ADMIN — POTASSIUM CHLORIDE 20 MEQ: 1500 TABLET, EXTENDED RELEASE ORAL at 13:06

## 2023-12-06 RX ADMIN — BUMETANIDE 1 MG: 0.25 INJECTION INTRAMUSCULAR; INTRAVENOUS at 14:47

## 2023-12-06 RX ADMIN — HYDROCODONE BITARTRATE AND ACETAMINOPHEN 1 TABLET: 5; 325 TABLET ORAL at 08:33

## 2023-12-06 RX ADMIN — Medication 1 TABLET: at 08:34

## 2023-12-06 RX ADMIN — KETOROLAC TROMETHAMINE 15 MG: 15 INJECTION, SOLUTION INTRAMUSCULAR; INTRAVENOUS at 14:47

## 2023-12-06 RX ADMIN — HYDROCODONE BITARTRATE AND ACETAMINOPHEN 1 TABLET: 5; 325 TABLET ORAL at 04:02

## 2023-12-06 NOTE — PROGRESS NOTES
" LOS: 5 days   Patient Care Team:  Morris Mercado PA-C as PCP - General (Physician Assistant)  Fred Lopez MD as Consulting Physician (Cardiology)    Chief Complaint: s/p CABG x 3 (LIMA to LAD, SVG to OM, SVG to RCA), EVH of the right leg    Subjective:  Symptoms:  No shortness of breath or chest pain.    Diet:  Adequate intake.  No nausea or vomiting.    Activity level: Impaired due to pain.    Pain:  He complains of pain that is mild.  He reports pain is improving.  Pain is partially controlled and requiring pain medication.      Reports pain with breathing chest below his left scapula.  Reports pain is better than yesterday.  He is passing flatus.  No BM yet.    Objective     Vital Signs  Temp:  [97.4 °F (36.3 °C)-99.1 °F (37.3 °C)] 98.6 °F (37 °C)  Heart Rate:  [] 95  Resp:  [18-36] 20  BP: (103-126)/(62-92) 116/70  Body mass index is 32.01 kg/m².    Intake/Output Summary (Last 24 hours) at 12/6/2023 1038  Last data filed at 12/6/2023 0824  Gross per 24 hour   Intake 960 ml   Output 1220 ml   Net -260 ml     I/O this shift:  In: 240 [P.O.:240]  Out: -       Wt Readings from Last 3 Encounters:   12/06/23 92.7 kg (204 lb 5.9 oz)   11/15/23 91.6 kg (202 lb)   08/28/20 92.9 kg (204 lb 12.9 oz)       Flowsheet Rows      Flowsheet Row First Filed Value   Admission Height 170.2 cm (67\") Documented at 12/01/2023 0715   Admission Weight 90.9 kg (200 lb 6.4 oz) Documented at 12/01/2023 0715            Objective:  General Appearance:  In no acute distress and comfortable (Up in chair).    Vital signs: (most recent): Blood pressure 114/70, pulse 94, temperature 97.9 °F (36.6 °C), temperature source Oral, resp. rate (!) 35, height 170.2 cm (67\"), weight 92.7 kg (204 lb 5.9 oz), SpO2 94%.  Vital signs are normal.  No fever.    Output: Producing urine.    Lungs:  Normal effort and normal respiratory rate.  There are decreased breath sounds (bases bilaterally).  (On 2 L NC)  Heart: Normal rate.  Regular rhythm.  " (HR 90s)  Abdomen: Abdomen is soft and non-distended.  Bowel sounds are normal.     Extremities: Normal range of motion.  There is no dependent edema.    Neurological: Patient is alert and oriented to person, place and time.    Skin:  Warm and dry.  (Sternal incision with surgical dressing in place, CDI )          Results Review:        Results from last 7 days   Lab Units 12/06/23  0757 12/05/23  0520 12/04/23  0310   WBC 10*3/mm3 13.20* 15.50* 15.30*   HEMOGLOBIN g/dL 12.8* 12.3* 12.3*   HEMATOCRIT % 37.8 35.6* 36.8*   PLATELETS 10*3/mm3 258 203 198         PT/INR:    Protime   Date Value Ref Range Status   12/04/2023 11.5 9.6 - 11.7 Seconds Final   /  INR   Date Value Ref Range Status   12/04/2023 1.06 0.93 - 1.10 Final       Results from last 7 days   Lab Units 12/05/23  1335 12/05/23  0520 12/04/23  0310 12/03/23  1023   SODIUM mmol/L  --  135* 138 138   POTASSIUM mmol/L 4.1 3.8 4.4 4.9   CHLORIDE mmol/L  --  98 105 103   CO2 mmol/L  --  25.0 24.0 26.0   BUN mg/dL  --  10 10 10   CREATININE mg/dL  --  0.64* 0.61* 0.73*   GLUCOSE mg/dL  --  92 157* 123*   CALCIUM mg/dL  --  9.2 8.7 9.2         Scheduled Meds:  aspirin, 81 mg, Oral, Daily  atorvastatin, 40 mg, Oral, Nightly  bisacodyl, 10 mg, Rectal, Once  bumetanide, 1 mg, Intravenous, Once  chlorhexidine, 15 mL, Mouth/Throat, Q12H  enoxaparin, 40 mg, Subcutaneous, Daily  folic acid, 1 mg, Oral, Daily  HYDROcodone-acetaminophen, 1 tablet, Oral, Once  insulin lispro, 2-9 Units, Subcutaneous, 4x Daily AC & at Bedtime  metoprolol tartrate, 12.5 mg, Oral, Once  metoprolol tartrate, 37.5 mg, Oral, Q12H  multivitamin with minerals, 1 tablet, Oral, Daily  mupirocin, , Each Nare, BID  pantoprazole, 40 mg, Oral, Daily  polyethylene glycol, 17 g, Oral, BID  potassium chloride, 20 mEq, Oral, Once  senna-docusate sodium, 2 tablet, Oral, BID  thiamine, 100 mg, Oral, Daily             Assessment & Plan    - MV CAD including left main disease, EF 60% (stress test)--s/p CABG x  3 (LIMA to LAD, SVG to OM, SVG to RCA), EVH of the right leg on 12/3 ([place])  - HTN--stable  - HLD--statin  - Preop sinus bradycardia  - Tobacco abuse--35 pack year hx, PFTs noted, cessation d/w pt  - MRSA nasal colonization--mupirocin/ vanc       POD #3.  Overall doing better.  He continues to have some pain with inspiration just under his left scapula.  Chest x-ray this morning shows an increase in left-sided pleural effusion.  He is down 1 kg from yesterday, up 2 kg from preop, he remains on supplemental oxygen.  We will give 1 dose of IV Bumex this morning and reevaluate this afternoon, replete electrolytes.  His BP remains stable, heart rate in the upper 90s, we will increase his beta-blocker today.  Discontinue wires today. He is passing flatus, no BM yet, we will give him a suppository today.     Encourage mobilization, pulmonary toilet  Encourage PT/OT  On aspirin/statin/beta-blocker  Anticipate discharge in 1 to 2 days    ADDENDUM:  Rounded with Dr. Nicole, we will give 1 dose of 15 mg IV Toradol this afternoon to help with pain.    Bernice Delong, APRN  12/06/23  10:38 EST

## 2023-12-06 NOTE — PLAN OF CARE
Assessment: John Paul Tineo presents with ADL impairments affecting function including endurance / activity tolerance and pain. Demonstrated functioning below baseline abilities indicate the need for continued skilled intervention while inpatient. Tolerating session today without incident. Patient has made good progress thus far, ambulating further distance this date as well as performing toileting with just supervision. He is anticipated to continue making good progress through cardiac levels. Will continue to follow and progress as tolerated.      Plan/Recommendations:   No ongoing therapy recommended post-acute care. No therapy needs.. Pt requires no DME at discharge.      Pt desires Home with family assist at discharge. Pt cooperative; agreeable to therapeutic recommendations and plan of care.

## 2023-12-06 NOTE — THERAPY TREATMENT NOTE
Subjective: Pt agreeable to therapeutic plan of care.  Cognition: oriented to Person, Place, Time, and Situation    Objective:     Bed Mobility: N/A or Not attempted.   Functional Transfers: SBA and with rolling walker and proper use of heart hugger     Balance: static, dynamic, and standing SBA and with rolling walker  Functional Ambulation: SBA and with rolling walker amb 200 feet    Toileting: Supervision  ADL Position: supported sitting  ADL Comments: Patient performed toileting task requiring supervision for all portions    Phase 1 Cardiac Rehab Initiated    Sitting tolerance: >10min  Standing tolerance: 5-10min    Precautions:  Mid-sternal incision; avoid scapular retraction and depression.  Cardiovascular impairment post-sx; encourage energy conservation strategies. Patient able to recall 1/3, requiring cues for remainder of precautions.    Therapeutic Exercise: 10 reps UE and LE AROM in supported sitting    MET level equivalent: 2.0-3.0 (Unlimited sitting, ambulation on level ground <2mph, light housework)      Vitals: WNL    Pain: 3 VAS  Location: Chest with deep breath  Interventions for pain: Repositioned and Increased Activity  Education: Provided education on the importance of mobility in the acute care setting, Verbal/Tactile Cues, ADL training, Transfer Training, and Post-Op Precautions      Assessment: John Paul Tineo presents with ADL impairments affecting function including endurance / activity tolerance and pain. Demonstrated functioning below baseline abilities indicate the need for continued skilled intervention while inpatient. Tolerating session today without incident. Patient has made good progress thus far, ambulating further distance this date as well as performing toileting with just supervision. He is anticipated to continue making good progress through cardiac levels. Will continue to follow and progress as tolerated.     Plan/Recommendations:   No ongoing therapy recommended post-acute  care. No therapy needs.. Pt requires no DME at discharge.     Pt desires Home with family assist at discharge. Pt cooperative; agreeable to therapeutic recommendations and plan of care.     Modified [place]: N/A = No pre-op stroke/TIA    Post-Tx Position: Up in Chair and Call light and personal items within reach  PPE: gloves

## 2023-12-06 NOTE — PROGRESS NOTES
CARDIOLOGY PROGRESS NOTE:    John Paul Tineo  58 y.o.  male  1965  [id number]      Referring Provider: Cardiac surgeon    Reason for follow-up: Coronary artery disease     Patient Care Team:  Morris Mercado PA-C as PCP - General (Physician Assistant)  Fred Lopez MD as Consulting Physician (Cardiology)    Subjective .  No chest pain or shortness of breath.  Ambulating well    Objective i sitting in chair comfortably     Review of Systems   Constitutional: Negative for fever and malaise/fatigue.   HENT:  Negative for ear pain and nosebleeds.    Eyes:  Negative for blurred vision and double vision.   Cardiovascular:  Negative for chest pain, dyspnea on exertion and palpitations.   Respiratory:  Negative for cough and shortness of breath.    Skin:  Negative for rash.   Musculoskeletal:  Negative for joint pain.   Gastrointestinal:  Negative for abdominal pain, nausea and vomiting.   Neurological:  Negative for focal weakness and headaches.   Psychiatric/Behavioral:  Negative for depression. The patient is not nervous/anxious.    All other systems reviewed and are negative.      Allergies: Patient has no known allergies.    Scheduled Meds:aspirin, 81 mg, Oral, Daily  atorvastatin, 40 mg, Oral, Nightly  bisacodyl, 10 mg, Rectal, Once  bumetanide, 1 mg, Intravenous, Once  chlorhexidine, 15 mL, Mouth/Throat, Q12H  enoxaparin, 40 mg, Subcutaneous, Daily  folic acid, 1 mg, Oral, Daily  HYDROcodone-acetaminophen, 1 tablet, Oral, Once  insulin lispro, 2-9 Units, Subcutaneous, 4x Daily AC & at Bedtime  metoprolol tartrate, 12.5 mg, Oral, Once  metoprolol tartrate, 37.5 mg, Oral, Q12H  multivitamin with minerals, 1 tablet, Oral, Daily  mupirocin, , Each Nare, BID  pantoprazole, 40 mg, Oral, Daily  polyethylene glycol, 17 g, Oral, BID  potassium chloride, 20 mEq, Oral, Once  senna-docusate sodium, 2 tablet, Oral, BID  thiamine, 100 mg, Oral, Daily      Continuous Infusions:     PRN Meds:.  acetaminophen **OR**  "acetaminophen **OR** acetaminophen    Calcium Replacement - Follow Nurse / BPA Driven Protocol    cyclobenzaprine    dextrose    dextrose    glucagon (human recombinant)    HYDROcodone-acetaminophen    ketorolac    LORazepam **OR** LORazepam **OR** LORazepam **OR** LORazepam **OR** LORazepam **OR** LORazepam    Magnesium Cardiology Dose Replacement - Follow Nurse / BPA Driven Protocol    [] Morphine **AND** naloxone    nitroglycerin    ondansetron    Phosphorus Replacement - Follow Nurse / BPA Driven Protocol    Potassium Replacement - Follow Nurse / BPA Driven Protocol        VITAL SIGNS  Vitals:    23 0824 23 0833 23 0900 23 1000   BP:  115/66 115/76 116/70   BP Location:       Patient Position:       Pulse:  102 99 95   Resp:       Temp: 98.6 °F (37 °C)      TempSrc: Oral      SpO2:   90% 90%   Weight:       Height:           Flowsheet Rows      Flowsheet Row First Filed Value   Admission Height 170.2 cm (67\") Documented at 202315   Admission Weight 90.9 kg (200 lb 6.4 oz) Documented at 2023 0715             TELEMETRY: Ventricular pacemaker rhythm    Physical Exam:  Constitutional:       Appearance: Well-developed.   Eyes:      General: No scleral icterus.     Conjunctiva/sclera: Conjunctivae normal.      Pupils: Pupils are equal, round, and reactive to light.   HENT:      Head: Normocephalic and atraumatic.   Neck:      Vascular: No carotid bruit or JVD.   Pulmonary:      Effort: Pulmonary effort is normal.      Breath sounds: Normal breath sounds. No wheezing. No rales.   Cardiovascular:      Normal rate. Regular rhythm.   Pulses:     Intact distal pulses.   Abdominal:      General: Bowel sounds are normal.      Palpations: Abdomen is soft.   Musculoskeletal: Normal range of motion.      Cervical back: Normal range of motion and neck supple. Skin:     General: Skin is warm and dry.      Findings: No rash.   Neurological:      Mental Status: Alert.      Comments: No " focal deficits          Results Review:   I reviewed the patient's new clinical results.  Lab Results (last 24 hours)       Procedure Component Value Units Date/Time    CBC (No Diff) [[id number]]  (Abnormal) Collected: 12/06/23 0757    Specimen: Blood Updated: 12/06/23 0838     WBC 13.20 10*3/mm3      RBC 3.82 10*6/mm3      Hemoglobin 12.8 g/dL      Hematocrit 37.8 %      MCV 98.9 fL      MCH 33.5 pg      MCHC 33.9 g/dL      RDW 12.8 %      RDW-SD 46.4 fl      MPV 9.6 fL      Platelets 258 10*3/mm3     Basic Metabolic Panel [[id number]] Collected: 12/06/23 0757    Specimen: Blood Updated: 12/06/23 0832    POC Glucose Once [[id number]]  (Abnormal) Collected: 12/06/23 0822    Specimen: Blood Updated: 12/06/23 0824     Glucose 164 mg/dL      Comment: Serial Number: [id number]:  [id number]       POC Glucose Once [[id number]]  (Abnormal) Collected: 12/05/23 2018    Specimen: Blood Updated: 12/05/23 2020     Glucose 120 mg/dL      Comment: Serial Number: [id number]:  [id number]       POC Glucose Once [[id number]]  (Abnormal) Collected: 12/05/23 1637    Specimen: Blood Updated: 12/05/23 1641     Glucose 121 mg/dL      Comment: Serial Number: [id number]:  [id number]       Potassium [[id number]]  (Normal) Collected: 12/05/23 1335    Specimen: Blood Updated: 12/05/23 1358     Potassium 4.1 mmol/L     POC Glucose Once [[id number]]  (Normal) Collected: 12/05/23 1247    Specimen: Blood Updated: 12/05/23 1252     Glucose 102 mg/dL      Comment: Serial Number: [id number]:  [id number]               Imaging Results (Last 24 Hours)       Procedure Component Value Units Date/Time    XR Chest 1 View [[id number]] Collected: 12/06/23 0746     Updated: 12/06/23 0751    Narrative:      XR CHEST 1 VW    Date of Exam: 12/6/2023 4:05 AM EST    Indication: increased pain in left shoulder after chest tube removal    Comparison: 12/5/2023 and prior    Findings:  Study limited by overlying support and monitoring apparatus. Lung  volumes are low. Patient is status post median sternotomy. Epicardial pacing wires overlie the base of the chest. Heart size is mildly prominent, accentuated by technique. There is mild   pulm vascular congestion and vascular crowding. Dependent opacities at the lung bases compatible with small to moderate pleural effusion on the left and associated dependent consolidation likely atelectasis. Mild pleural parenchymal changes also noted on   the right. No definite pneumothorax or pneumomediastinum noted. Findings are similar to minimally progressed from the comparison      Impression:      Impression:    1. Postsurgical changes from recent cardiac surgery noted with slight increase in size of left greater than right pleural effusions and dependent atelectasis      Electronically Signed: Delvin Wen MD    12/6/2023 7:49 AM EST    Workstation ID: [id number]    XR Chest 1 View [[id number]] Collected: 12/05/23 1335     Updated: 12/05/23 1339    Narrative:      XR CHEST 1 VW    Date of Exam: 12/5/2023 1:17 PM EST    Indication: Chest tube removal    Comparison: 12/5/2023 at 2:28 a.m.    Findings:  In the interval the right internal jugular introducer sheath has been removed. The left basilar thoracostomy tube has also been removed. Patient is again noted be status post median sternotomy. There is mild cardiomegaly. Pulmonary vessels remain   indistinct consistent with pulmonary vascular congestion. Lung volumes are low with persistent left basilar airspace disease likely due to atelectasis. There is no evidence of pneumothorax. Small left pleural effusion is unchanged. No right pleural   effusion.      Impression:      Impression:    1. Interval removal of left thoracostomy tube and right internal jugular introducer sheath.  2. No evidence pneumothorax.  3. No change in left basilar airspace disease and small left pleural effusion.      Electronically Signed: Mg Tran MD    12/5/2023 1:37 PM EST    Workstation ID:  [id number]            EKG      I personally viewed and interpreted the patient's EKG/Telemetry data:    ECHOCARDIOGRAM:  Results for orders placed during the hospital encounter of 12/01/23    Adult Transthoracic Echo Complete w/ Color, Spectral and Contrast if Necessary Per Protocol    Interpretation Summary    Left ventricular ejection fraction appears to be 61 - 65%.    Saline test results are negative.       STRESS MYOVIEW:  Results for orders placed during the hospital encounter of 11/28/23    Stress Test With Myocardial Perfusion One Day    Interpretation Summary    Left ventricular ejection fraction is normal (Calculated EF = 60%).    High risk for ischemic heart disease.    Myocardial perfusion imaging indicates a large-sized, severe area of ischemia located in the anterior wall, apex and septal wall.    Impressions are consistent with a high risk study.       CARDIAC CATHETERIZATION:  Results for orders placed during the hospital encounter of 12/01/23    Cardiac Catheterization/Vascular Study       OTHER:         Assessment & Plan     Angina with abnormal Myoview  Coronary artery disease  Patient had chest pain with risk factors for coronary disease and had an abnormal Myoview and hence he underwent cardiac catheterization  Patient had significant left main and three-vessel coronary disease and is scheduled for coronary bypass surgery  Cardiac surgeons have seen the patient is having workup done for pre-CABG.  Patient had an echocardiogram which showed normal LV systolic function  Patient is currently stable on medical therapy with aspirin statins and beta-blockers  Patient had a positive MRSA screen.  Patient had a vein mapping which is adequate on carotid duplex which is normal and his COVID screen is negative.  He also has PFTs which were within normal limits.  Patient underwent coronary artery bypass surgery x 3 vessels with a LIMA to LAD and SVG to the marginal branch and SVG to the RCA without any  complications  Patient is extubated and sitting in chair  Patient's chest tubes are removed.  Patient's Dodson catheter has been removed  Patient is ambulating very well and will be discharged home soon    Hypertension  Patient is currently on beta-blockers and tolerating very well    Hyperlipidemia  Patient is on Crestor and his lipid levels are followed by the primary care doctor    Home soon.    I discussed the patients findings and my recommendations with patient and nurse    Fred Lopez MD  12/06/23  11:31 EST

## 2023-12-06 NOTE — PLAN OF CARE
Problem: Adult Inpatient Plan of Care  Goal: Plan of Care Review  Outcome: Ongoing, Progressing  Goal: Patient-Specific Goal (Individualized)  Outcome: Ongoing, Progressing  Goal: Absence of Hospital-Acquired Illness or Injury  Outcome: Ongoing, Progressing  Intervention: Identify and Manage Fall Risk  Recent Flowsheet Documentation  Taken 12/6/2023 0400 by Yael Snyder RN  Safety Promotion/Fall Prevention: safety round/check completed  Taken 12/6/2023 0300 by Yael Snyder RN  Safety Promotion/Fall Prevention: safety round/check completed  Taken 12/6/2023 0200 by Yael Snyder RN  Safety Promotion/Fall Prevention: safety round/check completed  Taken 12/6/2023 0100 by Yael Snyder RN  Safety Promotion/Fall Prevention: safety round/check completed  Taken 12/6/2023 0000 by Yael Snyder RN  Safety Promotion/Fall Prevention: safety round/check completed  Taken 12/5/2023 2300 by Yael Snyder RN  Safety Promotion/Fall Prevention: safety round/check completed  Taken 12/5/2023 2200 by Yael Snyder RN  Safety Promotion/Fall Prevention: safety round/check completed  Taken 12/5/2023 2100 by Yael Snyder RN  Safety Promotion/Fall Prevention: safety round/check completed  Taken 12/5/2023 2000 by Yael Snyder RN  Safety Promotion/Fall Prevention: safety round/check completed  Taken 12/5/2023 1900 by Yael Snyder RN  Safety Promotion/Fall Prevention: safety round/check completed  Intervention: Prevent Skin Injury  Recent Flowsheet Documentation  Taken 12/6/2023 0300 by Yael Snyder RN  Body Position: position changed independently  Taken 12/6/2023 0100 by Yael Snyder RN  Body Position: position changed independently  Taken 12/5/2023 2300 by Yael Snyder RN  Body Position: position changed independently  Taken 12/5/2023 2100 by Yael Snyder RN  Body Position: position changed independently  Taken 12/5/2023 2000 by Yael Snyder RN  Skin Protection:    adhesive use limited   skin-to-skin areas padded   skin-to-device areas padded   silicone foam dressing in place   pulse oximeter probe site changed   protective footwear used  Taken 12/5/2023 1900 by Yael Snyder, RN  Body Position: position changed independently  Goal: Optimal Comfort and Wellbeing  Outcome: Ongoing, Progressing  Intervention: Monitor Pain and Promote Comfort  Recent Flowsheet Documentation  Taken 12/6/2023 0000 by Yael Snyder, RN  Pain Management Interventions: see MAR  Taken 12/5/2023 2000 by Yael Snyder RN  Pain Management Interventions: see MAR  Intervention: Provide Person-Centered Care  Recent Flowsheet Documentation  Taken 12/6/2023 0400 by Yael Snyder, RN  Trust Relationship/Rapport:   care explained   emotional support provided   choices provided   empathic listening provided   questions answered   questions encouraged   reassurance provided   thoughts/feelings acknowledged  Taken 12/6/2023 0000 by Yael Snyder, RN  Trust Relationship/Rapport:   care explained   choices provided   emotional support provided   empathic listening provided   questions answered   questions encouraged   reassurance provided   thoughts/feelings acknowledged  Taken 12/5/2023 2000 by Yael Snyder, RN  Trust Relationship/Rapport:   care explained   choices provided   emotional support provided   questions answered   empathic listening provided   questions encouraged   reassurance provided   thoughts/feelings acknowledged  Goal: Readiness for Transition of Care  Outcome: Ongoing, Progressing   Goal Outcome Evaluation:

## 2023-12-06 NOTE — THERAPY TREATMENT NOTE
Subjective: Pt agreeable to therapeutic plan of care.     Objective:   Pt recalled 3/3 sternal precautions.    Bed mobility - N/A or Not attempted.  Transfers - SBA with cues for use of heart hugger  Ambulation - 300 feet CGA  Phase 1 Cardiac Rehab Initiated    Sitting tolerance: >10min and supported  Standing tolerance: 1-5min and unsupported    Precautions:  Mid-sternal incision; avoid scapular retraction and depression.  Cardiovascular impairment post-sx; encourage energy conservation strategies.    Therapeutic Exercise: 10 reps UE and LE AROM in supported sitting    MET level equivalent: 2.0-3.0 (Unlimited sitting, ambulation on level ground <2mph, light housework)   Vitals: Desaturates on room air to 85% at lowest    Pain: 2 VAS   Location: sternum  Intervention for pain: Repositioned    Education: Provided education on the importance of mobility in the acute care setting, Verbal/Tactile Cues, Transfer Training, Gait Training, Energy conservation strategies, and Post-Op Precautions    Assessment: John Paul Tineo presents with functional mobility impairments which indicate the need for skilled intervention. PT encouraging pt to use IS. Pt has shallow breaths. Pt desats to 85% on room air with less than 30 second recovery time with rest after gait. Pt making steady functional gains; he was steady with gait without walker this PM. Tolerating session today without incident. Will continue to follow and progress as tolerated.     Plan/Recommendations:   No ongoing therapy recommended post-acute care. No therapy needs.. Pt requires no DME at discharge.     Pt desires Home with family assist at discharge. Pt cooperative; agreeable to therapeutic recommendations and plan of care.         Basic Mobility 6-click:  Rollin = Total, A lot = 2, A little = 3; 4 = None  Supine>Sit:   1 = Total, A lot = 2, A little = 3; 4 = None   Sit>Stand with arms:  1 = Total, A lot = 2, A little = 3; 4 = None  Bed>Chair:   1 =  Total, A lot = 2, A little = 3; 4 = None  Ambulate in room:  1 = Total, A lot = 2, A little = 3; 4 = None  3-5 Steps with railin = Total, A lot = 2, A little = 3; 4 = None  Score: 22    Modified Dimas: N/A = No pre-op stroke/TIA    Post-Tx Position: Up in Chair, Alarms activated, and Call light and personal items within reach  PPE: gloves and gown

## 2023-12-06 NOTE — PLAN OF CARE
Goal Outcome Evaluation:  Plan of Care Reviewed With: patient     Assessment: John Paul Tineo presents with functional mobility impairments which indicate the need for skilled intervention. PT encouraging pt to use IS. Pt has shallow breaths. Pt desats to 85% on room air with less than 30 second recovery time with rest after gait. Pt making steady functional gains; he was steady with gait without walker this PM. Tolerating session today without incident. Will continue to follow and progress as tolerated.     Plan/Recommendations:   No ongoing therapy recommended post-acute care. No therapy needs.. Pt requires no DME at discharge.     Pt desires Home with family assist at discharge. Pt cooperative; agreeable to therapeutic recommendations and plan of care.          Anticipated Discharge Disposition (PT): home with assist

## 2023-12-06 NOTE — PAYOR COMM NOTE
"This is clinical update for John Paul Vital  Reference/Auth#: [id number]     EXTENDED AUTHORIZATION PENDING:     Please call or fax determination to contact below.   Thank you.    Martha Smith RN, BSN  Utilization Review Nurse  [facility]  Direct & confidential phone # [phone]  Fax # [phone]        John Paul Vital (58 y.o. Male)       Date of Birth   1965    Social Security Number       Address   [address] [place] [address] [place] [address]    Home Phone   [phone]    MRN   [id number]       Mandaeism   None    Marital Status                               Admission Date   12/1/23    Admission Type   Elective    Admitting Provider   Fred Lopez MD    Attending Provider   Jeffery Salmon MD    Department, Room/Bed   [facility], 2210/1       Discharge Date       Discharge Disposition       Discharge Destination                                 Attending Provider: Jeffery Salmon MD    Allergies: No Known Allergies    Isolation: Contact   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)   Code Status: CPR    Ht: 170.2 cm (67\")   Wt: 92.7 kg (204 lb 5.9 oz)    Admission Cmt: None   Principal Problem: CAD (coronary artery disease) [I25.10]                   Active Insurance as of 12/1/2023       Primary Coverage       Payor Plan Insurance Group Employer/Plan Group    [employer] PPO [id number]       Payor Plan Address Payor Plan Phone Number Payor Plan Fax Number Effective Dates    [address]   10/1/2023 - None Entered    [place] [address]         Subscriber Name Subscriber Birth Date Member ID       JOHN PAUL VITAL 1965 [id number]                     Emergency Contacts        (Rel.) Home Phone Work Phone Mobile Phone    LUPILLO VITAL (Mother) -- -- [phone]    GUSTAVO VITAL (Daughter) -- -- [phone]              Operative/Procedure Notes (last 48 hours)  Notes from 12/04/23 " 0928 through 12/06/23 0928   No notes of this type exist for this encounter.          Physician Progress Notes (last 48 hours)        Fred Lopez MD at 12/05/23 1600          CARDIOLOGY PROGRESS NOTE:    John Paul Tineo  58 y.o.  male  1965  [id number]      Referring Provider: Cardiac surgeon    Reason for follow-up: Coronary artery disease     Patient Care Team:  Morris Mercado PA-C as PCP - General (Physician Assistant)  Fred Lopez MD as Consulting Physician (Cardiology)    Subjective .  No chest pain or shortness of breath.  Chest tubes are out.    Objective i sitting in chair comfortably     Review of Systems   Constitutional: Negative for fever and malaise/fatigue.   HENT:  Negative for ear pain and nosebleeds.    Eyes:  Negative for blurred vision and double vision.   Cardiovascular:  Negative for chest pain, dyspnea on exertion and palpitations.   Respiratory:  Negative for cough and shortness of breath.    Skin:  Negative for rash.   Musculoskeletal:  Negative for joint pain.   Gastrointestinal:  Negative for abdominal pain, nausea and vomiting.   Neurological:  Negative for focal weakness and headaches.   Psychiatric/Behavioral:  Negative for depression. The patient is not nervous/anxious.    All other systems reviewed and are negative.      Allergies: Patient has no known allergies.    Scheduled Meds:aspirin, 81 mg, Oral, Daily  atorvastatin, 40 mg, Oral, Nightly  chlorhexidine, 15 mL, Mouth/Throat, Q12H  enoxaparin, 40 mg, Subcutaneous, Daily  folic acid, 1 mg, Oral, Daily  HYDROcodone-acetaminophen, 1 tablet, Oral, Once  insulin lispro, 2-9 Units, Subcutaneous, 4x Daily AC & at Bedtime  metoprolol tartrate, 25 mg, Oral, Q12H  multivitamin with minerals, 1 tablet, Oral, Daily  mupirocin, , Each Nare, BID  pantoprazole, 40 mg, Oral, Daily  polyethylene glycol, 17 g, Oral, BID  senna-docusate sodium, 2 tablet, Oral, BID  thiamine, 100 mg, Oral, Daily      Continuous Infusions:     PRN  "Meds:.  acetaminophen **OR** acetaminophen **OR** acetaminophen    Calcium Replacement - Follow Nurse / BPA Driven Protocol    cyclobenzaprine    dextrose    dextrose    glucagon (human recombinant)    HYDROcodone-acetaminophen    LORazepam **OR** LORazepam **OR** LORazepam **OR** LORazepam **OR** LORazepam **OR** LORazepam    Magnesium Cardiology Dose Replacement - Follow Nurse / BPA Driven Protocol    Morphine **AND** naloxone    nitroglycerin    ondansetron    Phosphorus Replacement - Follow Nurse / BPA Driven Protocol    Potassium Replacement - Follow Nurse / BPA Driven Protocol        VITAL SIGNS  Vitals:    12/05/23 1256 12/05/23 1300 12/05/23 1400 12/05/23 1500   BP:  117/63 106/68 118/82   Pulse:  83 88 88   Resp:       Temp: 97.4 °F (36.3 °C)      TempSrc: Oral      SpO2:  93% 93% 92%   Weight:       Height:           Flowsheet Rows      Flowsheet Row First Filed Value   Admission Height 170.2 cm (67\") Documented at 12/01/2023 0715   Admission Weight 90.9 kg (200 lb 6.4 oz) Documented at 12/01/2023 0715             TELEMETRY: Ventricular pacemaker rhythm    Physical Exam:  Constitutional:       Appearance: Well-developed.   Eyes:      General: No scleral icterus.     Conjunctiva/sclera: Conjunctivae normal.      Pupils: Pupils are equal, round, and reactive to light.   HENT:      Head: Normocephalic and atraumatic.   Neck:      Vascular: No carotid bruit or JVD.   Pulmonary:      Effort: Pulmonary effort is normal.      Breath sounds: Normal breath sounds. No wheezing. No rales.   Cardiovascular:      Normal rate. Regular rhythm.   Pulses:     Intact distal pulses.   Abdominal:      General: Bowel sounds are normal.      Palpations: Abdomen is soft.   Musculoskeletal: Normal range of motion.      Cervical back: Normal range of motion and neck supple. Skin:     General: Skin is warm and dry.      Findings: No rash.   Neurological:      Mental Status: Alert.      Comments: No focal deficits          Results " Review:   I reviewed the patient's new clinical results.  Lab Results (last 24 hours)       Procedure Component Value Units Date/Time    Potassium [[id number]]  (Normal) Collected: 12/05/23 1335    Specimen: Blood Updated: 12/05/23 1358     Potassium 4.1 mmol/L     POC Glucose Once [[id number]]  (Normal) Collected: 12/05/23 1247    Specimen: Blood Updated: 12/05/23 1252     Glucose 102 mg/dL      Comment: Serial Number: [id number]:  [id number]       POC Glucose Once [[id number]]  (Normal) Collected: 12/05/23 0833    Specimen: Blood Updated: 12/05/23 0835     Glucose 95 mg/dL      Comment: Serial Number: [id number]:  [id number]       Basic Metabolic Panel [[id number]]  (Abnormal) Collected: 12/05/23 0520    Specimen: Blood Updated: 12/05/23 0604     Glucose 92 mg/dL      BUN 10 mg/dL      Creatinine 0.64 mg/dL      Sodium 135 mmol/L      Potassium 3.8 mmol/L      Chloride 98 mmol/L      CO2 25.0 mmol/L      Calcium 9.2 mg/dL      BUN/Creatinine Ratio 15.6     Anion Gap 12.0 mmol/L      eGFR 110.4 mL/min/1.73     Narrative:      GFR Normal >60  Chronic Kidney Disease <60  Kidney Failure <15      CBC (No Diff) [[id number]]  (Abnormal) Collected: 12/05/23 0520    Specimen: Blood Updated: 12/05/23 0531     WBC 15.50 10*3/mm3      RBC 3.60 10*6/mm3      Hemoglobin 12.3 g/dL      Hematocrit 35.6 %      MCV 98.8 fL      MCH 34.0 pg      MCHC 34.4 g/dL      RDW 12.6 %      RDW-SD 45.9 fl      MPV 9.4 fL      Platelets 203 10*3/mm3     POC Glucose Once [[id number]]  (Abnormal) Collected: 12/04/23 1957    Specimen: Blood Updated: 12/04/23 1959     Glucose 120 mg/dL      Comment: Serial Number: [id number]:  [id number]       POC Glucose Once [[id number]]  (Normal) Collected: 12/04/23 1740    Specimen: Blood Updated: 12/04/23 1742     Glucose 96 mg/dL      Comment: Serial Number: [id number]:  [id number]               Imaging Results (Last 24 Hours)       Procedure Component Value Units Date/Time    XR Chest 1  View [[id number]] Collected: 12/05/23 1335     Updated: 12/05/23 1339    Narrative:      XR CHEST 1 VW    Date of Exam: 12/5/2023 1:17 PM EST    Indication: Chest tube removal    Comparison: 12/5/2023 at 2:28 a.m.    Findings:  In the interval the right internal jugular introducer sheath has been removed. The left basilar thoracostomy tube has also been removed. Patient is again noted be status post median sternotomy. There is mild cardiomegaly. Pulmonary vessels remain   indistinct consistent with pulmonary vascular congestion. Lung volumes are low with persistent left basilar airspace disease likely due to atelectasis. There is no evidence of pneumothorax. Small left pleural effusion is unchanged. No right pleural   effusion.      Impression:      Impression:    1. Interval removal of left thoracostomy tube and right internal jugular introducer sheath.  2. No evidence pneumothorax.  3. No change in left basilar airspace disease and small left pleural effusion.      Electronically Signed: Mg Tran MD    12/5/2023 1:37 PM EST    Workstation ID: [id number]    XR Chest 1 View [[id number]] Collected: 12/05/23 0706     Updated: 12/05/23 0721    Narrative:      XR CHEST 1 VW    Date of Exam: 12/5/2023 2:35 AM EST    Indication: Post-Op Heart Surgery    Comparison: 12/4/2023    Findings:  There is cardiomegaly and surgical changes in the cardiomediastinum. There are left basilar atelectasis or consolidations and trace effusion. The right lung is clear. Interval removal of a right-sided Franklinton-Moy catheter with the remaining introducer in   place      Impression:      Impression:    Cardiomegaly with postoperative changes    Stable left basilar atelectasis with small effusion    Interval removal of the right Franklinton-Moy catheter with introducer in place      Electronically Signed: Rosales Pak MD    12/5/2023 7:18 AM EST    Workstation ID: [id number]            EKG      I personally viewed and interpreted the patient's  EKG/Telemetry data:    ECHOCARDIOGRAM:  Results for orders placed during the hospital encounter of 12/01/23    Adult Transthoracic Echo Complete w/ Color, Spectral and Contrast if Necessary Per Protocol    Interpretation Summary    Left ventricular ejection fraction appears to be 61 - 65%.    Saline test results are negative.       STRESS MYOVIEW:  Results for orders placed during the hospital encounter of 11/28/23    Stress Test With Myocardial Perfusion One Day    Interpretation Summary    Left ventricular ejection fraction is normal (Calculated EF = 60%).    High risk for ischemic heart disease.    Myocardial perfusion imaging indicates a large-sized, severe area of ischemia located in the anterior wall, apex and septal wall.    Impressions are consistent with a high risk study.       CARDIAC CATHETERIZATION:  Results for orders placed during the hospital encounter of 12/01/23    Cardiac Catheterization/Vascular Study       OTHER:         Assessment & Plan     Angina with abnormal Myoview  Coronary artery disease  Patient had chest pain with risk factors for coronary disease and had an abnormal Myoview and hence he underwent cardiac catheterization  Patient had significant left main and three-vessel coronary disease and is scheduled for coronary bypass surgery  Cardiac surgeons have seen the patient is having workup done for pre-CABG.  Patient had an echocardiogram which showed normal LV systolic function  Patient is currently stable on medical therapy with aspirin statins and beta-blockers  Patient had a positive MRSA screen.  Patient had a vein mapping which is adequate on carotid duplex which is normal and his COVID screen is negative.  He also has PFTs which were within normal limits.  Patient underwent coronary artery bypass surgery x 3 vessels with a LIMA to LAD and SVG to the marginal branch and SVG to the RCA without any complications  Patient is extubated and sitting in chair  Patient's chest tubes are  "removed.  Patient's Dodson catheter has been removed  Will ambulate him and will adjust medicines accordingly.    Hypertension  Patient is currently on beta-blockers and tolerating very well    Hyperlipidemia  Patient is on Crestor and his lipid levels are followed by the primary care doctor    Home soon.    I discussed the patients findings and my recommendations with patient and nurse    Fred Lopez MD  12/05/23  16:00 EST                Electronically signed by Fred Lopez MD at 12/05/23 1600       Ary Uribe PA at 12/05/23 1000           LOS: 4 days   Patient Care Team:  Morris Mercado PA-C as PCP - General (Physician Assistant)  Fred Lopez MD as Consulting Physician (Cardiology)    Chief Complaint: s/p CABG      Subjective  Reports pain from chest tubes.      Objective     Vital Signs  Temp:  [97.2 °F (36.2 °C)-98 °F (36.7 °C)] 97.2 °F (36.2 °C)  Heart Rate:  [73-89] 82  Resp:  [21] 21  BP: (105-138)/(58-81) 114/67  Body mass index is 32.14 kg/m².    Intake/Output Summary (Last 24 hours) at 12/5/2023 1237  Last data filed at 12/5/2023 1100  Gross per 24 hour   Intake 1742 ml   Output 1925 ml   Net -183 ml     I/O this shift:  In: -   Out: 620 [Urine:550; Chest Tube:70]    Chest tube drainage last 8 hours: 30 cc    Wt Readings from Last 3 Encounters:   12/05/23 93.1 kg (205 lb 3.2 oz)   11/15/23 91.6 kg (202 lb)   08/28/20 92.9 kg (204 lb 12.9 oz)       Flowsheet Rows      Flowsheet Row First Filed Value   Admission Height 170.2 cm (67\") Documented at 12/01/2023 0715   Admission Weight 90.9 kg (200 lb 6.4 oz) Documented at 12/01/2023 0715            Objective:  General Appearance:  In no acute distress, not in pain, comfortable and uncomfortable (Up in chair).    Vital signs: (most recent): Blood pressure 114/67, pulse 82, temperature 97.2 °F (36.2 °C), temperature source Oral, resp. rate 21, height 170.2 cm (67\"), weight 93.1 kg (205 lb 3.2 oz), SpO2 94%.  Vital signs are normal.  No " fever.    Output: Producing urine.    Lungs:  Normal effort and normal respiratory rate.  There are decreased breath sounds (bases bilaterally).    Heart: Normal rate.  (HR 80s)  Abdomen: Abdomen is soft.    Extremities: Normal range of motion.  There is no dependent edema.    Neurological: Patient is alert and oriented to person, place and time.    Skin:  Warm and dry.  (Dressing c/d/i)              Results Review:        Results from last 7 days   Lab Units 12/05/23  0520 12/04/23  0310 12/03/23  1705 12/03/23  1053 12/03/23  1023   WBC 10*3/mm3 15.50* 15.30*  --   --  17.50*   HEMOGLOBIN g/dL 12.3* 12.3*  --   --  13.5   HEMOGLOBIN, POC g/dL  --   --  13.5   < >  --    HEMATOCRIT % 35.6* 36.8*  --   --  40.6   HEMATOCRIT POC %  --   --  40   < >  --    PLATELETS 10*3/mm3 203 198  --   --  196    < > = values in this interval not displayed.         PT/INR:    Protime   Date Value Ref Range Status   12/04/2023 11.5 9.6 - 11.7 Seconds Final   12/03/2023 11.8 (H) 9.6 - 11.7 Seconds Final   /  INR   Date Value Ref Range Status   12/04/2023 1.06 0.93 - 1.10 Final   12/03/2023 1.09 0.93 - 1.10 Final       Results from last 7 days   Lab Units 12/05/23  0520 12/04/23  0310 12/03/23  1023   SODIUM mmol/L 135* 138 138   POTASSIUM mmol/L 3.8 4.4 4.9   CHLORIDE mmol/L 98 105 103   CO2 mmol/L 25.0 24.0 26.0   BUN mg/dL 10 10 10   CREATININE mg/dL 0.64* 0.61* 0.73*   GLUCOSE mg/dL 92 157* 123*   CALCIUM mg/dL 9.2 8.7 9.2         Scheduled Meds:  aspirin, 81 mg, Oral, Daily  atorvastatin, 40 mg, Oral, Nightly  chlorhexidine, 15 mL, Mouth/Throat, Q12H  enoxaparin, 40 mg, Subcutaneous, Daily  folic acid, 1 mg, Oral, Daily  HYDROcodone-acetaminophen, 1 tablet, Oral, Once  insulin lispro, 2-9 Units, Subcutaneous, 4x Daily AC & at Bedtime  metoprolol tartrate, 25 mg, Oral, Q12H  multivitamin with minerals, 1 tablet, Oral, Daily  mupirocin, , Each Nare, BID  pantoprazole, 40 mg, Oral, Daily  polyethylene glycol, 17 g, Oral,  BID  senna-docusate sodium, 2 tablet, Oral, BID  thiamine, 100 mg, Oral, Daily        Infusions:         Assessment & Plan    - MV CAD including left main disease, EF 60% (stress test)--s/p CABG x 3 ([place])  - HTN--stable  - HLD--statin  - Preop sinus bradycardia  - Tobacco abuse--35 pack year hx, PFTs noted, cessation d/w pt  - MRSA nasal colonization--mupirocin/ vanc       POD #2. Doing well. Discontinue central line, chest tubes, and dumont catheter. Lasix 20 mg IV once with KCl 20 mEq. Continue supportive care. Encourage IS and ambulation.        DAVID Martinez  12/05/23  12:37 EST    Electronically signed by Ary Uribe PA at 12/05/23 1240       Fred Lopez MD at 12/04/23 1202          CARDIOLOGY PROGRESS NOTE:    John Paul Tineo  58 y.o.  male  1965  [id number]      Referring Provider: Cardiac surgeon    Reason for follow-up: Coronary artery disease     Patient Care Team:  Morris Mercado PA-C as PCP - General (Physician Assistant)  Fred Lopez MD as Consulting Physician (Cardiology)    Subjective .  Patient is extubated sitting in chair and chest tubes are draining very well    Objective i sitting in chair comfortably     Review of Systems   Constitutional: Negative for fever and malaise/fatigue.   HENT:  Negative for ear pain and nosebleeds.    Eyes:  Negative for blurred vision and double vision.   Cardiovascular:  Negative for chest pain, dyspnea on exertion and palpitations.   Respiratory:  Negative for cough and shortness of breath.    Skin:  Negative for rash.   Musculoskeletal:  Negative for joint pain.   Gastrointestinal:  Negative for abdominal pain, nausea and vomiting.   Neurological:  Negative for focal weakness and headaches.   Psychiatric/Behavioral:  Negative for depression. The patient is not nervous/anxious.    All other systems reviewed and are negative.      Allergies: Patient has no known allergies.    Scheduled Meds:aspirin, 81 mg, Oral,  "Daily  atorvastatin, 40 mg, Oral, Nightly  ceFAZolin, 2 g, Intravenous, Q8H  chlorhexidine, 15 mL, Mouth/Throat, Q12H  enoxaparin, 40 mg, Subcutaneous, Daily  insulin lispro, 2-9 Units, Subcutaneous, 4x Daily AC & at Bedtime  metoprolol tartrate, 12.5 mg, Oral, Q12H  mupirocin, , Each Nare, BID  pantoprazole, 40 mg, Oral, Daily  polyethylene glycol, 17 g, Oral, BID  senna-docusate sodium, 2 tablet, Oral, BID  vancomycin, 15 mg/kg, Intravenous, Q12H      Continuous Infusions:Pharmacy to dose vancomycin,       PRN Meds:.  acetaminophen **OR** acetaminophen **OR** acetaminophen    albumin human    Calcium Replacement - Follow Nurse / BPA Driven Protocol    dextrose    dextrose    glucagon (human recombinant)    HYDROcodone-acetaminophen    Magnesium Cardiology Dose Replacement - Follow Nurse / BPA Driven Protocol    Morphine **AND** naloxone    nitroglycerin    ondansetron    Pharmacy to dose vancomycin    Phosphorus Replacement - Follow Nurse / BPA Driven Protocol    Potassium Replacement - Follow Nurse / BPA Driven Protocol        VITAL SIGNS  Vitals:    12/04/23 0930 12/04/23 1000 12/04/23 1030 12/04/23 1100   BP: 120/71 126/76 119/70 125/73   Pulse: 73 78 74 75   Resp:       Temp:       TempSrc:       SpO2: 97% 96% 94% 94%   Weight:       Height:           Flowsheet Rows      Flowsheet Row First Filed Value   Admission Height 170.2 cm (67\") Documented at 12/01/2023 0715   Admission Weight 90.9 kg (200 lb 6.4 oz) Documented at 12/01/2023 0715             TELEMETRY: Ventricular pacemaker rhythm    Physical Exam:  Constitutional:       Appearance: Well-developed.   Eyes:      General: No scleral icterus.     Conjunctiva/sclera: Conjunctivae normal.      Pupils: Pupils are equal, round, and reactive to light.   HENT:      Head: Normocephalic and atraumatic.   Neck:      Vascular: No carotid bruit or JVD.   Pulmonary:      Effort: Pulmonary effort is normal.      Breath sounds: Normal breath sounds. No wheezing. No " rales.   Cardiovascular:      Normal rate. Regular rhythm.   Pulses:     Intact distal pulses.   Abdominal:      General: Bowel sounds are normal.      Palpations: Abdomen is soft.   Musculoskeletal: Normal range of motion.      Cervical back: Normal range of motion and neck supple. Skin:     General: Skin is warm and dry.      Findings: No rash.   Neurological:      Mental Status: Alert.      Comments: No focal deficits          Results Review:   I reviewed the patient's new clinical results.  Lab Results (last 24 hours)       Procedure Component Value Units Date/Time    POC Glucose Once [[id number]]  (Abnormal) Collected: 12/04/23 1120    Specimen: Blood Updated: 12/04/23 1122     Glucose 122 mg/dL      Comment: Serial Number: [id number]:  [id number]       POC Glucose Once [[id number]]  (Abnormal) Collected: 12/04/23 0839    Specimen: Blood Updated: 12/04/23 0841     Glucose 118 mg/dL      Comment: Serial Number: [id number]:  [id number]       POC Glucose Once [[id number]]  (Normal) Collected: 12/04/23 0733    Specimen: Blood Updated: 12/04/23 0736     Glucose 99 mg/dL      Comment: Serial Number: [id number]:  [id number]       POC Glucose Once [[id number]]  (Abnormal) Collected: 12/04/23 0607    Specimen: Blood Updated: 12/04/23 0608     Glucose 113 mg/dL      Comment: Serial Number: [id number]:  [id number]       Blood Gas, Mixed [[id number]]  (Abnormal) Collected: 12/04/23 0524    Specimen: Blood Updated: 12/04/23 0527     pH, mixed 7.34 pH units      PCO2 MIXED 44.8 mmHg      PO2 MIXED 33.1 mmHg      HCO3 MIXED 24.1 mmol/L      CO2 Content 25.5 mmol/L      Base Excess, Arterial -1.9 mmol/L      Comment: Serial Number: [id number]:  [id number]        O2 SATURATION MIXED 59.5 %      Hemodilution No     Site PA     Valentino's Test N/A     Modality Cannula     FIO2 36 %      Base Deficit -1.7 mEq/liter     POC Glucose Once [[id number]]  (Abnormal) Collected: 12/04/23 0502    Specimen: Blood Updated:  12/04/23 0503     Glucose 120 mg/dL      Comment: Serial Number: [id number]:  [id number]       POC Glucose Once [[id number]]  (Abnormal) Collected: 12/04/23 0416    Specimen: Blood Updated: 12/04/23 0418     Glucose 129 mg/dL      Comment: Serial Number: [id number]:  [id number]       Renal Function Panel [[id number]]  (Abnormal) Collected: 12/04/23 0310    Specimen: Blood Updated: 12/04/23 0345     Glucose 157 mg/dL      BUN 10 mg/dL      Creatinine 0.61 mg/dL      Sodium 138 mmol/L      Potassium 4.4 mmol/L      Chloride 105 mmol/L      CO2 24.0 mmol/L      Calcium 8.7 mg/dL      Albumin 4.0 g/dL      Phosphorus 3.4 mg/dL      Anion Gap 9.0 mmol/L      BUN/Creatinine Ratio 16.4     eGFR 112.0 mL/min/1.73     Narrative:      GFR Normal >60  Chronic Kidney Disease <60  Kidney Failure <15      Magnesium [[id number]]  (Abnormal) Collected: 12/04/23 0310    Specimen: Blood Updated: 12/04/23 0345     Magnesium 2.8 mg/dL     Protime-INR [[id number]]  (Normal) Collected: 12/04/23 0310    Specimen: Blood Updated: 12/04/23 0337     Protime 11.5 Seconds      INR 1.06    Calcium, Ionized [[id number]]  (Normal) Collected: 12/04/23 0310    Specimen: Blood Updated: 12/04/23 0334     Ionized Calcium 1.22 mmol/L     CBC & Differential [[id number]]  (Abnormal) Collected: 12/04/23 0310    Specimen: Blood Updated: 12/04/23 0320    Narrative:      The following orders were created for panel order CBC & Differential.  Procedure                               Abnormality         Status                     ---------                               -----------         ------                     CBC Auto Differential[[id number]]        Abnormal            Final result                 Please view results for these tests on the individual orders.    CBC Auto Differential [[id number]]  (Abnormal) Collected: 12/04/23 0310    Specimen: Blood Updated: 12/04/23 0320     WBC 15.30 10*3/mm3      RBC 3.70 10*6/mm3      Hemoglobin 12.3 g/dL       Hematocrit 36.8 %      MCV 99.6 fL      MCH 33.3 pg      MCHC 33.4 g/dL      RDW 12.5 %      RDW-SD 45.9 fl      MPV 9.1 fL      Platelets 198 10*3/mm3      Neutrophil % 86.5 %      Lymphocyte % 4.7 %      Monocyte % 8.5 %      Eosinophil % 0.0 %      Basophil % 0.3 %      Neutrophils, Absolute 13.20 10*3/mm3      Lymphocytes, Absolute 0.70 10*3/mm3      Monocytes, Absolute 1.30 10*3/mm3      Eosinophils, Absolute 0.00 10*3/mm3      Basophils, Absolute 0.00 10*3/mm3      nRBC 0.0 /100 WBC     POC Glucose Once [[id number]]  (Abnormal) Collected: 12/04/23 0311    Specimen: Blood Updated: 12/04/23 0316     Glucose 144 mg/dL      Comment: Serial Number: [id number]:  [id number]       POC Glucose Once [[id number]]  (Abnormal) Collected: 12/04/23 0213    Specimen: Blood Updated: 12/04/23 0215     Glucose 121 mg/dL      Comment: Serial Number: [id number]:  [id number]       POC Glucose Once [[id number]]  (Abnormal) Collected: 12/04/23 0124    Specimen: Blood Updated: 12/04/23 0126     Glucose 126 mg/dL      Comment: Serial Number: [id number]:  [id number]       POC Glucose Once [[id number]]  (Abnormal) Collected: 12/04/23 0014    Specimen: Blood Updated: 12/04/23 0015     Glucose 128 mg/dL      Comment: Serial Number: [id number]:  [id number]       POC Glucose Once [[id number]]  (Abnormal) Collected: 12/03/23 2329    Specimen: Blood Updated: 12/03/23 2331     Glucose 137 mg/dL      Comment: Serial Number: [id number]:  [id number]       POC Glucose Once [[id number]]  (Abnormal) Collected: 12/03/23 2219    Specimen: Blood Updated: 12/03/23 2220     Glucose 136 mg/dL      Comment: Serial Number: [id number]:  [id number]       POC Glucose Once [[id number]]  (Abnormal) Collected: 12/03/23 2111    Specimen: Blood Updated: 12/03/23 2112     Glucose 125 mg/dL      Comment: Serial Number: [id number]:  [id number]       POC Glucose Once [[id number]]  (Abnormal) Collected: 12/03/23 2009    Specimen: Blood  Updated: 12/03/23 2011     Glucose 123 mg/dL      Comment: Serial Number: [id number]:  [id number]       POC Glucose Once [[id number]]  (Abnormal) Collected: 12/03/23 1908    Specimen: Blood Updated: 12/03/23 1909     Glucose 110 mg/dL      Comment: Serial Number: [id number]:  [id number]       POC Glucose Once [[id number]]  (Abnormal) Collected: 12/03/23 1807    Specimen: Blood Updated: 12/03/23 1808     Glucose 141 mg/dL      Comment: Serial Number: [id number]:  [id number]       Blood Gas, Arterial - [[id number]]  (Abnormal) Collected: 12/03/23 1705    Specimen: Arterial Blood Updated: 12/03/23 1709     Site Arterial Line     Valentino's Test N/A     pH, Arterial 7.307 pH units      pCO2, Arterial 47.3 mm Hg      pO2, Arterial 68.2 mm Hg      HCO3, Arterial 23.6 mmol/L      Base Excess, Arterial -3.0 mmol/L      Comment: Serial Number: [id number]:  [id number]        O2 Saturation, Arterial 91.3 %      CO2 Content 25.1 mmol/L      Barometric Pressure for Blood Gas --     Comment: N/A        Modality Cannula     FIO2 36 %      Hemodilution No    POCT Electrolytes +HGB +HCT [[id number]]  (Abnormal) Collected: 12/03/23 1705    Specimen: Arterial Blood Updated: 12/03/23 1709     Sodium 140 mmol/L      POC Potassium 4.2 mmol/L      Ionized Calcium 1.22 mmol/L      Comment: Serial Number: [id number]:  [id number]        Glucose 166 mg/dL      Hematocrit 40 %      Hemoglobin 13.5 g/dL     POC Glucose Once [[id number]]  (Abnormal) Collected: 12/03/23 1705    Specimen: Arterial Blood Updated: 12/03/23 1709     Glucose 166 mg/dL      Comment: Serial Number: [id number]:  [id number]       Blood Gas, Arterial - [[id number]]  (Abnormal) Collected: 12/03/23 1521    Specimen: Arterial Blood Updated: 12/03/23 1533     Site Arterial Line     Valentino's Test N/A     pH, Arterial 7.279 pH units      pCO2, Arterial 49.0 mm Hg      pO2, Arterial 76.4 mm Hg      HCO3, Arterial 23.0 mmol/L      Base Excess, Arterial -4.1 mmol/L       Comment: Serial Number: [id number]:  [id number]        O2 Saturation, Arterial 93.1 %      CO2 Content 24.5 mmol/L      Barometric Pressure for Blood Gas --     Comment: N/A        Modality Adult Vent     FIO2 40 %      Ventilator Mode CPAP/PS     PEEP 5     Hemodilution No    POCT Electrolytes +HGB +HCT [[id number]]  (Abnormal) Collected: 12/03/23 1521    Specimen: Arterial Blood Updated: 12/03/23 1533     Sodium 141 mmol/L      POC Potassium 4.3 mmol/L      Ionized Calcium 1.19 mmol/L      Comment: Serial Number: [id number]:  [id number]        Glucose 134 mg/dL      Hematocrit 38 %      Hemoglobin 13.1 g/dL     POC Glucose Once [[id number]]  (Abnormal) Collected: 12/03/23 1521    Specimen: Arterial Blood Updated: 12/03/23 1533     Glucose 134 mg/dL      Comment: Serial Number: [id number]:  [id number]       Blood Gas, Arterial - [[id number]]  (Abnormal) Collected: 12/03/23 1356    Specimen: Arterial Blood Updated: 12/03/23 1402     Site Arterial Line     Valentino's Test N/A     pH, Arterial 7.262 pH units      pCO2, Arterial 52.6 mm Hg      pO2, Arterial 80.2 mm Hg      HCO3, Arterial 23.7 mmol/L      Base Excess, Arterial -3.9 mmol/L      Comment: Serial Number: [id number]:  [id number]        O2 Saturation, Arterial 93.6 %      CO2 Content 25.4 mmol/L      Barometric Pressure for Blood Gas --     Comment: N/A        Modality Adult Vent     FIO2 40 %      Ventilator Mode CPAP/PS     PEEP 5     Hemodilution No    POCT Electrolytes +HGB +HCT [[id number]]  (Abnormal) Collected: 12/03/23 1356    Specimen: Arterial Blood Updated: 12/03/23 1402     Sodium 141 mmol/L      POC Potassium 4.3 mmol/L      Ionized Calcium 1.21 mmol/L      Comment: Serial Number: [id number]:  [id number]        Glucose 132 mg/dL      Hematocrit 40 %      Hemoglobin 13.5 g/dL     POC Glucose Once [[id number]]  (Abnormal) Collected: 12/03/23 1356    Specimen: Arterial Blood Updated: 12/03/23 1402     Glucose 132 mg/dL      Comment: Serial Number:  [id number]:  [id number]       Blood Gas, Arterial - [[id number]]  (Abnormal) Collected: 12/03/23 1314    Specimen: Arterial Blood Updated: 12/03/23 1318     Site Arterial Line     Valentino's Test N/A     pH, Arterial 7.329 pH units      pCO2, Arterial 41.9 mm Hg      pO2, Arterial 82.5 mm Hg      HCO3, Arterial 22.0 mmol/L      Base Excess, Arterial -3.8 mmol/L      Comment: Serial Number: [id number]:  [id number]        O2 Saturation, Arterial 95.2 %      CO2 Content 23.3 mmol/L      Barometric Pressure for Blood Gas --     Comment: N/A        Modality Adult Vent     FIO2 50 %      Ventilator Mode AC     Set Tidal Volume 550     PEEP 5     Hemodilution No     Respiratory Rate 20    POCT Electrolytes +HGB +HCT [[id number]]  (Abnormal) Collected: 12/03/23 1314    Specimen: Arterial Blood Updated: 12/03/23 1318     Sodium 140 mmol/L      POC Potassium 4.3 mmol/L      Ionized Calcium 1.18 mmol/L      Comment: Serial Number: [id number]:  [id number]        Glucose 118 mg/dL      Hematocrit 39 %      Hemoglobin 13.2 g/dL     POC Glucose Once [[id number]]  (Abnormal) Collected: 12/03/23 1314    Specimen: Arterial Blood Updated: 12/03/23 1318     Glucose 118 mg/dL      Comment: Serial Number: [id number]:  [id number]       POCT Electrolytes +HGB +HCT [[id number]]  (Abnormal) Collected: 12/03/23 1307    Specimen: Arterial Blood Updated: 12/03/23 1312     Sodium 141 mmol/L      POC Potassium 4.3 mmol/L      Ionized Calcium 1.20 mmol/L      Comment: Serial Number: [id number]:  [id number]        Glucose 119 mg/dL      Hematocrit 39 %      Hemoglobin 13.3 g/dL     POC Glucose Once [[id number]]  (Abnormal) Collected: 12/03/23 1307    Specimen: Arterial Blood Updated: 12/03/23 1312     Glucose 119 mg/dL      Comment: Serial Number: [id number]:  [id number]       Blood Gas, Arterial - [[id number]]  (Abnormal) Collected: 12/03/23 1224    Specimen: Arterial Blood Updated: 12/03/23 1231     Site Arterial Line     Valentino's Test N/A     pH, Arterial 7.322  pH units      pCO2, Arterial 45.3 mm Hg      pO2, Arterial 78.4 mm Hg      HCO3, Arterial 23.4 mmol/L      Base Excess, Arterial -2.8 mmol/L      Comment: Serial Number: [id number]:  [id number]        O2 Saturation, Arterial 94.3 %      CO2 Content 24.8 mmol/L      Barometric Pressure for Blood Gas --     Comment: N/A        Modality Adult Vent     FIO2 50 %      Ventilator Mode AC     Set Tidal Volume 550     PEEP 8     Hemodilution No     Respiratory Rate 18    POCT Electrolytes +HGB +HCT [[id number]]  (Abnormal) Collected: 12/03/23 1224    Specimen: Arterial Blood Updated: 12/03/23 1231     Sodium 141 mmol/L      POC Potassium 4.3 mmol/L      Ionized Calcium 1.22 mmol/L      Comment: Serial Number: [id number]:  [id number]        Glucose 118 mg/dL      Hematocrit 40 %      Hemoglobin 13.6 g/dL     POC Glucose Once [[id number]]  (Abnormal) Collected: 12/03/23 1224    Specimen: Arterial Blood Updated: 12/03/23 1231     Glucose 118 mg/dL      Comment: Serial Number: [id number]:  [id number]               Imaging Results (Last 24 Hours)       Procedure Component Value Units Date/Time    XR Chest 1 View [[id number]] Collected: 12/04/23 0728     Updated: 12/04/23 0733    Narrative:      XR CHEST 1 VW    Date of Exam: 12/4/2023 4:14 AM EST    Indication: Post-Op Heart Surgery    Comparison: 12/3/2023    Findings:  The trachea is midline. Interval removal of endotracheal tube. Left basilar chest tube in place. No change in position of a right internal jugular [place]-Moy catheter. Cardiomegaly with stable changes post CABG. Left lower atelectasis and a left pleural   effusion. Minimal right basilar atelectasis. The right lung is otherwise clear      Impression:      Impression:  Interval removal of endotracheal tube    Mild increase in left lower atelectasis with a small effusion.    Minimal right basilar atelectasis      Electronically Signed: Rosales Pak MD    12/4/2023 7:31 AM EST    Workstation ID: [id number]    XR Chest 1  View [[id number]] Collected: 12/03/23 1210     Updated: 12/03/23 1213    Narrative:      XR CHEST 1 VW    Date of Exam: 12/3/2023 10:47 AM EST    Indication: Post-Op Check Line & Tube Placement    Comparison: 12/1/2023    Findings:  ET tube is above the tiffany. Status post sternotomy. Esophagogastric tube tip overlies the body of the stomach. Right IJ [place]-Moy catheter tip overlies the main pulmonary outflow. Mediastinal drainage tube and left-sided chest tube noted. Negative for   pneumothorax. Mild bibasilar atelectasis left greater than right. No significant effusion.      Impression:      Impression:  1. Postoperative changes of sternotomy with lines and support tubes in expected location.  2. No pneumothorax.  3. Mild bibasilar atelectasis left greater than right.        Electronically Signed: Giovanni Roman MD    12/3/2023 12:11 PM EST    Workstation ID: [id number]            EKG      I personally viewed and interpreted the patient's EKG/Telemetry data:    ECHOCARDIOGRAM:  Results for orders placed during the hospital encounter of 12/01/23    Adult Transthoracic Echo Complete w/ Color, Spectral and Contrast if Necessary Per Protocol    Interpretation Summary    Left ventricular ejection fraction appears to be 61 - 65%.    Saline test results are negative.       STRESS MYOVIEW:  Results for orders placed during the hospital encounter of 11/28/23    Stress Test With Myocardial Perfusion One Day    Interpretation Summary    Left ventricular ejection fraction is normal (Calculated EF = 60%).    High risk for ischemic heart disease.    Myocardial perfusion imaging indicates a large-sized, severe area of ischemia located in the anterior wall, apex and septal wall.    Impressions are consistent with a high risk study.       CARDIAC CATHETERIZATION:  Results for orders placed during the hospital encounter of 12/01/23    Cardiac Catheterization/Vascular Study       OTHER:         Assessment & Plan     Angina with abnormal  Myoview  Coronary artery disease  Patient had chest pain with risk factors for coronary disease and had an abnormal Myoview and hence he underwent cardiac catheterization  Patient had significant left main and three-vessel coronary disease and is scheduled for coronary bypass surgery  Cardiac surgeons have seen the patient is having workup done for pre-CABG.  Patient had an echocardiogram which showed normal LV systolic function  Patient is currently stable on medical therapy with aspirin statins and beta-blockers  Patient had a positive MRSA screen.  Patient had a vein mapping which is adequate on carotid duplex which is normal and his COVID screen is negative.  He also has PFTs which were within normal limits.  Patient underwent coronary artery bypass surgery x 3 vessels with a LIMA to LAD and SVG to the marginal branch and SVG to the RCA without any complications  Patient is extubated and sitting in chair  Patient's chest tubes are draining very well and can be removed soon  Patient is off vasopressors and will start him on oral medicines including aspirin beta-blockers and statins    Hypertension  Patient is still being paced and hence we will start the beta-blockers once he is stable.    Hyperlipidemia  Patient is on Crestor and his lipid levels are followed by the primary care doctor    I discussed the patients findings and my recommendations with patient and nurse    Fred Lopez MD  12/04/23  12:02 EST                Electronically signed by Fred Lopez MD at 12/04/23 1204       Consult Notes (last 48 hours)  Notes from 12/04/23 0928 through 12/06/23 0928   No notes of this type exist for this encounter.

## 2023-12-06 NOTE — CASE MANAGEMENT/SOCIAL WORK
Continued Stay Note  BHANU Rhodes     Patient Name: John Paul Tineo  MRN: [id number]  Today's Date: 12/6/2023    Admit Date: 12/1/2023    Plan: Plan to dc home with mother to assist.   Discharge Plan       Row Name 12/06/23 8633       Plan    Plan Plan to dc home with mother to assist.    Patient/Family in Agreement with Plan yes    Plan Comments DC home with mother to assist. DC Barriers: POD #3,  pacer wires, 1L O2, IV Abxs, IV Lasix.                 Expected Discharge Date and Time       Expected Discharge Date Expected Discharge Time    Dec 8, 2023               ADITI Roche RN  SIPS/ICU   O: [phone]  C: [phone]  [email or web address]

## 2023-12-07 ENCOUNTER — APPOINTMENT (OUTPATIENT)
Dept: GENERAL RADIOLOGY | Facility: HOSPITAL | Age: 58
DRG: 234 | End: 2023-12-07
Payer: COMMERCIAL

## 2023-12-07 PROBLEM — Z95.1 S/P CABG X 3: Status: ACTIVE | Noted: 2023-12-07

## 2023-12-07 LAB
ANION GAP SERPL CALCULATED.3IONS-SCNC: 11 MMOL/L (ref 5–15)
BUN SERPL-MCNC: 17 MG/DL (ref 6–20)
BUN/CREAT SERPL: 22.7 (ref 7–25)
CALCIUM SPEC-SCNC: 9.3 MG/DL (ref 8.6–10.5)
CHLORIDE SERPL-SCNC: 96 MMOL/L (ref 98–107)
CO2 SERPL-SCNC: 30 MMOL/L (ref 22–29)
CREAT SERPL-MCNC: 0.75 MG/DL (ref 0.76–1.27)
DEPRECATED RDW RBC AUTO: 43.3 FL (ref 37–54)
EGFRCR SERPLBLD CKD-EPI 2021: 105.3 ML/MIN/1.73
ERYTHROCYTE [DISTWIDTH] IN BLOOD BY AUTOMATED COUNT: 12.5 % (ref 12.3–15.4)
GLUCOSE BLDC GLUCOMTR-MCNC: 101 MG/DL (ref 70–105)
GLUCOSE BLDC GLUCOMTR-MCNC: 129 MG/DL (ref 70–105)
GLUCOSE BLDC GLUCOMTR-MCNC: 82 MG/DL (ref 70–105)
GLUCOSE BLDC GLUCOMTR-MCNC: 84 MG/DL (ref 70–105)
GLUCOSE SERPL-MCNC: 98 MG/DL (ref 65–99)
HCT VFR BLD AUTO: 34.4 % (ref 37.5–51)
HGB BLD-MCNC: 11.7 G/DL (ref 13–17.7)
MCH RBC QN AUTO: 33.9 PG (ref 26.6–33)
MCHC RBC AUTO-ENTMCNC: 34 G/DL (ref 31.5–35.7)
MCV RBC AUTO: 99.7 FL (ref 79–97)
PLATELET # BLD AUTO: 298 10*3/MM3 (ref 140–450)
PMV BLD AUTO: 8.8 FL (ref 6–12)
POTASSIUM SERPL-SCNC: 4 MMOL/L (ref 3.5–5.2)
QT INTERVAL: 364 MS
QT INTERVAL: 404 MS
QTC INTERVAL: 381 MS
QTC INTERVAL: 424 MS
RBC # BLD AUTO: 3.45 10*6/MM3 (ref 4.14–5.8)
SODIUM SERPL-SCNC: 137 MMOL/L (ref 136–145)
WBC NRBC COR # BLD AUTO: 9.9 10*3/MM3 (ref 3.4–10.8)

## 2023-12-07 PROCEDURE — 85027 COMPLETE CBC AUTOMATED: CPT | Performed by: NURSE PRACTITIONER

## 2023-12-07 PROCEDURE — 25010000002 FUROSEMIDE PER 20 MG: Performed by: INTERNAL MEDICINE

## 2023-12-07 PROCEDURE — 94762 N-INVAS EAR/PLS OXIMTRY CONT: CPT

## 2023-12-07 PROCEDURE — 80048 BASIC METABOLIC PNL TOTAL CA: CPT | Performed by: NURSE PRACTITIONER

## 2023-12-07 PROCEDURE — 97110 THERAPEUTIC EXERCISES: CPT

## 2023-12-07 PROCEDURE — 99232 SBSQ HOSP IP/OBS MODERATE 35: CPT | Performed by: INTERNAL MEDICINE

## 2023-12-07 PROCEDURE — 94799 UNLISTED PULMONARY SVC/PX: CPT

## 2023-12-07 PROCEDURE — 97116 GAIT TRAINING THERAPY: CPT

## 2023-12-07 PROCEDURE — 71046 X-RAY EXAM CHEST 2 VIEWS: CPT

## 2023-12-07 PROCEDURE — 25010000002 ENOXAPARIN PER 10 MG: Performed by: NURSE PRACTITIONER

## 2023-12-07 PROCEDURE — 82948 REAGENT STRIP/BLOOD GLUCOSE: CPT

## 2023-12-07 RX ORDER — FUROSEMIDE 10 MG/ML
40 INJECTION INTRAMUSCULAR; INTRAVENOUS ONCE
Status: COMPLETED | OUTPATIENT
Start: 2023-12-07 | End: 2023-12-07

## 2023-12-07 RX ADMIN — CYCLOBENZAPRINE 10 MG: 10 TABLET, FILM COATED ORAL at 09:07

## 2023-12-07 RX ADMIN — 0.12% CHLORHEXIDINE GLUCONATE 15 ML: 1.2 RINSE ORAL at 20:18

## 2023-12-07 RX ADMIN — Medication 100 MG: at 08:59

## 2023-12-07 RX ADMIN — CYCLOBENZAPRINE 10 MG: 10 TABLET, FILM COATED ORAL at 00:55

## 2023-12-07 RX ADMIN — PANTOPRAZOLE SODIUM 40 MG: 40 TABLET, DELAYED RELEASE ORAL at 08:58

## 2023-12-07 RX ADMIN — CYCLOBENZAPRINE 10 MG: 10 TABLET, FILM COATED ORAL at 17:13

## 2023-12-07 RX ADMIN — POTASSIUM CHLORIDE 10 MEQ: 750 TABLET, EXTENDED RELEASE ORAL at 08:59

## 2023-12-07 RX ADMIN — HYDROCODONE BITARTRATE AND ACETAMINOPHEN 1 TABLET: 5; 325 TABLET ORAL at 16:09

## 2023-12-07 RX ADMIN — Medication 1 TABLET: at 08:58

## 2023-12-07 RX ADMIN — ENOXAPARIN SODIUM 40 MG: 100 INJECTION SUBCUTANEOUS at 16:09

## 2023-12-07 RX ADMIN — MUPIROCIN: 20 OINTMENT TOPICAL at 09:00

## 2023-12-07 RX ADMIN — HYDROCODONE BITARTRATE AND ACETAMINOPHEN 1 TABLET: 5; 325 TABLET ORAL at 00:56

## 2023-12-07 RX ADMIN — HYDROCODONE BITARTRATE AND ACETAMINOPHEN 1 TABLET: 5; 325 TABLET ORAL at 08:58

## 2023-12-07 RX ADMIN — 0.12% CHLORHEXIDINE GLUCONATE 15 ML: 1.2 RINSE ORAL at 08:58

## 2023-12-07 RX ADMIN — FOLIC ACID 1 MG: 1 TABLET ORAL at 08:59

## 2023-12-07 RX ADMIN — ASPIRIN 81 MG: 81 TABLET, COATED ORAL at 08:58

## 2023-12-07 RX ADMIN — ATORVASTATIN CALCIUM 40 MG: 40 TABLET, FILM COATED ORAL at 20:18

## 2023-12-07 RX ADMIN — HYDROCODONE BITARTRATE AND ACETAMINOPHEN 1 TABLET: 5; 325 TABLET ORAL at 00:54

## 2023-12-07 RX ADMIN — FUROSEMIDE 40 MG: 10 INJECTION, SOLUTION INTRAMUSCULAR; INTRAVENOUS at 11:20

## 2023-12-07 RX ADMIN — MUPIROCIN: 20 OINTMENT TOPICAL at 20:18

## 2023-12-07 RX ADMIN — METOPROLOL TARTRATE 37.5 MG: 25 TABLET, FILM COATED ORAL at 20:18

## 2023-12-07 RX ADMIN — FUROSEMIDE 20 MG: 20 TABLET ORAL at 08:59

## 2023-12-07 RX ADMIN — METOPROLOL TARTRATE 37.5 MG: 25 TABLET, FILM COATED ORAL at 08:59

## 2023-12-07 NOTE — PROGRESS NOTES
" LOS: 6 days   Patient Care Team:  Morris Mercado PA-C as PCP - General (Physician Assistant)  Fred Lopez MD as Consulting Physician (Cardiology)    Chief Complaint: s/p CABG x 3 (LIMA to LAD, SVG to OM, SVG to RCA), EVH of the right leg    Subjective:  Symptoms:  No shortness of breath or chest pain.    Diet:  Adequate intake.  No nausea or vomiting.    Activity level: Impaired due to weakness.    Pain:  He reports no pain.      Patient reports feeling much better today. Had BM yesterday. Breathing better but still on 2 L oxygen NC.    Objective     Vital Signs  Temp:  [97.6 °F (36.4 °C)-98.5 °F (36.9 °C)] 98 °F (36.7 °C)  Heart Rate:  [78-98] 96  Resp:  [20-35] 27  BP: ()/(51-79) 105/71  Body mass index is 31.79 kg/m².    Intake/Output Summary (Last 24 hours) at 12/7/2023 0958  Last data filed at 12/7/2023 0830  Gross per 24 hour   Intake 480 ml   Output --   Net 480 ml     I/O this shift:  In: 240 [P.O.:240]  Out: -       Wt Readings from Last 3 Encounters:   12/07/23 92.1 kg (203 lb)   11/15/23 91.6 kg (202 lb)   08/28/20 92.9 kg (204 lb 12.9 oz)       Flowsheet Rows      Flowsheet Row First Filed Value   Admission Height 170.2 cm (67\") Documented at 12/01/2023 0715   Admission Weight 90.9 kg (200 lb 6.4 oz) Documented at 12/01/2023 0715            Objective:  General Appearance:  In no acute distress and comfortable (Up in chair).    Vital signs: (most recent): Blood pressure 105/69, pulse 90, temperature 97.6 °F (36.4 °C), temperature source Oral, resp. rate 25, height 170.2 cm (67\"), weight 92.1 kg (203 lb), SpO2 94%.  Vital signs are normal.  No fever.    Output: Producing urine and producing stool.    Lungs:  Normal effort and normal respiratory rate.  No decreased breath sounds (bases bilaterally).  (On 2 L NC)  Heart: Normal rate.  Regular rhythm.  (SR on tele monitor, HR 80-90s)  Abdomen: Abdomen is soft and non-distended.  Bowel sounds are normal.     Extremities: Normal range of motion.  " "There is no dependent edema.    Neurological: Patient is alert and oriented to person, place and time.    Skin:  Warm and dry.  (Sternal incision, CDI )          Results Review:        Results from last 7 days   Lab Units 12/07/23  0537 12/06/23  0757 12/05/23  0520   WBC 10*3/mm3 9.90 13.20* 15.50*   HEMOGLOBIN g/dL 11.7* 12.8* 12.3*   HEMATOCRIT % 34.4* 37.8 35.6*   PLATELETS 10*3/mm3 298 258 203         PT/INR:    No results found for: \"PROTIME\"  /  No results found for: \"INR\"      Results from last 7 days   Lab Units 12/07/23  0537 12/06/23 0757 12/05/23  1335 12/05/23  0520   SODIUM mmol/L 137 133*  --  135*   POTASSIUM mmol/L 4.0 3.6 4.1 3.8   CHLORIDE mmol/L 96* 95*  --  98   CO2 mmol/L 30.0* 25.0  --  25.0   BUN mg/dL 17 15  --  10   CREATININE mg/dL 0.75* 0.61*  --  0.64*   GLUCOSE mg/dL 98 130*  --  92   CALCIUM mg/dL 9.3 9.5  --  9.2         Scheduled Meds:  aspirin, 81 mg, Oral, Daily  atorvastatin, 40 mg, Oral, Nightly  chlorhexidine, 15 mL, Mouth/Throat, Q12H  enoxaparin, 40 mg, Subcutaneous, Daily  folic acid, 1 mg, Oral, Daily  furosemide, 20 mg, Oral, Daily  insulin lispro, 2-9 Units, Subcutaneous, 4x Daily AC & at Bedtime  ketorolac, 15 mg, Intravenous, Once  metoprolol tartrate, 37.5 mg, Oral, Q12H  multivitamin with minerals, 1 tablet, Oral, Daily  mupirocin, , Each Nare, BID  pantoprazole, 40 mg, Oral, Daily  polyethylene glycol, 17 g, Oral, BID  potassium chloride, 10 mEq, Oral, Daily  senna-docusate sodium, 2 tablet, Oral, BID  thiamine, 100 mg, Oral, Daily             Assessment & Plan    - MV CAD including left main disease, EF 60% (stress test)--s/p CABG x 3 (LIMA to LAD, SVG to OM, SVG to RCA), EVH of the right leg on 12/3 ([place])  - HTN--stable  - HLD--statin  - Preop sinus bradycardia  - Tobacco abuse--35 pack year hx, PFTs noted, cessation d/w pt  - MRSA nasal colonization--mupirocin/ vanc     POD #4.  Overall doing much better.  Pain has resolved. Still requiring 2L O2, will " get a PA/Lat CXR this morning to better evaluate the left-sided pleural effusion. No change in weight overnight despite IV diuresis yesterday, discussed with pt and nursing importance of accurate I/O monitoring. Received oral diuretic this morning, replete electrolytes.  His BP remains stable, continue beta-blocker. Continue current bowel regimen, had BM yesterday.    Encourage mobilization, pulmonary toilet  Encourage PT/OT  On aspirin/statin/beta-blocker    ADDENDUM:  Rounded with Dr. Salmon. We will try to wean patient from O2 today. We will get walking oximetry and overnight oximetry. Possible dc tomorrow.    Bernice Delong, APRN  12/07/23  09:58 EST

## 2023-12-07 NOTE — PROGRESS NOTES
Exercise Oximetry    Patient Name:John Paul Tineo   MRN: [id number]   Date: 12/07/23             ROOM AIR BASELINE   SpO2% 92   Heart Rate 87   Blood Pressure      EXERCISE ON ROOM AIR SpO2% EXERCISE ON O2 @  LPM SpO2%   1 MINUTE 95 1 MINUTE    2 MINUTES 94 2 MINUTES    3 MINUTES 92 3 MINUTES    4 MINUTES 89 4 MINUTES    5 MINUTES 91 5 MINUTES    6 MINUTES 92 6 MINUTES               Distance Walked  6 mins Distance Walked   Dyspnea (Lexie Scale)   Dyspnea (Lexie Scale)   Fatigue (Lexie Scale)   Fatigue (Lexie Scale)   SpO2% Post Exercise  94 SpO2% Post Exercise   HR Post Exercise  92 HR Post Exercise   Time to Recovery   Time to Recovery     Comments: pt doesn't need home o2 @ this time. Room air sats walking stayed above 88%.

## 2023-12-07 NOTE — PROGRESS NOTES
CARDIOLOGY PROGRESS NOTE:    John Paul Tineo  58 y.o.  male  1965  [id number]      Referring Provider: Cardiac surgeon    Reason for follow-up: Coronary artery disease     Patient Care Team:  Morris Mercado PA-C as PCP - General (Physician Assistant)  Fred Lopez MD as Consulting Physician (Cardiology)    Subjective .  No chest pain or shortness of breath.  Ambulating well    Objective i sitting in chair comfortably     Review of Systems   Constitutional: Negative for fever and malaise/fatigue.   HENT:  Negative for ear pain and nosebleeds.    Eyes:  Negative for blurred vision and double vision.   Cardiovascular:  Negative for chest pain, dyspnea on exertion and palpitations.   Respiratory:  Negative for cough and shortness of breath.    Skin:  Negative for rash.   Musculoskeletal:  Negative for joint pain.   Gastrointestinal:  Negative for abdominal pain, nausea and vomiting.   Neurological:  Negative for focal weakness and headaches.   Psychiatric/Behavioral:  Negative for depression. The patient is not nervous/anxious.    All other systems reviewed and are negative.      Allergies: Patient has no known allergies.    Scheduled Meds:aspirin, 81 mg, Oral, Daily  atorvastatin, 40 mg, Oral, Nightly  chlorhexidine, 15 mL, Mouth/Throat, Q12H  enoxaparin, 40 mg, Subcutaneous, Daily  folic acid, 1 mg, Oral, Daily  furosemide, 20 mg, Oral, Daily  insulin lispro, 2-9 Units, Subcutaneous, 4x Daily AC & at Bedtime  ketorolac, 15 mg, Intravenous, Once  metoprolol tartrate, 37.5 mg, Oral, Q12H  multivitamin with minerals, 1 tablet, Oral, Daily  mupirocin, , Each Nare, BID  pantoprazole, 40 mg, Oral, Daily  polyethylene glycol, 17 g, Oral, BID  potassium chloride, 10 mEq, Oral, Daily  senna-docusate sodium, 2 tablet, Oral, BID  thiamine, 100 mg, Oral, Daily      Continuous Infusions:     PRN Meds:.  acetaminophen **OR** acetaminophen **OR** acetaminophen    Calcium Replacement - Follow Nurse / BPA Driven Protocol    " cyclobenzaprine    dextrose    dextrose    glucagon (human recombinant)    HYDROcodone-acetaminophen    LORazepam **OR** LORazepam **OR** LORazepam **OR** LORazepam **OR** LORazepam **OR** LORazepam    Magnesium Cardiology Dose Replacement - Follow Nurse / BPA Driven Protocol    [] Morphine **AND** naloxone    nitroglycerin    ondansetron    Phosphorus Replacement - Follow Nurse / BPA Driven Protocol    Potassium Replacement - Follow Nurse / BPA Driven Protocol        VITAL SIGNS  Vitals:    23 0800 23 0903 23 1000 23 1100   BP: 106/69 105/71 106/55 105/69   BP Location: Right arm   Right arm   Patient Position: Sitting   Sitting   Pulse: 89 96 92 90   Resp: 27   25   Temp: 98 °F (36.7 °C)   97.6 °F (36.4 °C)   TempSrc: Oral   Oral   SpO2: 92% (!) 89% 90% 94%   Weight:       Height:           Flowsheet Rows      Flowsheet Row First Filed Value   Admission Height 170.2 cm (67\") Documented at 202315   Admission Weight 90.9 kg (200 lb 6.4 oz) Documented at 2023 0715             TELEMETRY: Sinus rhythm    Physical Exam:  Constitutional:       Appearance: Well-developed.   Eyes:      General: No scleral icterus.     Conjunctiva/sclera: Conjunctivae normal.      Pupils: Pupils are equal, round, and reactive to light.   HENT:      Head: Normocephalic and atraumatic.   Neck:      Vascular: No carotid bruit or JVD.   Pulmonary:      Effort: Pulmonary effort is normal.      Breath sounds: Normal breath sounds. No wheezing. No rales.   Cardiovascular:      Normal rate. Regular rhythm.   Pulses:     Intact distal pulses.   Abdominal:      General: Bowel sounds are normal.      Palpations: Abdomen is soft.   Musculoskeletal: Normal range of motion.      Cervical back: Normal range of motion and neck supple. Skin:     General: Skin is warm and dry.      Findings: No rash.   Neurological:      Mental Status: Alert.      Comments: No focal deficits          Results Review:   I reviewed " the patient's new clinical results.  Lab Results (last 24 hours)       Procedure Component Value Units Date/Time    POC Glucose Once [[id number]]  (Normal) Collected: 12/07/23 1107    Specimen: Blood Updated: 12/07/23 1109     Glucose 101 mg/dL      Comment: Serial Number: [id number]:  [id number]       POC Glucose Once [[id number]]  (Normal) Collected: 12/07/23 0740    Specimen: Blood Updated: 12/07/23 0743     Glucose 84 mg/dL      Comment: Serial Number: [id number]:  [id number]       Basic Metabolic Panel [[id number]]  (Abnormal) Collected: 12/07/23 0537    Specimen: Blood Updated: 12/07/23 0633     Glucose 98 mg/dL      BUN 17 mg/dL      Creatinine 0.75 mg/dL      Sodium 137 mmol/L      Potassium 4.0 mmol/L      Chloride 96 mmol/L      CO2 30.0 mmol/L      Calcium 9.3 mg/dL      BUN/Creatinine Ratio 22.7     Anion Gap 11.0 mmol/L      eGFR 105.3 mL/min/1.73     Narrative:      GFR Normal >60  Chronic Kidney Disease <60  Kidney Failure <15      CBC (No Diff) [[id number]]  (Abnormal) Collected: 12/07/23 0537    Specimen: Blood Updated: 12/07/23 0601     WBC 9.90 10*3/mm3      RBC 3.45 10*6/mm3      Hemoglobin 11.7 g/dL      Hematocrit 34.4 %      MCV 99.7 fL      MCH 33.9 pg      MCHC 34.0 g/dL      RDW 12.5 %      RDW-SD 43.3 fl      MPV 8.8 fL      Platelets 298 10*3/mm3     POC Glucose Once [[id number]]  (Normal) Collected: 12/06/23 1935    Specimen: Blood Updated: 12/06/23 1937     Glucose 98 mg/dL      Comment: Serial Number: [id number]:  [id number]       POC Glucose Once [[id number]]  (Abnormal) Collected: 12/06/23 1641    Specimen: Blood Updated: 12/06/23 1642     Glucose 135 mg/dL      Comment: Serial Number: [id number]:  [id number]       Basic Metabolic Panel [[id number]]  (Abnormal) Collected: 12/06/23 0757    Specimen: Blood Updated: 12/06/23 1407     Glucose 130 mg/dL      BUN 15 mg/dL      Creatinine 0.61 mg/dL      Sodium 133 mmol/L      Potassium 3.6 mmol/L      Chloride 95 mmol/L       CO2 25.0 mmol/L      Calcium 9.5 mg/dL      BUN/Creatinine Ratio 24.6     Anion Gap 13.0 mmol/L      eGFR 112.0 mL/min/1.73     Narrative:      GFR Normal >60  Chronic Kidney Disease <60  Kidney Failure <15      POC Glucose Once [[id number]]  (Abnormal) Collected: 12/06/23 1228    Specimen: Blood Updated: 12/06/23 1229     Glucose 117 mg/dL      Comment: Serial Number: [id number]:  [id number]               Imaging Results (Last 24 Hours)       Procedure Component Value Units Date/Time    XR Chest PA & Lateral [[id number]] Collected: 12/07/23 1023     Updated: 12/07/23 1027    Narrative:      XR CHEST PA AND LATERAL    Date of Exam: 12/7/2023 10:19 AM EST    Indication: left pleural effusion    Comparison: 12/6/2023.    Findings:  There is continued mild atelectasis of the left lung base with small effusion. There is a minimal right effusion, similar. Heart size is normal. There are sternal suture wires.      Impression:      Impression:  Continued mild left basal atelectasis. Continued small effusions, greatest on the left.      Electronically Signed: Jessica Felix MD    12/7/2023 10:24 AM EST    Workstation ID: [id number]            EKG      I personally viewed and interpreted the patient's EKG/Telemetry data:    ECHOCARDIOGRAM:  Results for orders placed during the hospital encounter of 12/01/23    Adult Transthoracic Echo Complete w/ Color, Spectral and Contrast if Necessary Per Protocol    Interpretation Summary    Left ventricular ejection fraction appears to be 61 - 65%.    Saline test results are negative.       STRESS MYOVIEW:  Results for orders placed during the hospital encounter of 11/28/23    Stress Test With Myocardial Perfusion One Day    Interpretation Summary    Left ventricular ejection fraction is normal (Calculated EF = 60%).    High risk for ischemic heart disease.    Myocardial perfusion imaging indicates a large-sized, severe area of ischemia located in the anterior wall, apex and  septal wall.    Impressions are consistent with a high risk study.       CARDIAC CATHETERIZATION:  Results for orders placed during the hospital encounter of 12/01/23    Cardiac Catheterization/Vascular Study       OTHER:         Assessment & Plan     Angina with abnormal Myoview  Coronary artery disease  Patient had chest pain with risk factors for coronary disease and had an abnormal Myoview and hence he underwent cardiac catheterization  Patient had significant left main and three-vessel coronary disease and is scheduled for coronary bypass surgery  Cardiac surgeons have seen the patient is having workup done for pre-CABG.  Patient had an echocardiogram which showed normal LV systolic function  Patient is currently stable on medical therapy with aspirin statins and beta-blockers  Patient had a positive MRSA screen.  Patient had a vein mapping which is adequate on carotid duplex which is normal and his COVID screen is negative.  He also has PFTs which were within normal limits.  Patient underwent coronary artery bypass surgery x 3 vessels with a LIMA to LAD and SVG to the marginal branch and SVG to the RCA without any complications  Patient is extubated and sitting in chair  Patient's chest tubes are removed.  Patient's Dodson catheter has been removed  Patient is ambulating very well and tolerating medications    Hypertension  Patient is currently on beta-blockers and tolerating very well  Patient's metoprolol dose has been adjusted for his heart rate and blood pressure and is doing well okay to go home with    Hyperlipidemia  Patient is on Crestor and his lipid levels are followed by the primary care doctor    Home soon.    I discussed the patients findings and my recommendations with patient and nurse    Fred Lopez MD  12/07/23  11:43 EST

## 2023-12-07 NOTE — THERAPY TREATMENT NOTE
Subjective: Pt agreeable to therapeutic plan of care. Patient willing to move and able to recite pre-cautions for surgery.    Objective:     Bed mobility - N/A or Not attempted. Patient in chair upon entry.  Transfers - CGA  Ambulation - 400 feet CGA  Steps - 8 steps with rail  Phase 1 Cardiac Rehab Initiated    Sitting tolerance: >10min and supported  Standing tolerance: >10min and unsupported    Precautions:  Mid-sternal incision; avoid scapular retraction and depression.  Cardiovascular impairment post-sx; encourage energy conservation strategies.    Therapeutic Exercise: 10 reps UE and LE AROM in supported sitting    MET level equivalent: 2.0-3.0 (Unlimited sitting, ambulation on level ground <2mph, light housework)     Vitals: WNL    Pain: 4 VAS   Location: Back  Intervention for pain: Repositioned, Increased Activity, and Therapeutic Presence    Education: Provided education on the importance of mobility in the acute care setting, Verbal/Tactile Cues, Transfer Training, Gait Training, Energy conservation strategies, and Post-Op Precautions    Assessment: John Paul Tineo presents with functional mobility impairments which indicate the need for skilled intervention. Tolerating session today without incident. Will continue to follow and progress as tolerated. Patient ambulated well with therapy and able to complete 400 ft with 8 steps as well. Patients oxygen levels stayed WNL and was able to recite pre-cautions. Patient finished session in chair.    Plan/Recommendations:   No ongoing therapy recommended post-acute care. No therapy needs.. Pt requires no DME at discharge.     Pt desires Home with family assist at discharge. Pt cooperative; agreeable to therapeutic recommendations and plan of care.         Basic Mobility 6-click:  Rollin = Total, A lot = 2, A little = 3; 4 = None  Supine>Sit:   1 = Total, A lot = 2, A little = 3; 4 = None   Sit>Stand with arms:  1 = Total, A lot = 2, A little = 3; 4 =  None  Bed>Chair:   1 = Total, A lot = 2, A little = 3; 4 = None  Ambulate in room:  1 = Total, A lot = 2, A little = 3; 4 = None  3-5 Steps with railin = Total, A lot = 2, A little = 3; 4 = None  Score: 22    Modified [place]: N/A = No pre-op stroke/TIA    Post-Tx Position: Up in Chair, Alarms activated, and Call light and personal items within reach  PPE: gloves

## 2023-12-07 NOTE — PLAN OF CARE
Goal Outcome Evaluation:      Assessment: John Paul Tineo presents with functional mobility impairments which indicate the need for skilled intervention. Tolerating session today without incident. Will continue to follow and progress as tolerated. Patient ambulated well with therapy and able to complete 400 ft with 8 steps as well. Patients oxygen levels stayed WNL and was able to recite pre-cautions. Patient finished session in chair.    Plan/Recommendations:   No ongoing therapy recommended post-acute care. No therapy needs.. Pt requires no DME at discharge.     Pt desires Home with family assist at discharge. Pt cooperative; agreeable to therapeutic recommendations and plan of care.

## 2023-12-07 NOTE — CASE MANAGEMENT/SOCIAL WORK
Continued Stay Note  BHANU Rhodes     Patient Name: John Paul Tineo  MRN: [id number]  Today's Date: 12/7/2023    Admit Date: 12/1/2023    Plan: Plan to dc home with mother to assist.   Discharge Plan       Row Name 12/07/23 1406       Plan    Plan Plan to dc home with mother to assist.    Patient/Family in Agreement with Plan yes    Plan Comments Plan to dc home with mother to assist. DC Barriers:  POD #4, O2 2L, CXR pending, cardiac monitoring.             \    Expected Discharge Date and Time       Expected Discharge Date Expected Discharge Time    Dec 8, 2023               ADITI Roche RN  SIPS/ICU   O: [phone]  C: [phone]  [email or web address]

## 2023-12-08 ENCOUNTER — READMISSION MANAGEMENT (OUTPATIENT)
Dept: CALL CENTER | Facility: HOSPITAL | Age: 58
End: 2023-12-08
Payer: COMMERCIAL

## 2023-12-08 VITALS
RESPIRATION RATE: 21 BRPM | BODY MASS INDEX: 31.27 KG/M2 | TEMPERATURE: 97.9 F | SYSTOLIC BLOOD PRESSURE: 112 MMHG | HEIGHT: 67 IN | WEIGHT: 199.2 LBS | HEART RATE: 98 BPM | OXYGEN SATURATION: 91 % | DIASTOLIC BLOOD PRESSURE: 73 MMHG

## 2023-12-08 LAB
ANION GAP SERPL CALCULATED.3IONS-SCNC: 13 MMOL/L (ref 5–15)
BUN SERPL-MCNC: 16 MG/DL (ref 6–20)
BUN/CREAT SERPL: 21.9 (ref 7–25)
CALCIUM SPEC-SCNC: 9.4 MG/DL (ref 8.6–10.5)
CHLORIDE SERPL-SCNC: 95 MMOL/L (ref 98–107)
CO2 SERPL-SCNC: 29 MMOL/L (ref 22–29)
CREAT SERPL-MCNC: 0.73 MG/DL (ref 0.76–1.27)
DEPRECATED RDW RBC AUTO: 42.9 FL (ref 37–54)
EGFRCR SERPLBLD CKD-EPI 2021: 106.1 ML/MIN/1.73
ERYTHROCYTE [DISTWIDTH] IN BLOOD BY AUTOMATED COUNT: 12.4 % (ref 12.3–15.4)
GLUCOSE BLDC GLUCOMTR-MCNC: 125 MG/DL (ref 70–105)
GLUCOSE BLDC GLUCOMTR-MCNC: 84 MG/DL (ref 70–105)
GLUCOSE SERPL-MCNC: 116 MG/DL (ref 65–99)
HCT VFR BLD AUTO: 35.3 % (ref 37.5–51)
HGB BLD-MCNC: 11.9 G/DL (ref 13–17.7)
MCH RBC QN AUTO: 33.5 PG (ref 26.6–33)
MCHC RBC AUTO-ENTMCNC: 33.7 G/DL (ref 31.5–35.7)
MCV RBC AUTO: 99.3 FL (ref 79–97)
PLATELET # BLD AUTO: 366 10*3/MM3 (ref 140–450)
PMV BLD AUTO: 8.9 FL (ref 6–12)
POTASSIUM SERPL-SCNC: 3.8 MMOL/L (ref 3.5–5.2)
RBC # BLD AUTO: 3.55 10*6/MM3 (ref 4.14–5.8)
SODIUM SERPL-SCNC: 137 MMOL/L (ref 136–145)
WBC NRBC COR # BLD AUTO: 9.6 10*3/MM3 (ref 3.4–10.8)

## 2023-12-08 PROCEDURE — 80048 BASIC METABOLIC PNL TOTAL CA: CPT | Performed by: NURSE PRACTITIONER

## 2023-12-08 PROCEDURE — 94799 UNLISTED PULMONARY SVC/PX: CPT

## 2023-12-08 PROCEDURE — 85027 COMPLETE CBC AUTOMATED: CPT | Performed by: NURSE PRACTITIONER

## 2023-12-08 PROCEDURE — 82948 REAGENT STRIP/BLOOD GLUCOSE: CPT

## 2023-12-08 PROCEDURE — 99232 SBSQ HOSP IP/OBS MODERATE 35: CPT | Performed by: INTERNAL MEDICINE

## 2023-12-08 RX ORDER — FUROSEMIDE 20 MG/1
20 TABLET ORAL DAILY
Qty: 10 TABLET | Refills: 0 | Status: SHIPPED | OUTPATIENT
Start: 2023-12-09 | End: 2023-12-19

## 2023-12-08 RX ORDER — POTASSIUM CHLORIDE 750 MG/1
10 TABLET, FILM COATED, EXTENDED RELEASE ORAL DAILY
Qty: 10 TABLET | Refills: 0 | Status: SHIPPED | OUTPATIENT
Start: 2023-12-09 | End: 2023-12-19

## 2023-12-08 RX ORDER — ROSUVASTATIN CALCIUM 20 MG/1
20 TABLET, COATED ORAL DAILY
Qty: 90 TABLET | Refills: 0 | Status: SHIPPED | OUTPATIENT
Start: 2023-12-08

## 2023-12-08 RX ORDER — HYDROCODONE BITARTRATE AND ACETAMINOPHEN 5; 325 MG/1; MG/1
1 TABLET ORAL EVERY 4 HOURS PRN
Qty: 18 TABLET | Refills: 0 | Status: SHIPPED | OUTPATIENT
Start: 2023-12-08 | End: 2023-12-11

## 2023-12-08 RX ORDER — POTASSIUM CHLORIDE 20 MEQ/1
20 TABLET, EXTENDED RELEASE ORAL ONCE
Status: COMPLETED | OUTPATIENT
Start: 2023-12-08 | End: 2023-12-08

## 2023-12-08 RX ADMIN — POTASSIUM CHLORIDE 10 MEQ: 750 TABLET, EXTENDED RELEASE ORAL at 08:15

## 2023-12-08 RX ADMIN — ASPIRIN 81 MG: 81 TABLET, COATED ORAL at 08:14

## 2023-12-08 RX ADMIN — FUROSEMIDE 20 MG: 20 TABLET ORAL at 08:15

## 2023-12-08 RX ADMIN — MUPIROCIN: 20 OINTMENT TOPICAL at 08:15

## 2023-12-08 RX ADMIN — FOLIC ACID 1 MG: 1 TABLET ORAL at 08:15

## 2023-12-08 RX ADMIN — Medication 1 TABLET: at 08:14

## 2023-12-08 RX ADMIN — POTASSIUM CHLORIDE 20 MEQ: 1500 TABLET, EXTENDED RELEASE ORAL at 12:31

## 2023-12-08 RX ADMIN — Medication 100 MG: at 08:14

## 2023-12-08 RX ADMIN — 0.12% CHLORHEXIDINE GLUCONATE 15 ML: 1.2 RINSE ORAL at 08:15

## 2023-12-08 RX ADMIN — PANTOPRAZOLE SODIUM 40 MG: 40 TABLET, DELAYED RELEASE ORAL at 08:14

## 2023-12-08 RX ADMIN — METOPROLOL TARTRATE 25 MG: 25 TABLET, FILM COATED ORAL at 12:32

## 2023-12-08 NOTE — SIGNIFICANT NOTE
12/08/23 1139   OTHER   Discipline occupational therapist   Rehab Time/Intention   Session Not Performed other (see comments)  (Hospital dc pending)   Recommendation   OT - Next Appointment 12/11/23

## 2023-12-08 NOTE — DISCHARGE SUMMARY
[facility] Cardiac Surgery Discharge Summary    Date of Admission: 12/1/2023  5:59 AM   Date of Discharge:  12/8/2023    Discharge Diagnosis:   - MV CAD including left main disease, EF 60% (stress test)--s/p CABG x 3 (LIMA to LAD, SVG to OM, SVG to RCA), EVH of the right leg on 12/3 (Luis Antonio)  - HTN--stable  - HLD--statin  - Preop sinus bradycardia  - Tobacco abuse--35 pack year hx, PFTs noted, cessation d/w pt  - MRSA nasal colonization--mupirocin/ vanc   - post op night time respiratory insufficiency, hypoxia    Presenting Problem/History of Present Illness  Angina at rest [I20.89]  Abnormal nuclear stress test [R94.39]  CAD (coronary artery disease) [I25.10]     Hospital Course  John Paul Tineo is a 58 y.o. male with CAD.  He presented to [facility] on 12/1/2023 for an elective heart cath following an abnormal stress test in late November.  He was noted to have multivessel CAD and on 12/3/2023, he underwent CABG x 3 (LIMA to LAD, SVG to OM, SVG to RCA), EVH of the right leg with Dr. Salmon (See op note for full report).  Postoperatively, he did well. He was extubated on day of surgery and weaned from pressors.  Chest tubes, Dodson, central line, and epicardial wires were removed without issue in the usual fashion. He was noted to have nocturnal hypoxia throughout his post op course, an overnight oximetry test was performed and showed he needed 2 L of O2 nasal cannula to keep his oxygen saturation over 90%.  A walking oximetry test was passed on room air.  An order was placed for outpatient O2 at nighttime only.  He is tolerating the current medication regimen.  He was evaluated by physical therapy and recommended home with family assist at discharge.  At the time of discharge, the patient was tolerating oral intake, voiding on own, and his bowel function has returned.  He was deemed ready for discharge on POD#5.  Sternal precautions reviewed, as were signs and symptoms of sternal wound  infection.  Follow-up in the office in 2 weeks.      Procedures Performed  Procedure(s):  CORONARY ARTERY BYPASS GRAFTING       Consults:   Consults       Date and Time Order Name Status Description    12/3/2023 10:21 AM Inpatient Cardiology Consult      12/1/2023  9:54 AM Inpatient Cardiothoracic Surgery Consult Completed             Pertinent Test Results:    Lab Results   Component Value Date    WBC 9.60 12/08/2023    HGB 11.9 (L) 12/08/2023    HCT 35.3 (L) 12/08/2023    MCV 99.3 (H) 12/08/2023     12/08/2023      Lab Results   Component Value Date    GLUCOSE 116 (H) 12/08/2023    CALCIUM 9.4 12/08/2023     12/08/2023    K 3.8 12/08/2023    CO2 29.0 12/08/2023    CL 95 (L) 12/08/2023    BUN 16 12/08/2023    CREATININE 0.73 (L) 12/08/2023    EGFRIFAFRI >60 08/24/2017    BCR 21.9 12/08/2023    ANIONGAP 13.0 12/08/2023     Lab Results   Component Value Date    INR 1.06 12/04/2023    PROTIME 11.5 12/04/2023         Condition on Discharge:    Stable    Vital Signs  Temp:  [97.5 °F (36.4 °C)-98.5 °F (36.9 °C)] 98.2 °F (36.8 °C)  Heart Rate:  [] 94  Resp:  [22-28] 22  BP: ()/(46-74) 106/65      Discharge Disposition  Home or Self Care      Discharge Medications     Discharge Medications        New Medications        Instructions Start Date   furosemide 20 MG tablet  Commonly known as: LASIX   20 mg, Oral, Daily   Start Date: December 9, 2023     HYDROcodone-acetaminophen 5-325 MG per tablet  Commonly known as: NORCO   1 tablet, Oral, Every 4 Hours PRN      metoprolol tartrate 25 MG tablet  Commonly known as: LOPRESSOR   25 mg, Oral, Every 12 Hours Scheduled      potassium chloride 10 MEQ CR tablet   10 mEq, Oral, Daily   Start Date: December 9, 2023            Changes to Medications        Instructions Start Date   rosuvastatin 5 MG tablet  Commonly known as: CRESTOR  What changed: how much to take   20 mg, Oral, Daily             Continue These Medications        Instructions Start Date    aspirin 81 MG EC tablet   81 mg, Oral, Daily             Stop These Medications      metoprolol succinate XL 25 MG 24 hr tablet  Commonly known as: TOPROL-XL     nitroglycerin 0.4 MG SL tablet  Commonly known as: NITROSTAT              Discharge Diet: Cardiac diet    Activity at Discharge:   1. No driving for 2 weeks and off narcotic pain medications.  2. Shower daily. Clean incisions with warm water and antibacterial soap only. Do not put any lotion or ointments on incisions.  3. Ambulate for 10 minutes at least 3 times a day.  4. No heavy lifting > 10lbs until seen in office.   5. Take all medications as prescribed.       Follow-up Appointments  Future Appointments   Date Time Provider Department Center   12/28/2023  1:15 PM Anant-Tammy Freire APRN [facility]   5/15/2024  3:00 PM Fred Lopez MD [facility]     Additional Instructions for the Follow-ups that You Need to Schedule       Call MD With Problems / Concerns   As directed      Instructions:  Call office at [phone] for any drainage, increased redness, or fever over 100.5    Order Comments: Instructions:  Call office at [phone] for any drainage, increased redness, or fever over 100.5         Discharge Follow-up with PCP   As directed       Currently Documented PCP:    Morris Mercado PA-C    PCP Phone Number:    [phone]     Follow Up Details: in 1 week        Discharge Follow-up with Specialty: Cardiologist PEDRO/PA; 1 Week   As directed      Specialty: Cardiologist PEDRO/PA   Follow Up: 1 Week   Follow Up Details: bring all prescription bottles to appointment, call for appointment        Discharge Follow-up with Specified Provider: Cardiologist; 1 Month   As directed      To: Cardiologist   Follow Up: 1 Month   Follow Up Details: call for appointment, bring all medication bottles to appointment        Discharge Follow-up with Specified Provider: Dr. Salmon's nurse practitioner  12/28/23 @ 1:15 pm   As  directed      To: Dr. Salmon's nurse practitioner  12/28/23 @ 1:15 pm   Follow Up Details: bring all current medications to appointment                 PEDRO Chávez  12/08/23  10:03 EST

## 2023-12-08 NOTE — PAYOR COMM NOTE
"This is discharge notification for Amanuel John Paul SANTOS  Reference/Auth # [id number]   Pt discharged routine to home on 12/8/23.    EXTENDED AUTHORIZATION PENDING: Final 2 days pending approval 12/6 to 12/8/23.    Please call or fax determination to contact below.   Thank you.    Martha Smith, RN, BSN, CCM  Utilization Review Nurse  [facility]  Direct & confidential phone # [phone]  Fax # [phone]        John Paul Vital (58 y.o. Male)       Date of Birth   1965    Social Security Number       Address   [address] [place] [address] [place] [address]    Home Phone   [phone]    MRN   [id number]       Anglican   None    Marital Status                               Admission Date   12/1/23    Admission Type   Elective    Admitting Provider   Fred Lopez MD    Attending Provider       Department, Room/Bed   Psychiatric CARDIOVASCULAR CARE UNIT, 2210/1       Discharge Date   12/8/2023    Discharge Disposition   Home or Self Care    Discharge Destination                                 Attending Provider: (none)   Allergies: No Known Allergies    Isolation: Contact   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)   Code Status: CPR    Ht: 170.2 cm (67\")   Wt: 90.4 kg (199 lb 3.2 oz)    Admission Cmt: None   Principal Problem: CAD (coronary artery disease) [I25.10]                   Active Insurance as of 12/1/2023       Primary Coverage       Payor Plan Insurance Group Employer/Plan Group    [facility] [employer] PPO [id number]       Payor Plan Address Payor Plan Phone Number Payor Plan Fax Number Effective Dates    [address]   10/1/2023 - None Entered    [facility] [id number]         Subscriber Name Subscriber Birth Date Member ID       JOHN PAUL VITAL TOM 1965 [id number]                     Emergency Contacts        (Rel.) Home Phone Work Phone Mobile Phone    AMANUELLUPILLO (Mother) -- -- [phone]    GUSTAVO VITAL (Daughter) -- -- " [phone]              Operative/Procedure Notes (last 48 hours)  Notes from 12/06/23 1544 through 12/08/23 1544   No notes of this type exist for this encounter.          Physician Progress Notes (last 48 hours)        Fred Lopez MD at 12/08/23 1150          CARDIOLOGY PROGRESS NOTE:    John Paul Tineo  58 y.o.  male  1965  [id number]      Referring Provider: Cardiac surgeon    Reason for follow-up: Coronary artery disease     Patient Care Team:  Morris Mercado PA-C as PCP - General (Physician Assistant)  Fred Lopez MD as Consulting Physician (Cardiology)    Subjective .  No chest pain or shortness of breath.  Ambulating well    Objective i sitting in chair comfortably     Review of Systems   Constitutional: Negative for fever and malaise/fatigue.   HENT:  Negative for ear pain and nosebleeds.    Eyes:  Negative for blurred vision and double vision.   Cardiovascular:  Negative for chest pain, dyspnea on exertion and palpitations.   Respiratory:  Negative for cough and shortness of breath.    Skin:  Negative for rash.   Musculoskeletal:  Negative for joint pain.   Gastrointestinal:  Negative for abdominal pain, nausea and vomiting.   Neurological:  Negative for focal weakness and headaches.   Psychiatric/Behavioral:  Negative for depression. The patient is not nervous/anxious.    All other systems reviewed and are negative.      Allergies: Patient has no known allergies.    Scheduled Meds:aspirin, 81 mg, Oral, Daily  atorvastatin, 40 mg, Oral, Nightly  chlorhexidine, 15 mL, Mouth/Throat, Q12H  enoxaparin, 40 mg, Subcutaneous, Daily  folic acid, 1 mg, Oral, Daily  furosemide, 20 mg, Oral, Daily  insulin lispro, 2-9 Units, Subcutaneous, 4x Daily AC & at Bedtime  metoprolol tartrate, 25 mg, Oral, Q12H  multivitamin with minerals, 1 tablet, Oral, Daily  pantoprazole, 40 mg, Oral, Daily  polyethylene glycol, 17 g, Oral, BID  potassium chloride ER, 20 mEq, Oral, Once  potassium chloride, 10 mEq, Oral,  "Daily  senna-docusate sodium, 2 tablet, Oral, BID  thiamine, 100 mg, Oral, Daily      Continuous Infusions:     PRN Meds:.  acetaminophen **OR** acetaminophen **OR** acetaminophen    Calcium Replacement - Follow Nurse / BPA Driven Protocol    cyclobenzaprine    dextrose    dextrose    glucagon (human recombinant)    HYDROcodone-acetaminophen    LORazepam **OR** LORazepam **OR** LORazepam **OR** LORazepam **OR** LORazepam **OR** LORazepam    Magnesium Cardiology Dose Replacement - Follow Nurse / BPA Driven Protocol    [] Morphine **AND** naloxone    nitroglycerin    ondansetron    Phosphorus Replacement - Follow Nurse / BPA Driven Protocol    Potassium Replacement - Follow Nurse / BPA Driven Protocol        VITAL SIGNS  Vitals:    23 0600 23 0700 23 0800 23 1129   BP: 93/54 109/71 106/65 117/74   BP Location:   Right arm Left arm   Patient Position:   Sitting Sitting   Pulse: 89 84 94 93   Resp:   22 21   Temp:   98.2 °F (36.8 °C) 97.9 °F (36.6 °C)   TempSrc:   Oral Oral   SpO2: 91% 91% 91%    Weight:       Height:           Flowsheet Rows      Flowsheet Row First Filed Value   Admission Height 170.2 cm (67\") Documented at 2023   Admission Weight 90.9 kg (200 lb 6.4 oz) Documented at 2023             TELEMETRY: Sinus rhythm    Physical Exam:  Constitutional:       Appearance: Well-developed.   Eyes:      General: No scleral icterus.     Conjunctiva/sclera: Conjunctivae normal.      Pupils: Pupils are equal, round, and reactive to light.   HENT:      Head: Normocephalic and atraumatic.   Neck:      Vascular: No carotid bruit or JVD.   Pulmonary:      Effort: Pulmonary effort is normal.      Breath sounds: Normal breath sounds. No wheezing. No rales.   Cardiovascular:      Normal rate. Regular rhythm.   Pulses:     Intact distal pulses.   Abdominal:      General: Bowel sounds are normal.      Palpations: Abdomen is soft.   Musculoskeletal: Normal range of motion.     "  Cervical back: Normal range of motion and neck supple. Skin:     General: Skin is warm and dry.      Findings: No rash.   Neurological:      Mental Status: Alert.      Comments: No focal deficits          Results Review:   I reviewed the patient's new clinical results.  Lab Results (last 24 hours)       Procedure Component Value Units Date/Time    POC Glucose Once [[id number]]  (Normal) Collected: 12/08/23 1119    Specimen: Blood Updated: 12/08/23 1123     Glucose 84 mg/dL      Comment: Serial Number: [id number]:  [id number]       POC Glucose Once [[id number]]  (Abnormal) Collected: 12/08/23 0800    Specimen: Blood Updated: 12/08/23 0803     Glucose 125 mg/dL      Comment: Serial Number: [id number]:  [id number]       Basic Metabolic Panel [[id number]]  (Abnormal) Collected: 12/08/23 0653    Specimen: Blood Updated: 12/08/23 0800     Glucose 116 mg/dL      BUN 16 mg/dL      Creatinine 0.73 mg/dL      Sodium 137 mmol/L      Potassium 3.8 mmol/L      Chloride 95 mmol/L      CO2 29.0 mmol/L      Calcium 9.4 mg/dL      BUN/Creatinine Ratio 21.9     Anion Gap 13.0 mmol/L      eGFR 106.1 mL/min/1.73     Narrative:      GFR Normal >60  Chronic Kidney Disease <60  Kidney Failure <15      CBC (No Diff) [[id number]]  (Abnormal) Collected: 12/08/23 0653    Specimen: Blood Updated: 12/08/23 0726     WBC 9.60 10*3/mm3      RBC 3.55 10*6/mm3      Hemoglobin 11.9 g/dL      Hematocrit 35.3 %      MCV 99.3 fL      MCH 33.5 pg      MCHC 33.7 g/dL      RDW 12.4 %      RDW-SD 42.9 fl      MPV 8.9 fL      Platelets 366 10*3/mm3     POC Glucose Once [[id number]]  (Abnormal) Collected: 12/07/23 2018    Specimen: Blood Updated: 12/07/23 2019     Glucose 129 mg/dL      Comment: Serial Number: [id number]:  [id number]       POC Glucose Once [[id number]]  (Normal) Collected: 12/07/23 1606    Specimen: Blood Updated: 12/07/23 1608     Glucose 82 mg/dL      Comment: Serial Number: [id number]:  [id number]               Imaging  Results (Last 24 Hours)       ** No results found for the last 24 hours. **            EKG      I personally viewed and interpreted the patient's EKG/Telemetry data:    ECHOCARDIOGRAM:  Results for orders placed during the hospital encounter of 12/01/23    Adult Transthoracic Echo Complete w/ Color, Spectral and Contrast if Necessary Per Protocol    Interpretation Summary    Left ventricular ejection fraction appears to be 61 - 65%.    Saline test results are negative.       STRESS MYOVIEW:  Results for orders placed during the hospital encounter of 11/28/23    Stress Test With Myocardial Perfusion One Day    Interpretation Summary    Left ventricular ejection fraction is normal (Calculated EF = 60%).    High risk for ischemic heart disease.    Myocardial perfusion imaging indicates a large-sized, severe area of ischemia located in the anterior wall, apex and septal wall.    Impressions are consistent with a high risk study.       CARDIAC CATHETERIZATION:  Results for orders placed during the hospital encounter of 12/01/23    Cardiac Catheterization/Vascular Study       OTHER:         Assessment & Plan     Angina with abnormal Myoview  Coronary artery disease  Patient had chest pain with risk factors for coronary disease and had an abnormal Myoview and hence he underwent cardiac catheterization  Patient had significant left main and three-vessel coronary disease and is scheduled for coronary bypass surgery  Cardiac surgeons have seen the patient is having workup done for pre-CABG.  Patient had an echocardiogram which showed normal LV systolic function  Patient is currently stable on medical therapy with aspirin statins and beta-blockers  Patient had a positive MRSA screen.  Patient had a vein mapping which is adequate on carotid duplex which is normal and his COVID screen is negative.  He also has PFTs which were within normal limits.  Patient underwent coronary artery bypass surgery x 3 vessels with a LIMA to LAD  and SVG to the marginal branch and SVG to the RCA without any complications  Patient is extubated and sitting in chair  Patient's chest tubes are removed.  Patient's Dodson catheter has been removed  Patient is ambulating very well and tolerating medications  Patient is being discharged to home today and will follow in the office.    Hypertension  Patient is currently on beta-blockers and tolerating very well  Patient's metoprolol dose has been adjusted for his heart rate and blood pressure and is doing well okay to go home with    Hyperlipidemia  Patient is on Crestor and his lipid levels are followed by the primary care doctor    Home soon.    I discussed the patients findings and my recommendations with patient and nurse    Fred Lopez MD  12/08/23  11:50 EST                Electronically signed by Fred Lopez MD at 12/08/23 1150       Fred Lopez MD at 12/07/23 1143          CARDIOLOGY PROGRESS NOTE:    John Paul Tineo  58 y.o.  male  1965  [id number]      Referring Provider: Cardiac surgeon    Reason for follow-up: Coronary artery disease     Patient Care Team:  Morris Mercado PA-C as PCP - General (Physician Assistant)  Fred Lopez MD as Consulting Physician (Cardiology)    Subjective .  No chest pain or shortness of breath.  Ambulating well    Objective i sitting in chair comfortably     Review of Systems   Constitutional: Negative for fever and malaise/fatigue.   HENT:  Negative for ear pain and nosebleeds.    Eyes:  Negative for blurred vision and double vision.   Cardiovascular:  Negative for chest pain, dyspnea on exertion and palpitations.   Respiratory:  Negative for cough and shortness of breath.    Skin:  Negative for rash.   Musculoskeletal:  Negative for joint pain.   Gastrointestinal:  Negative for abdominal pain, nausea and vomiting.   Neurological:  Negative for focal weakness and headaches.   Psychiatric/Behavioral:  Negative for depression. The patient is not  "nervous/anxious.    All other systems reviewed and are negative.      Allergies: Patient has no known allergies.    Scheduled Meds:aspirin, 81 mg, Oral, Daily  atorvastatin, 40 mg, Oral, Nightly  chlorhexidine, 15 mL, Mouth/Throat, Q12H  enoxaparin, 40 mg, Subcutaneous, Daily  folic acid, 1 mg, Oral, Daily  furosemide, 20 mg, Oral, Daily  insulin lispro, 2-9 Units, Subcutaneous, 4x Daily AC & at Bedtime  ketorolac, 15 mg, Intravenous, Once  metoprolol tartrate, 37.5 mg, Oral, Q12H  multivitamin with minerals, 1 tablet, Oral, Daily  mupirocin, , Each Nare, BID  pantoprazole, 40 mg, Oral, Daily  polyethylene glycol, 17 g, Oral, BID  potassium chloride, 10 mEq, Oral, Daily  senna-docusate sodium, 2 tablet, Oral, BID  thiamine, 100 mg, Oral, Daily      Continuous Infusions:     PRN Meds:.  acetaminophen **OR** acetaminophen **OR** acetaminophen    Calcium Replacement - Follow Nurse / BPA Driven Protocol    cyclobenzaprine    dextrose    dextrose    glucagon (human recombinant)    HYDROcodone-acetaminophen    LORazepam **OR** LORazepam **OR** LORazepam **OR** LORazepam **OR** LORazepam **OR** LORazepam    Magnesium Cardiology Dose Replacement - Follow Nurse / BPA Driven Protocol    [] Morphine **AND** naloxone    nitroglycerin    ondansetron    Phosphorus Replacement - Follow Nurse / BPA Driven Protocol    Potassium Replacement - Follow Nurse / BPA Driven Protocol        VITAL SIGNS  Vitals:    23 0800 23 0903 23 1000 23 1100   BP: 106/69 105/71 106/55 105/69   BP Location: Right arm   Right arm   Patient Position: Sitting   Sitting   Pulse: 89 96 92 90   Resp: 27   25   Temp: 98 °F (36.7 °C)   97.6 °F (36.4 °C)   TempSrc: Oral   Oral   SpO2: 92% (!) 89% 90% 94%   Weight:       Height:           Flowsheet Rows      Flowsheet Row First Filed Value   Admission Height 170.2 cm (67\") Documented at 2023   Admission Weight 90.9 kg (200 lb 6.4 oz) Documented at 2023 0715           "   TELEMETRY: Sinus rhythm    Physical Exam:  Constitutional:       Appearance: Well-developed.   Eyes:      General: No scleral icterus.     Conjunctiva/sclera: Conjunctivae normal.      Pupils: Pupils are equal, round, and reactive to light.   HENT:      Head: Normocephalic and atraumatic.   Neck:      Vascular: No carotid bruit or JVD.   Pulmonary:      Effort: Pulmonary effort is normal.      Breath sounds: Normal breath sounds. No wheezing. No rales.   Cardiovascular:      Normal rate. Regular rhythm.   Pulses:     Intact distal pulses.   Abdominal:      General: Bowel sounds are normal.      Palpations: Abdomen is soft.   Musculoskeletal: Normal range of motion.      Cervical back: Normal range of motion and neck supple. Skin:     General: Skin is warm and dry.      Findings: No rash.   Neurological:      Mental Status: Alert.      Comments: No focal deficits          Results Review:   I reviewed the patient's new clinical results.  Lab Results (last 24 hours)       Procedure Component Value Units Date/Time    POC Glucose Once [[id number]]  (Normal) Collected: 12/07/23 1107    Specimen: Blood Updated: 12/07/23 1109     Glucose 101 mg/dL      Comment: Serial Number: [id number]:  [id number]       POC Glucose Once [[id number]]  (Normal) Collected: 12/07/23 0740    Specimen: Blood Updated: 12/07/23 0743     Glucose 84 mg/dL      Comment: Serial Number: [id number]:  [id number]       Basic Metabolic Panel [[id number]]  (Abnormal) Collected: 12/07/23 0537    Specimen: Blood Updated: 12/07/23 0633     Glucose 98 mg/dL      BUN 17 mg/dL      Creatinine 0.75 mg/dL      Sodium 137 mmol/L      Potassium 4.0 mmol/L      Chloride 96 mmol/L      CO2 30.0 mmol/L      Calcium 9.3 mg/dL      BUN/Creatinine Ratio 22.7     Anion Gap 11.0 mmol/L      eGFR 105.3 mL/min/1.73     Narrative:      GFR Normal >60  Chronic Kidney Disease <60  Kidney Failure <15      CBC (No Diff) [[id number]]  (Abnormal) Collected: 12/07/23 0537     Specimen: Blood Updated: 12/07/23 0601     WBC 9.90 10*3/mm3      RBC 3.45 10*6/mm3      Hemoglobin 11.7 g/dL      Hematocrit 34.4 %      MCV 99.7 fL      MCH 33.9 pg      MCHC 34.0 g/dL      RDW 12.5 %      RDW-SD 43.3 fl      MPV 8.8 fL      Platelets 298 10*3/mm3     POC Glucose Once [[id number]]  (Normal) Collected: 12/06/23 1935    Specimen: Blood Updated: 12/06/23 1937     Glucose 98 mg/dL      Comment: Serial Number: [id number]:  [id number]       POC Glucose Once [[id number]]  (Abnormal) Collected: 12/06/23 1641    Specimen: Blood Updated: 12/06/23 1642     Glucose 135 mg/dL      Comment: Serial Number: [id number]:  [id number]       Basic Metabolic Panel [[id number]]  (Abnormal) Collected: 12/06/23 0757    Specimen: Blood Updated: 12/06/23 1407     Glucose 130 mg/dL      BUN 15 mg/dL      Creatinine 0.61 mg/dL      Sodium 133 mmol/L      Potassium 3.6 mmol/L      Chloride 95 mmol/L      CO2 25.0 mmol/L      Calcium 9.5 mg/dL      BUN/Creatinine Ratio 24.6     Anion Gap 13.0 mmol/L      eGFR 112.0 mL/min/1.73     Narrative:      GFR Normal >60  Chronic Kidney Disease <60  Kidney Failure <15      POC Glucose Once [[id number]]  (Abnormal) Collected: 12/06/23 1228    Specimen: Blood Updated: 12/06/23 1229     Glucose 117 mg/dL      Comment: Serial Number: [id number]:  [id number]               Imaging Results (Last 24 Hours)       Procedure Component Value Units Date/Time    XR Chest PA & Lateral [[id number]] Collected: 12/07/23 1023     Updated: 12/07/23 1027    Narrative:      XR CHEST PA AND LATERAL    Date of Exam: 12/7/2023 10:19 AM EST    Indication: left pleural effusion    Comparison: 12/6/2023.    Findings:  There is continued mild atelectasis of the left lung base with small effusion. There is a minimal right effusion, similar. Heart size is normal. There are sternal suture wires.      Impression:      Impression:  Continued mild left basal atelectasis. Continued small effusions, greatest  on the left.      Electronically Signed: Jessica Felix MD    12/7/2023 10:24 AM EST    Workstation ID: [id number]            EKG      I personally viewed and interpreted the patient's EKG/Telemetry data:    ECHOCARDIOGRAM:  Results for orders placed during the hospital encounter of 12/01/23    Adult Transthoracic Echo Complete w/ Color, Spectral and Contrast if Necessary Per Protocol    Interpretation Summary    Left ventricular ejection fraction appears to be 61 - 65%.    Saline test results are negative.       STRESS MYOVIEW:  Results for orders placed during the hospital encounter of 11/28/23    Stress Test With Myocardial Perfusion One Day    Interpretation Summary    Left ventricular ejection fraction is normal (Calculated EF = 60%).    High risk for ischemic heart disease.    Myocardial perfusion imaging indicates a large-sized, severe area of ischemia located in the anterior wall, apex and septal wall.    Impressions are consistent with a high risk study.       CARDIAC CATHETERIZATION:  Results for orders placed during the hospital encounter of 12/01/23    Cardiac Catheterization/Vascular Study       OTHER:         Assessment & Plan     Angina with abnormal Myoview  Coronary artery disease  Patient had chest pain with risk factors for coronary disease and had an abnormal Myoview and hence he underwent cardiac catheterization  Patient had significant left main and three-vessel coronary disease and is scheduled for coronary bypass surgery  Cardiac surgeons have seen the patient is having workup done for pre-CABG.  Patient had an echocardiogram which showed normal LV systolic function  Patient is currently stable on medical therapy with aspirin statins and beta-blockers  Patient had a positive MRSA screen.  Patient had a vein mapping which is adequate on carotid duplex which is normal and his COVID screen is negative.  He also has PFTs which were within normal limits.  Patient underwent coronary artery bypass  surgery x 3 vessels with a LIMA to LAD and SVG to the marginal branch and SVG to the RCA without any complications  Patient is extubated and sitting in chair  Patient's chest tubes are removed.  Patient's Dodson catheter has been removed  Patient is ambulating very well and tolerating medications    Hypertension  Patient is currently on beta-blockers and tolerating very well  Patient's metoprolol dose has been adjusted for his heart rate and blood pressure and is doing well okay to go home with    Hyperlipidemia  Patient is on Crestor and his lipid levels are followed by the primary care doctor    Home soon.    I discussed the patients findings and my recommendations with patient and nurse    Fred Lopez MD  12/07/23  11:43 EST                Electronically signed by Fred Lopez MD at 12/07/23 1146       Bernice Delong APRN at 12/07/23 0901           LOS: 6 days   Patient Care Team:  Morris Mercado PA-C as PCP - General (Physician Assistant)  Fred Lopez MD as Consulting Physician (Cardiology)    Chief Complaint: s/p CABG x 3 (LIMA to LAD, SVG to OM, SVG to RCA), EVH of the right leg    Subjective:  Symptoms:  No shortness of breath or chest pain.    Diet:  Adequate intake.  No nausea or vomiting.    Activity level: Impaired due to weakness.    Pain:  He reports no pain.      Patient reports feeling much better today. Had BM yesterday. Breathing better but still on 2 L oxygen NC.    Objective     Vital Signs  Temp:  [97.6 °F (36.4 °C)-98.5 °F (36.9 °C)] 98 °F (36.7 °C)  Heart Rate:  [78-98] 96  Resp:  [20-35] 27  BP: ()/(51-79) 105/71  Body mass index is 31.79 kg/m².    Intake/Output Summary (Last 24 hours) at 12/7/2023 0958  Last data filed at 12/7/2023 0830  Gross per 24 hour   Intake 480 ml   Output --   Net 480 ml     I/O this shift:  In: 240 [P.O.:240]  Out: -       Wt Readings from Last 3 Encounters:   12/07/23 92.1 kg (203 lb)   11/15/23 91.6 kg (202 lb)   08/28/20 92.9 kg (204 lb  "12.9 oz)       Flowsheet Rows      Flowsheet Row First Filed Value   Admission Height 170.2 cm (67\") Documented at 12/01/2023 0715   Admission Weight 90.9 kg (200 lb 6.4 oz) Documented at 12/01/2023 0715            Objective:  General Appearance:  In no acute distress and comfortable (Up in chair).    Vital signs: (most recent): Blood pressure 105/69, pulse 90, temperature 97.6 °F (36.4 °C), temperature source Oral, resp. rate 25, height 170.2 cm (67\"), weight 92.1 kg (203 lb), SpO2 94%.  Vital signs are normal.  No fever.    Output: Producing urine and producing stool.    Lungs:  Normal effort and normal respiratory rate.  No decreased breath sounds (bases bilaterally).  (On 2 L NC)  Heart: Normal rate.  Regular rhythm.  (SR on tele monitor, HR 80-90s)  Abdomen: Abdomen is soft and non-distended.  Bowel sounds are normal.     Extremities: Normal range of motion.  There is no dependent edema.    Neurological: Patient is alert and oriented to person, place and time.    Skin:  Warm and dry.  (Sternal incision, CDI )          Results Review:        Results from last 7 days   Lab Units 12/07/23  0537 12/06/23  0757 12/05/23  0520   WBC 10*3/mm3 9.90 13.20* 15.50*   HEMOGLOBIN g/dL 11.7* 12.8* 12.3*   HEMATOCRIT % 34.4* 37.8 35.6*   PLATELETS 10*3/mm3 298 258 203         PT/INR:    No results found for: \"PROTIME\"  /  No results found for: \"INR\"      Results from last 7 days   Lab Units 12/07/23  0537 12/06/23  0757 12/05/23  1335 12/05/23  0520   SODIUM mmol/L 137 133*  --  135*   POTASSIUM mmol/L 4.0 3.6 4.1 3.8   CHLORIDE mmol/L 96* 95*  --  98   CO2 mmol/L 30.0* 25.0  --  25.0   BUN mg/dL 17 15  --  10   CREATININE mg/dL 0.75* 0.61*  --  0.64*   GLUCOSE mg/dL 98 130*  --  92   CALCIUM mg/dL 9.3 9.5  --  9.2         Scheduled Meds:  aspirin, 81 mg, Oral, Daily  atorvastatin, 40 mg, Oral, Nightly  chlorhexidine, 15 mL, Mouth/Throat, Q12H  enoxaparin, 40 mg, Subcutaneous, Daily  folic acid, 1 mg, Oral, Daily  furosemide, " 20 mg, Oral, Daily  insulin lispro, 2-9 Units, Subcutaneous, 4x Daily AC & at Bedtime  ketorolac, 15 mg, Intravenous, Once  metoprolol tartrate, 37.5 mg, Oral, Q12H  multivitamin with minerals, 1 tablet, Oral, Daily  mupirocin, , Each Nare, BID  pantoprazole, 40 mg, Oral, Daily  polyethylene glycol, 17 g, Oral, BID  potassium chloride, 10 mEq, Oral, Daily  senna-docusate sodium, 2 tablet, Oral, BID  thiamine, 100 mg, Oral, Daily             Assessment & Plan    - MV CAD including left main disease, EF 60% (stress test)--s/p CABG x 3 (LIMA to LAD, SVG to OM, SVG to RCA), EVH of the right leg on 12/3 (Luis Antonio)  - HTN--stable  - HLD--statin  - Preop sinus bradycardia  - Tobacco abuse--35 pack year hx, PFTs noted, cessation d/w pt  - MRSA nasal colonization--mupirocin/ vanc     POD #4.  Overall doing much better.  Pain has resolved. Still requiring 2L O2, will get a PA/Lat CXR this morning to better evaluate the left-sided pleural effusion. No change in weight overnight despite IV diuresis yesterday, discussed with pt and nursing importance of accurate I/O monitoring. Received oral diuretic this morning, replete electrolytes.  His BP remains stable, continue beta-blocker. Continue current bowel regimen, had BM yesterday.    Encourage mobilization, pulmonary toilet  Encourage PT/OT  On aspirin/statin/beta-blocker    ADDENDUM:  Rounded with Dr. Salmon. We will try to wean patient from O2 today. We will get walking oximetry and overnight oximetry. Possible dc tomorrow.    PEDRO Chávez  12/07/23  09:58 EST    Electronically signed by Bernice Delong APRN at 12/08/23 0956       Consult Notes (last 48 hours)  Notes from 12/06/23 1544 through 12/08/23 1544   No notes of this type exist for this encounter.          Discharge Summary        Bernice Delong APRN at 12/08/23 1003          [facility] Cardiac Surgery Discharge Summary    Date of Admission: 12/1/2023  5:59 AM   Date of Discharge:   12/8/2023    Discharge Diagnosis:   - MV CAD including left main disease, EF 60% (stress test)--s/p CABG x 3 (LIMA to LAD, SVG to OM, SVG to RCA), EVH of the right leg on 12/3 ([place])  - HTN--stable  - HLD--statin  - Preop sinus bradycardia  - Tobacco abuse--35 pack year hx, PFTs noted, cessation d/w pt  - MRSA nasal colonization--mupirocin/ vanc   - post op night time respiratory insufficiency, hypoxia    Presenting Problem/History of Present Illness  Angina at rest [I20.89]  Abnormal nuclear stress test [R94.39]  CAD (coronary artery disease) [I25.10]     Hospital Course  John Paul Tineo is a 58 y.o. male with CAD.  He presented to [facility] on 12/1/2023 for an elective heart cath following an abnormal stress test in late November.  He was noted to have multivessel CAD and on 12/3/2023, he underwent CABG x 3 (LIMA to LAD, SVG to OM, SVG to RCA), EVH of the right leg with Dr. Salmon (See op note for full report).  Postoperatively, he did well. He was extubated on day of surgery and weaned from pressors.  Chest tubes, Dodson, central line, and epicardial wires were removed without issue in the usual fashion. He was noted to have nocturnal hypoxia throughout his post op course, an overnight oximetry test was performed and showed he needed 2 L of O2 nasal cannula to keep his oxygen saturation over 90%.  A walking oximetry test was passed on room air.  An order was placed for outpatient O2 at nighttime only.  He is tolerating the current medication regimen.  He was evaluated by physical therapy and recommended home with family assist at discharge.  At the time of discharge, the patient was tolerating oral intake, voiding on own, and his bowel function has returned.  He was deemed ready for discharge on POD#5.  Sternal precautions reviewed, as were signs and symptoms of sternal wound infection.  Follow-up in the office in 2 weeks.      Procedures Performed  Procedure(s):  CORONARY ARTERY BYPASS  GRAFTING       Consults:   Consults       Date and Time Order Name Status Description    12/3/2023 10:21 AM Inpatient Cardiology Consult      12/1/2023  9:54 AM Inpatient Cardiothoracic Surgery Consult Completed             Pertinent Test Results:    Lab Results   Component Value Date    WBC 9.60 12/08/2023    HGB 11.9 (L) 12/08/2023    HCT 35.3 (L) 12/08/2023    MCV 99.3 (H) 12/08/2023     12/08/2023      Lab Results   Component Value Date    GLUCOSE 116 (H) 12/08/2023    CALCIUM 9.4 12/08/2023     12/08/2023    K 3.8 12/08/2023    CO2 29.0 12/08/2023    CL 95 (L) 12/08/2023    BUN 16 12/08/2023    CREATININE 0.73 (L) 12/08/2023    EGFRIFAFRI >60 08/24/2017    BCR 21.9 12/08/2023    ANIONGAP 13.0 12/08/2023     Lab Results   Component Value Date    INR 1.06 12/04/2023    PROTIME 11.5 12/04/2023         Condition on Discharge:    Stable    Vital Signs  Temp:  [97.5 °F (36.4 °C)-98.5 °F (36.9 °C)] 98.2 °F (36.8 °C)  Heart Rate:  [] 94  Resp:  [22-28] 22  BP: ()/(46-74) 106/65      Discharge Disposition  Home or Self Care      Discharge Medications     Discharge Medications        New Medications        Instructions Start Date   furosemide 20 MG tablet  Commonly known as: LASIX   20 mg, Oral, Daily   Start Date: December 9, 2023     HYDROcodone-acetaminophen 5-325 MG per tablet  Commonly known as: NORCO   1 tablet, Oral, Every 4 Hours PRN      metoprolol tartrate 25 MG tablet  Commonly known as: LOPRESSOR   25 mg, Oral, Every 12 Hours Scheduled      potassium chloride 10 MEQ CR tablet   10 mEq, Oral, Daily   Start Date: December 9, 2023            Changes to Medications        Instructions Start Date   rosuvastatin 5 MG tablet  Commonly known as: CRESTOR  What changed: how much to take   20 mg, Oral, Daily             Continue These Medications        Instructions Start Date   aspirin 81 MG EC tablet   81 mg, Oral, Daily             Stop These Medications      metoprolol succinate XL 25 MG 24  hr tablet  Commonly known as: TOPROL-XL     nitroglycerin 0.4 MG SL tablet  Commonly known as: NITROSTAT              Discharge Diet: Cardiac diet    Activity at Discharge:   1. No driving for 2 weeks and off narcotic pain medications.  2. Shower daily. Clean incisions with warm water and antibacterial soap only. Do not put any lotion or ointments on incisions.  3. Ambulate for 10 minutes at least 3 times a day.  4. No heavy lifting > 10lbs until seen in office.   5. Take all medications as prescribed.       Follow-up Appointments  Future Appointments   Date Time Provider Department Center   12/28/2023  1:15 PM Tammy Diop APRN [facility]   5/15/2024  3:00 PM Fred Lopez MD [facility]     Additional Instructions for the Follow-ups that You Need to Schedule       Call MD With Problems / Concerns   As directed      Instructions:  Call office at [phone] for any drainage, increased redness, or fever over 100.5    Order Comments: Instructions:  Call office at [phone] for any drainage, increased redness, or fever over 100.5         Discharge Follow-up with PCP   As directed       Currently Documented PCP:    Morris Mercado PA-C    PCP Phone Number:    [phone]     Follow Up Details: in 1 week        Discharge Follow-up with Specialty: Cardiologist PEDRO/PA; 1 Week   As directed      Specialty: Cardiologist PEDRO/PA   Follow Up: 1 Week   Follow Up Details: bring all prescription bottles to appointment, call for appointment        Discharge Follow-up with Specified Provider: Cardiologist; 1 Month   As directed      To: Cardiologist   Follow Up: 1 Month   Follow Up Details: call for appointment, bring all medication bottles to appointment        Discharge Follow-up with Specified Provider: Dr. Salmon's nurse practitioner  12/28/23 @ 1:15 pm   As directed      To: Dr. Salmon's nurse practitioner  12/28/23 @ 1:15 pm   Follow Up Details: bring all current  medications to appointment                 PEDRO Chávez  12/08/23  10:03 EST                Electronically signed by Bernice Delong APRN at 12/08/23 8856

## 2023-12-08 NOTE — DISCHARGE PLACEMENT REQUEST
"John Paul Tineo (58 y.o. Male)       Date of Birth   1965    Social Security Number       Address   [address] [place] [address] [place] [address]    Home Phone   [phone]    MRN   [id number]       Presybeterian   None    Marital Status                               Admission Date   23    Admission Type   Elective    Admitting Provider   Fred Lopez MD    Attending Provider   Jeffery Salmon MD    Department, Room/Bed   [facility] CARDIOVASCULAR CARE UNIT,        Discharge Date       Discharge Disposition   Home or Self Care    Discharge Destination                                 Attending Provider: Jeffery Salmon MD    Allergies: No Known Allergies    Isolation: Contact   Infection: MRSA (23)   Code Status: CPR    Ht: 170.2 cm (67\")   Wt: 90.4 kg (199 lb 3.2 oz)    Admission Cmt: None   Principal Problem: CAD (coronary artery disease) [I25.10]                   Active Insurance as of 2023       Primary Coverage       Payor Plan Insurance Group Employer/Plan Group    ANTH [employer] [facility] PPO [id number]       Payor Plan Address Payor Plan Phone Number Payor Plan Fax Number Effective Dates    [address]   10/1/2023 - None Entered    [place] [address]         Subscriber Name Subscriber Birth Date Member ID       JOHN PAUL TINEO 1965 [id number]                     Emergency Contacts        (Rel.) Home Phone Work Phone Mobile Phone    LUPILLO TINEO (Mother) -- -- [phone]    GUSTAVO TINEO (Daughter) -- -- [phone]            [facility] CARDIOVASCULAR [facility]ARE UNIT  [facility] [place] [address]  Dept. Phone:  [phone]  Dept. Fax:  [phone] Date Ordered: Dec 8, 2023         Patient:  John Paul Tineo MRN:  [id number]   [address] [facility] [place] [address] :  1965  SSN:    Phone: [phone] Sex:  M     Weight: 90.4 kg (199 lb 3.2 oz)         Ht Readings " "from Last 1 Encounters:   12/01/23 170.2 cm (67\")         Home Oxygen Therapy          (Order ID: [id number])    Diagnosis:  Acute postoperative respiratory insufficiency (J95.89 [ICD-10-CM] 518.52 [ICD-9-CM])  S/P CABG x 3 (Z95.1 [ICD-10-CM] V45.81 [ICD-9-CM])  Tobacco abuse (Z72.0 [ICD-10-CM] 305.1 [ICD-9-CM])   Quantity:  1     Delivery Modality: Nasal Cannula  Liters Per Minute: 2  Duration: During Sleep  Equipment:  Oxygen Concentrator &  &  Portable Gaseous Oxygen System & Portable Oxygen Contents Gaseous &  Conserving Regulator  Length of Need: 99 Months = Lifetime        Authorizing Provider's Phone: [phone]  Authorizing Provider:Bernice Delong APRN  Authorizing Provider's NPI: [id number]  Order Entered By: Bernice Delong APRN 12/8/2023 10:02 AM     Electronically signed by: Bernice Delong APRN 12/8/2023 10:02 AM       Bernice Delong APRN   Nurse Practitioner  Cardiothoracic Surgery  Discharge Summary      Cosign Needed  Date of Service:  12/08/23 1003  Creation Time:  12/08/23 1003     Cosign Needed        [facility] Cardiac Surgery Discharge Summary     Date of Admission: 12/1/2023  5:59 AM   Date of Discharge:  12/8/2023     Discharge Diagnosis:   - MV CAD including left main disease, EF 60% (stress test)--s/p CABG x 3 (LIMA to LAD, SVG to OM, SVG to RCA), EVH of the right leg on 12/3 ([place])  - HTN--stable  - HLD--statin  - Preop sinus bradycardia  - Tobacco abuse--35 pack year hx, PFTs noted, cessation d/w pt  - MRSA nasal colonization--mupirocin/ vanc      Presenting Problem/History of Present Illness  Angina at rest [I20.89]  Abnormal nuclear stress test [R94.39]  CAD (coronary artery disease) [I25.10]               Hospital Course  John Paul Tineo is a 58 y.o. male with CAD.  He presented to [facility] on 12/1/2023 for an elective heart cath following an abnormal stress test in late November.  He was noted to have multivessel CAD " and on 12/3/2023, he underwent CABG x 3 (LIMA to LAD, SVG to OM, SVG to RCA), EVH of the right leg with Dr. Salmon (See op note for full report).  Postoperatively, he did well. He was extubated on day of surgery and weaned from pressors.  Chest tubes, Dodson, central line, and epicardial wires were removed without issue in the usual fashion. He was noted to have nocturnal hypoxia throughout his post op course, an overnight oximetry test was performed and showed he needed 2 L of O2 nasal cannula to keep his oxygen saturation over 90%.  A walking oximetry test was passed on room air.  An order was placed for outpatient O2 at nighttime only.  He is tolerating the current medication regimen.  He was evaluated by physical therapy and recommended home with family assist at discharge.  At the time of discharge, the patient was tolerating oral intake, voiding on own, and his bowel function has returned.  He was deemed ready for discharge on POD#5.  Sternal precautions reviewed, as were signs and symptoms of sternal wound infection.  Follow-up in the office in 2 weeks.        Procedures Performed  Procedure(s):  CORONARY ARTERY BYPASS GRAFTING     Consults:   Consults         Date and Time Order Name Status Description     12/3/2023 10:21 AM Inpatient Cardiology Consult         12/1/2023  9:54 AM Inpatient Cardiothoracic Surgery Consult Completed                  Pertinent Test Results:           Lab Results   Component Value Date     WBC 9.60 12/08/2023     HGB 11.9 (L) 12/08/2023     HCT 35.3 (L) 12/08/2023     MCV 99.3 (H) 12/08/2023      12/08/2023            Lab Results   Component Value Date     GLUCOSE 116 (H) 12/08/2023     CALCIUM 9.4 12/08/2023      12/08/2023     K 3.8 12/08/2023     CO2 29.0 12/08/2023     CL 95 (L) 12/08/2023     BUN 16 12/08/2023     CREATININE 0.73 (L) 12/08/2023     EGFRIFAFRI >60 08/24/2017     BCR 21.9 12/08/2023     ANIONGAP 13.0 12/08/2023            Lab Results    Component Value Date     INR 1.06 12/04/2023     PROTIME 11.5 12/04/2023            Condition on Discharge:    Stable     Vital Signs  Temp:  [97.5 °F (36.4 °C)-98.5 °F (36.9 °C)] 98.2 °F (36.8 °C)  Heart Rate:  [] 94  Resp:  [22-28] 22  BP: ()/(46-74) 106/65        Discharge Disposition  Home or Self Care        Discharge Medications      Discharge Medications          New Medications         Instructions Start Date   furosemide 20 MG tablet  Commonly known as: LASIX    20 mg, Oral, Daily    Start Date: December 9, 2023      HYDROcodone-acetaminophen 5-325 MG per tablet  Commonly known as: NORCO    1 tablet, Oral, Every 4 Hours PRN        metoprolol tartrate 25 MG tablet  Commonly known as: LOPRESSOR    25 mg, Oral, Every 12 Hours Scheduled        potassium chloride 10 MEQ CR tablet    10 mEq, Oral, Daily    Start Date: December 9, 2023                Changes to Medications         Instructions Start Date   rosuvastatin 5 MG tablet  Commonly known as: CRESTOR  What changed: how much to take    20 mg, Oral, Daily                  Continue These Medications         Instructions Start Date   aspirin 81 MG EC tablet    81 mg, Oral, Daily                  Stop These Medications       metoprolol succinate XL 25 MG 24 hr tablet  Commonly known as: TOPROL-XL      nitroglycerin 0.4 MG SL tablet  Commonly known as: NITROSTAT                   Discharge Diet: Cardiac diet     Activity at Discharge:   1. No driving for 2 weeks and off narcotic pain medications.  2. Shower daily. Clean incisions with warm water and antibacterial soap only. Do not put any lotion or ointments on incisions.  3. Ambulate for 10 minutes at least 3 times a day.  4. No heavy lifting > 10lbs until seen in office.   5. Take all medications as prescribed.         Follow-up Appointments         Future Appointments   Date Time Provider Department Center   12/28/2023  1:15 PM Tammy Diop APRN [facility]   5/15/2024  3:00  PM Fred Lopez MD [facility]      Additional Instructions for the Follow-ups that You Need to Schedule         Call MD With Problems / Concerns   As directed        Instructions:  Call office at [phone] for any drainage, increased redness, or fever over 100.5     Order Comments: Instructions:  Call office at [phone] for any drainage, increased redness, or fever over 100.5            Discharge Follow-up with PCP   As directed         Currently Documented PCP:    Morris Mercado PA-C    PCP Phone Number:    [phone]      Follow Up Details: in 1 week           Discharge Follow-up with Specialty: Cardiologist APRN/PA; 1 Week   As directed        Specialty: Cardiologist APRN/PA   Follow Up: 1 Week   Follow Up Details: bring all prescription bottles to appointment, call for appointment           Discharge Follow-up with Specified Provider: Cardiologist; 1 Month   As directed        To: Cardiologist   Follow Up: 1 Month   Follow Up Details: call for appointment, bring all medication bottles to appointment           Discharge Follow-up with Specified Provider: Dr. Salmon's nurse practitioner  12/28/23 @ 1:15 pm   As directed        To: Dr. Salmon's nurse practitioner  12/28/23 @ 1:15 pm   Follow Up Details: bring all current medications to appointment                      PEDRO Chávez  12/08/23  10:03 EST

## 2023-12-08 NOTE — CASE MANAGEMENT/SOCIAL WORK
Continued Stay Note  BHANU Carmelo     Patient Name: John Paul Tineo  MRN: [id number]  Today's Date: 12/8/2023    Admit Date: 12/1/2023    Plan: DC Plan: Home with mother assistance. [facility] to provide nocturanl oxygen. Order placed.   Discharge Plan       Row Name 12/08/23 1034       Plan    Plan DC Plan: Home with mother assistance. [facility] to provide nocturanl oxygen. Order placed.    Patient/Family in Agreement with Plan yes    Provided Post Acute Provider List? N/A    Provided Post Acute Provider Quality & Resource List? N/A    Plan Comments CM spoke with patient’s nurse and [facility] NP Bernice Delong to obtain clinical updates. Plan for patient to discharge home today. Patient failed overnight oximetry. Order in place for home oxygen. CM spoke with patient at bedside to discuss DME options. Patient has selected [facility] for services. CM placed referral in basket and liaison notified. Naila confirmed acceptance. Patient to transport home via private vehicle with mother. No barriers to DC.               Expected Discharge Date and Time       Expected Discharge Date Expected Discharge Time    Dec 8, 2023               Michaela Angelo RN    Office Phone: [phone]  Office Cell:     [phone]

## 2023-12-08 NOTE — PROGRESS NOTES
CARDIOLOGY PROGRESS NOTE:    John Paul Tineo  58 y.o.  male  1965  [id number]      Referring Provider: Cardiac surgeon    Reason for follow-up: Coronary artery disease     Patient Care Team:  Morris Mercado PA-C as PCP - General (Physician Assistant)  Fred Lopez MD as Consulting Physician (Cardiology)    Subjective .  No chest pain or shortness of breath.  Ambulating well    Objective i sitting in chair comfortably     Review of Systems   Constitutional: Negative for fever and malaise/fatigue.   HENT:  Negative for ear pain and nosebleeds.    Eyes:  Negative for blurred vision and double vision.   Cardiovascular:  Negative for chest pain, dyspnea on exertion and palpitations.   Respiratory:  Negative for cough and shortness of breath.    Skin:  Negative for rash.   Musculoskeletal:  Negative for joint pain.   Gastrointestinal:  Negative for abdominal pain, nausea and vomiting.   Neurological:  Negative for focal weakness and headaches.   Psychiatric/Behavioral:  Negative for depression. The patient is not nervous/anxious.    All other systems reviewed and are negative.      Allergies: Patient has no known allergies.    Scheduled Meds:aspirin, 81 mg, Oral, Daily  atorvastatin, 40 mg, Oral, Nightly  chlorhexidine, 15 mL, Mouth/Throat, Q12H  enoxaparin, 40 mg, Subcutaneous, Daily  folic acid, 1 mg, Oral, Daily  furosemide, 20 mg, Oral, Daily  insulin lispro, 2-9 Units, Subcutaneous, 4x Daily AC & at Bedtime  metoprolol tartrate, 25 mg, Oral, Q12H  multivitamin with minerals, 1 tablet, Oral, Daily  pantoprazole, 40 mg, Oral, Daily  polyethylene glycol, 17 g, Oral, BID  potassium chloride ER, 20 mEq, Oral, Once  potassium chloride, 10 mEq, Oral, Daily  senna-docusate sodium, 2 tablet, Oral, BID  thiamine, 100 mg, Oral, Daily      Continuous Infusions:     PRN Meds:.  acetaminophen **OR** acetaminophen **OR** acetaminophen    Calcium Replacement - Follow Nurse / BPA Driven Protocol    cyclobenzaprine     "dextrose    dextrose    glucagon (human recombinant)    HYDROcodone-acetaminophen    LORazepam **OR** LORazepam **OR** LORazepam **OR** LORazepam **OR** LORazepam **OR** LORazepam    Magnesium Cardiology Dose Replacement - Follow Nurse / BPA Driven Protocol    [] Morphine **AND** naloxone    nitroglycerin    ondansetron    Phosphorus Replacement - Follow Nurse / BPA Driven Protocol    Potassium Replacement - Follow Nurse / BPA Driven Protocol        VITAL SIGNS  Vitals:    23 0600 23 0700 23 0800 23 1129   BP: 93/54 109/71 106/65 117/74   BP Location:   Right arm Left arm   Patient Position:   Sitting Sitting   Pulse: 89 84 94 93   Resp:   22 21   Temp:   98.2 °F (36.8 °C) 97.9 °F (36.6 °C)   TempSrc:   Oral Oral   SpO2: 91% 91% 91%    Weight:       Height:           Flowsheet Rows      Flowsheet Row First Filed Value   Admission Height 170.2 cm (67\") Documented at 202315   Admission Weight 90.9 kg (200 lb 6.4 oz) Documented at 2023 0715             TELEMETRY: Sinus rhythm    Physical Exam:  Constitutional:       Appearance: Well-developed.   Eyes:      General: No scleral icterus.     Conjunctiva/sclera: Conjunctivae normal.      Pupils: Pupils are equal, round, and reactive to light.   HENT:      Head: Normocephalic and atraumatic.   Neck:      Vascular: No carotid bruit or JVD.   Pulmonary:      Effort: Pulmonary effort is normal.      Breath sounds: Normal breath sounds. No wheezing. No rales.   Cardiovascular:      Normal rate. Regular rhythm.   Pulses:     Intact distal pulses.   Abdominal:      General: Bowel sounds are normal.      Palpations: Abdomen is soft.   Musculoskeletal: Normal range of motion.      Cervical back: Normal range of motion and neck supple. Skin:     General: Skin is warm and dry.      Findings: No rash.   Neurological:      Mental Status: Alert.      Comments: No focal deficits          Results Review:   I reviewed the patient's new clinical " results.  Lab Results (last 24 hours)       Procedure Component Value Units Date/Time    POC Glucose Once [[id number]]  (Normal) Collected: 12/08/23 1119    Specimen: Blood Updated: 12/08/23 1123     Glucose 84 mg/dL      Comment: Serial Number: [id number]:  [id number]       POC Glucose Once [[id number]]  (Abnormal) Collected: 12/08/23 0800    Specimen: Blood Updated: 12/08/23 0803     Glucose 125 mg/dL      Comment: Serial Number: [id number]:  [id number]       Basic Metabolic Panel [[id number]]  (Abnormal) Collected: 12/08/23 0653    Specimen: Blood Updated: 12/08/23 0800     Glucose 116 mg/dL      BUN 16 mg/dL      Creatinine 0.73 mg/dL      Sodium 137 mmol/L      Potassium 3.8 mmol/L      Chloride 95 mmol/L      CO2 29.0 mmol/L      Calcium 9.4 mg/dL      BUN/Creatinine Ratio 21.9     Anion Gap 13.0 mmol/L      eGFR 106.1 mL/min/1.73     Narrative:      GFR Normal >60  Chronic Kidney Disease <60  Kidney Failure <15      CBC (No Diff) [[id number]]  (Abnormal) Collected: 12/08/23 0653    Specimen: Blood Updated: 12/08/23 0726     WBC 9.60 10*3/mm3      RBC 3.55 10*6/mm3      Hemoglobin 11.9 g/dL      Hematocrit 35.3 %      MCV 99.3 fL      MCH 33.5 pg      MCHC 33.7 g/dL      RDW 12.4 %      RDW-SD 42.9 fl      MPV 8.9 fL      Platelets 366 10*3/mm3     POC Glucose Once [[id number]]  (Abnormal) Collected: 12/07/23 2018    Specimen: Blood Updated: 12/07/23 2019     Glucose 129 mg/dL      Comment: Serial Number: [id number]:  [id number]       POC Glucose Once [[id number]]  (Normal) Collected: 12/07/23 1606    Specimen: Blood Updated: 12/07/23 1608     Glucose 82 mg/dL      Comment: Serial Number: [id number]:  [id number]               Imaging Results (Last 24 Hours)       ** No results found for the last 24 hours. **            EKG      I personally viewed and interpreted the patient's EKG/Telemetry data:    ECHOCARDIOGRAM:  Results for orders placed during the hospital encounter of 12/01/23    Adult  Transthoracic Echo Complete w/ Color, Spectral and Contrast if Necessary Per Protocol    Interpretation Summary    Left ventricular ejection fraction appears to be 61 - 65%.    Saline test results are negative.       STRESS MYOVIEW:  Results for orders placed during the hospital encounter of 11/28/23    Stress Test With Myocardial Perfusion One Day    Interpretation Summary    Left ventricular ejection fraction is normal (Calculated EF = 60%).    High risk for ischemic heart disease.    Myocardial perfusion imaging indicates a large-sized, severe area of ischemia located in the anterior wall, apex and septal wall.    Impressions are consistent with a high risk study.       CARDIAC CATHETERIZATION:  Results for orders placed during the hospital encounter of 12/01/23    Cardiac Catheterization/Vascular Study       OTHER:         Assessment & Plan     Angina with abnormal Myoview  Coronary artery disease  Patient had chest pain with risk factors for coronary disease and had an abnormal Myoview and hence he underwent cardiac catheterization  Patient had significant left main and three-vessel coronary disease and is scheduled for coronary bypass surgery  Cardiac surgeons have seen the patient is having workup done for pre-CABG.  Patient had an echocardiogram which showed normal LV systolic function  Patient is currently stable on medical therapy with aspirin statins and beta-blockers  Patient had a positive MRSA screen.  Patient had a vein mapping which is adequate on carotid duplex which is normal and his COVID screen is negative.  He also has PFTs which were within normal limits.  Patient underwent coronary artery bypass surgery x 3 vessels with a LIMA to LAD and SVG to the marginal branch and SVG to the RCA without any complications  Patient is extubated and sitting in chair  Patient's chest tubes are removed.  Patient's Dodson catheter has been removed  Patient is ambulating very well and tolerating  medications  Patient is being discharged to home today and will follow in the office.    Hypertension  Patient is currently on beta-blockers and tolerating very well  Patient's metoprolol dose has been adjusted for his heart rate and blood pressure and is doing well okay to go home with    Hyperlipidemia  Patient is on Crestor and his lipid levels are followed by the primary care doctor    Home soon.    I discussed the patients findings and my recommendations with patient and nurse    Fred Lopez MD  12/08/23  11:50 EST

## 2023-12-08 NOTE — CASE MANAGEMENT/SOCIAL WORK
Continued Stay Note  BHANU Rhodes     Patient Name: John Paul Tineo  MRN: [id number]  Today's Date: 12/8/2023    Admit Date: 12/1/2023    Plan: [place] Plan: Home with mother assistance. [employer] to provide nocturanl oxygen. Order placed.   Discharge Plan       Row Name 12/08/23 1346       Plan    Plan Comments Update: [facility] states insurance does not cover for anyone except [facility] and they cannot fulfill order. CM placed referral in basket for [facility] and all appropriate documentation sent. CM noitfied liaison Tammy of referral. Awaiting confirmation of acceptance. Patient to discharge soon. No barriers.               Expected Discharge Date and Time       Expected Discharge Date Expected Discharge Time    Dec 8, 2023               Michaela Angelo RN     Office Phone: [phone]  Office Cell:     [phone]

## 2023-12-08 NOTE — DISCHARGE PLACEMENT REQUEST
"John Paul Vital (58 y.o. Male)       Date of Birth   1965    Social Security Number       Address   [address] [place] [address] [place] [address]    Home Phone   [phone]    MRN   [id number]       Adventism   None    Marital Status                               Admission Date   12/1/23    Admission Type   Elective    Admitting Provider   Fred Lopez MD    Attending Provider   Jeffery Salmon MD    Department, Room/Bed   [facility] CARDIOVASCULAR CARE UNIT, 2210/1       Discharge Date       Discharge Disposition   Home or Self Care    Discharge Destination                                 Attending Provider: Jeffery Salmon MD    Allergies: No Known Allergies    Isolation: Contact   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)   Code Status: CPR    Ht: 170.2 cm (67\")   Wt: 90.4 kg (199 lb 3.2 oz)    Admission Cmt: None   Principal Problem: CAD (coronary artery disease) [I25.10]                   Active Insurance as of 12/1/2023       Primary Coverage       Payor Plan Insurance Group Employer/Plan Group    [employer] PPO [id number]       Payor Plan Address Payor Plan Phone Number Payor Plan Fax Number Effective Dates    [address]   10/1/2023 - None Entered    [place] [address]         Subscriber Name Subscriber Birth Date Member ID       JOHN PAUL VITAL 1965 [id number]                     Emergency Contacts        (Rel.) Home Phone Work Phone Mobile Phone    LUPILLO VITAL (Mother) -- -- [phone]    GUSTAVO VITAL (Daughter) -- -- [phone]                 History & Physical        Fred Lopez MD at 12/01/23 0816            H&P updated. The patient was examined and the following changes are noted:            Electronically signed by Fred Lopez MD at 12/01/23 0817   Source Note        Post-procedure Diagnoses    1. Angina pectoris [I20.9]                     Subjective:   Encounter Date:11/15/2023      Patient ID: John Paul SANTOS " "Noman is a 58 y.o. male.    Chief Complaint:  History of Present Illness 58-year-old white male with history of tobacco usage hypertension hyperlipidemia presents to my office for new consultation.  Patient has been having symptoms of chest pain which he describes as a substernal discomfort without radiation but also also with shortness of breath.  No complains any PND orthopnea.  No palpitation dizziness syncope or swelling of the feet.  Patient has been taking his medicines which were recently started.  Patient also continues to smoke.  He had a CT scan of the chest as a routine part of lung cancer screening and was noted to have coronary artery calcifications.  Patient also has strong family history for coronary disease with early and premature coronary disease.  /85 Comment: recheck  Pulse 67   Ht 170.2 cm (67\")   Wt 91.6 kg (202 lb)   SpO2 98%   BMI 31.64 kg/m²     The following portions of the patient's history were reviewed and updated as appropriate: allergies, current medications, past family history, past medical history, past social history, past surgical history, and problem list.  Past Medical History:   Diagnosis Date    Hyperlipidemia      Past Surgical History:   Procedure Laterality Date    COLONOSCOPY      KNEE ARTHROSCOPY Left 8/28/2020    Procedure: KNEE ARTHROSCOPY with partial and lateral  MENISCECTOMY AND CHONROPLASTY;  Surgeon: Yovany Jacobs II, MD;  Location: [facility];  Service: Orthopedics;  Laterality: Left;     Social History     Socioeconomic History    Marital status:    Tobacco Use    Smoking status: Every Day     Packs/day: .5     Types: Cigarettes     Passive exposure: Current    Smokeless tobacco: Never   Vaping Use    Vaping Use: Never used   Substance and Sexual Activity    Alcohol use: Yes     Alcohol/week: 24.0 standard drinks of alcohol     Types: 24 Cans of beer per week    Drug use: Never    Sexual activity: Defer     Family History "   Problem Relation Age of Onset    Hyperlipidemia Mother     Hyperlipidemia Father     Heart attack Father      Current Outpatient Medications:     rosuvastatin (CRESTOR) 5 MG tablet, Take 1 tablet by mouth Daily., Disp: , Rfl:     HYDROcodone-acetaminophen (NORCO) 5-325 MG per tablet, Take 1 tablet by mouth Every 4 (Four) Hours As Needed for Severe Pain ., Disp: 30 tablet, Rfl: 0  No Known Allergies    Review of Systems   Constitutional: Negative for fever and malaise/fatigue.   HENT:  Negative for ear pain and nosebleeds.    Eyes:  Negative for blurred vision and double vision.   Cardiovascular:  Positive for chest pain. Negative for dyspnea on exertion, leg swelling and palpitations.   Respiratory:  Positive for shortness of breath. Negative for cough.    Skin:  Negative for rash.   Musculoskeletal:  Negative for joint pain.   Gastrointestinal:  Negative for abdominal pain, nausea and vomiting.   Neurological:  Negative for dizziness, focal weakness, headaches, light-headedness and numbness.   Psychiatric/Behavioral:  Negative for depression. The patient is not nervous/anxious.    All other systems reviewed and are negative.             Objective:     Constitutional:       Appearance: Well-developed.   Eyes:      General: No scleral icterus.     Conjunctiva/sclera: Conjunctivae normal.      Pupils: Pupils are equal, round, and reactive to light.   HENT:      Head: Normocephalic and atraumatic.   Neck:      Vascular: No carotid bruit or JVD.   Pulmonary:      Effort: Pulmonary effort is normal.      Breath sounds: Normal breath sounds. No wheezing. No rales.   Cardiovascular:      Normal rate. Regular rhythm.   Pulses:     Intact distal pulses.   Abdominal:      General: Bowel sounds are normal.      Palpations: Abdomen is soft.   Musculoskeletal: Normal range of motion.      Cervical back: Normal range of motion and neck supple. Skin:     General: Skin is warm and dry.      Findings: No rash.   Neurological:       Mental Status: Alert.      Comments: No focal deficits           ECG 12 Lead    Date/Time: 11/15/2023 1:23 PM  Performed by: Fred Lopez MD    Authorized by: Fred Lopez MD  Comments: Sinus rhythm  Poor R wave progression anterior leads, cannot rule out anterior tract  Abnormal EKG  No previous is available          Lab Review:       Assessment:          Diagnosis Plan   1. Angina pectoris        2. Shortness of breath        3. Primary hypertension        4. Pure hypercholesterolemia        5. Abnormal CT of the chest             Plan:    Patient present with chest pain or shortness of breath and has risk factor for coronary disease  Patient also had a CT scan of the chest which showed significant coronary artery calcifications  Patient will have a stress Myoview study to rule out ischemia  Patient blood pressure is high and needs to be started on blood pressure medicines after he checks his blood pressure at home  Patient is already on statins for his hyperlipidemia.  Further treatment based on stress test result             Electronically signed by Fred Lopez MD at 11/15/23 1326                 Fred Lopez MD at 11/15/23 1300        Post-procedure Diagnoses    1. Angina pectoris [I20.9]                     Subjective:     Encounter Date:11/15/2023      Patient ID: John Paul Tineo is a 58 y.o. male.    Chief Complaint:  History of Present Illness 58-year-old white male with history of tobacco usage hypertension hyperlipidemia presents to my office for new consultation.  Patient has been having symptoms of chest pain which he describes as a substernal discomfort without radiation but also also with shortness of breath.  No complains any PND orthopnea.  No palpitation dizziness syncope or swelling of the feet.  Patient has been taking his medicines which were recently started.  Patient also continues to smoke.  He had a CT scan of the chest as a routine part of lung cancer screening and was noted to  "have coronary artery calcifications.  Patient also has strong family history for coronary disease with early and premature coronary disease.  /85 Comment: recheck  Pulse 67   Ht 170.2 cm (67\")   Wt 91.6 kg (202 lb)   SpO2 98%   BMI 31.64 kg/m²     The following portions of the patient's history were reviewed and updated as appropriate: allergies, current medications, past family history, past medical history, past social history, past surgical history, and problem list.  Past Medical History:   Diagnosis Date    Hyperlipidemia      Past Surgical History:   Procedure Laterality Date    COLONOSCOPY      KNEE ARTHROSCOPY Left 8/28/2020    Procedure: KNEE ARTHROSCOPY with partial and lateral  MENISCECTOMY AND CHONROPLASTY;  Surgeon: Yovany Jacobs II, MD;  Location: [facility];  Service: Orthopedics;  Laterality: Left;     Social History     Socioeconomic History    Marital status:    Tobacco Use    Smoking status: Every Day     Packs/day: .5     Types: Cigarettes     Passive exposure: Current    Smokeless tobacco: Never   Vaping Use    Vaping Use: Never used   Substance and Sexual Activity    Alcohol use: Yes     Alcohol/week: 24.0 standard drinks of alcohol     Types: 24 Cans of beer per week    Drug use: Never    Sexual activity: Defer     Family History   Problem Relation Age of Onset    Hyperlipidemia Mother     Hyperlipidemia Father     Heart attack Father        Current Outpatient Medications:     rosuvastatin (CRESTOR) 5 MG tablet, Take 1 tablet by mouth Daily., Disp: , Rfl:     HYDROcodone-acetaminophen (NORCO) 5-325 MG per tablet, Take 1 tablet by mouth Every 4 (Four) Hours As Needed for Severe Pain ., Disp: 30 tablet, Rfl: 0  No Known Allergies    Review of Systems   Constitutional: Negative for fever and malaise/fatigue.   HENT:  Negative for ear pain and nosebleeds.    Eyes:  Negative for blurred vision and double vision.   Cardiovascular:  Positive for chest pain. Negative " for dyspnea on exertion, leg swelling and palpitations.   Respiratory:  Positive for shortness of breath. Negative for cough.    Skin:  Negative for rash.   Musculoskeletal:  Negative for joint pain.   Gastrointestinal:  Negative for abdominal pain, nausea and vomiting.   Neurological:  Negative for dizziness, focal weakness, headaches, light-headedness and numbness.   Psychiatric/Behavioral:  Negative for depression. The patient is not nervous/anxious.    All other systems reviewed and are negative.             Objective:     Constitutional:       Appearance: Well-developed.   Eyes:      General: No scleral icterus.     Conjunctiva/sclera: Conjunctivae normal.      Pupils: Pupils are equal, round, and reactive to light.   HENT:      Head: Normocephalic and atraumatic.   Neck:      Vascular: No carotid bruit or JVD.   Pulmonary:      Effort: Pulmonary effort is normal.      Breath sounds: Normal breath sounds. No wheezing. No rales.   Cardiovascular:      Normal rate. Regular rhythm.   Pulses:     Intact distal pulses.   Abdominal:      General: Bowel sounds are normal.      Palpations: Abdomen is soft.   Musculoskeletal: Normal range of motion.      Cervical back: Normal range of motion and neck supple. Skin:     General: Skin is warm and dry.      Findings: No rash.   Neurological:      Mental Status: Alert.      Comments: No focal deficits           ECG 12 Lead    Date/Time: 11/15/2023 1:23 PM  Performed by: Fred Lopez MD    Authorized by: Fred Lopez MD  Comments: Sinus rhythm  Poor R wave progression anterior leads, cannot rule out anterior tract  Abnormal EKG  No previous is available          Lab Review:       Assessment:          Diagnosis Plan   1. Angina pectoris        2. Shortness of breath        3. Primary hypertension        4. Pure hypercholesterolemia        5. Abnormal CT of the chest               Plan:    Patient present with chest pain or shortness of breath and has risk factor for  coronary disease  Patient also had a CT scan of the chest which showed significant coronary artery calcifications  Patient will have a stress Myoview study to rule out ischemia  Patient blood pressure is high and needs to be started on blood pressure medicines after he checks his blood pressure at home  Patient is already on statins for his hyperlipidemia.  Further treatment based on stress test result             Electronically signed by Fred Lopez MD at 11/15/23 7786

## 2023-12-09 NOTE — OUTREACH NOTE
Prep Survey      Flowsheet Row Responses   [facility] facility patient discharged from? [facility]   Is LACE score < 7 ? No   Eligibility Readm Mgmt   Discharge diagnosis CORONARY ARTERY BYPASS GRAFTING   Does the patient have one of the following disease processes/diagnoses(primary or secondary)? Cardiothoracic surgery   Does the patient have Home health ordered? No   Is there a DME ordered? Yes   What DME was ordered? [facility] to provide nocturanl oxygen.   Prep survey completed? Yes            Erika LIM - Registered Nurse

## 2023-12-11 ENCOUNTER — TELEPHONE (OUTPATIENT)
Dept: CARDIOLOGY | Facility: CLINIC | Age: 58
End: 2023-12-11
Payer: COMMERCIAL

## 2023-12-11 NOTE — TELEPHONE ENCOUNTER
Caller: John Paul Tineo    Relationship to patient: Self    Best call back number:[phone]    Patient is needing: PT NEEDING A 2 WEEK HOSPITAL FOLLOW UP SCHEDULED PER DISCHARGE SUMMARY, HOWEVER DR. MARCANO AND [facility] ARE BOOKED UP. PLEASE FOLLOW UP AND ADVISE.

## 2023-12-11 NOTE — CASE MANAGEMENT/SOCIAL WORK
Case Management Discharge Note       Durable Medical Equipment       Service Provider Selected Services Address Phone Fax Patient Preferred    [facility] [id number] [place] [id number] [phone] --                 Transportation Services  Private: Car (with mother)    Final Discharge Disposition Code: 01 - home or self-care

## 2023-12-12 ENCOUNTER — READMISSION MANAGEMENT (OUTPATIENT)
Dept: CALL CENTER | Facility: HOSPITAL | Age: 58
End: 2023-12-12
Payer: COMMERCIAL

## 2023-12-12 NOTE — OUTREACH NOTE
CT Surgery Week 1 Survey      Flowsheet Row Responses   [facility] patient discharged from? [facility]   Does the patient have one of the following disease processes/diagnoses(primary or secondary)? Cardiothoracic surgery   Week 1 attempt successful? Yes   Call start time 1126   Call end time 1130   Discharge diagnosis CORONARY ARTERY BYPASS GRAFTING   Person spoke with today (if not patient) and relationship Patient   Meds reviewed with patient/caregiver? Yes   Is the patient having any side effects they believe may be caused by any medication additions or changes? No   Does the patient have all medications related to this admission filled (includes all antibiotics, pain medications, cardiac medications, etc.) Yes   Is the patient taking all medications as directed (includes completed medication regime)? Yes   Comments regarding appointments Cardiology f/u appt on 12/20/23. Cardiac surgery f/u appt on 12/28/23   Does the patient have a primary care provider?  Yes   Does the patient have an appointment scheduled with their C/T surgeon? Yes   Has the patient kept scheduled appointments due by today? N/A   Has home health visited the patient within 72 hours of discharge? N/A   What DME was ordered? Sharon to provide nocturanl oxygen.   Has all DME been delivered? Yes   DME comments Oxygen 2 LPM via nasal cannula, nocturnal.   Psychosocial issues? No   Comments Brief call. Pt states he is doing well. No questions or concerns.   Did the patient receive a copy of their discharge instructions? Yes   Nursing interventions Reviewed instructions with patient   What is the patient's perception of their health status since discharge? Improving   Is the patient /caregiver able to teach back basic post-op care? Shower daily, No tub bath, swimming, or hot tub until instructed by MD, Use a clean wash cloth and antibacterial bar or liquid soap to clean incisions, If the steri-strips are falling off, it is okay to remove them. (If  applicable), Lifting as instructed by MD in discharge instructions, Drive as instructed by MD in discharge instructions   Is the patient/caregiver able to teach back signs and symptoms of incisional infection? Increased redness, swelling or pain at the incisonal site, Fever, Pus or odor from incision, Increased drainage or bleeding   Is the patient/caregiver able to teach back steps to recovery at home? Rest and rebuild strength, gradually increase activity   Is the patient/caregiver able to teach back the hierarchy of who to call/visit for symptoms/problems? PCP, Specialist, Home health nurse, Urgent Care, ED, 911 Yes   Week 1 call completed? Yes   Graduated Yes   Is the patient interested in additional calls from an ambulatory ? No   Would this patient benefit from a Referral to Amb Social Work? No   Wrap up additional comments Pt understands all his d/c post op care and has all f/u appts scheduled. no needs or concerns.   Call end time 1130              Brisa TELLEZ - Registered Nurse

## 2023-12-15 NOTE — PROGRESS NOTES
Subjective:     Encounter Date:12/20/2023      Patient ID: John Paul Tineo is a 58 y.o. male.    Chief Complaint:  History of Present Illness    John Paul Tineo is a pleasant  58 y.o. male who presents to the office today for hospital discharge follow up for which patient was admitted from 12/1-12/8/2023 following abnormal cardiac catheterization which showed severe three-vessel disease including 70-80% left main, 100% LAD with collaterals, 70 to 80% LCx, and 80 to 90% RCA.  CT surgery consulted for evaluation of CABG, patient underwent CABG x 3 with Dr. RAMSEY on 12/3/2023.  Patient did well following surgery and was discharged home on POD 5 with night time oxygen.  He has not yet been evaluated by CT surgery, scheduled follow-up next week.  Patient seen and examined, labs and chart reviewed. Patient states he is doing well today with no current cardiac complaints. Patient currently denies chest pain, shortness of breath, edema, fatigue/weakness, lightheadedness/dizziness, nausea, vomiting, syncope.  Patient's vital signs are stable today.  He takes all medications as prescribed and is tolerating them well.     The following portions of the patient's history were reviewed and updated as appropriate: allergies, current medications, past family history, past medical history, past social history, past surgical history, and problem list.    Past Medical History:   Diagnosis Date    Chest pain     Hyperlipidemia     Shortness of breath      Past Surgical History:   Procedure Laterality Date    CARDIAC CATHETERIZATION N/A 12/1/2023    Procedure: Left Heart Cath with angiogram;  Surgeon: Fred Lopez MD;  Location: [facility] CATH INVASIVE LOCATION;  Service: Cardiovascular;  Laterality: N/A;    CARDIAC CATHETERIZATION N/A 12/1/2023    Procedure: Left ventriculography;  Surgeon: Fred Lopez MD;  Location: [facility] CATH INVASIVE LOCATION;  Service: Cardiovascular;  Laterality: N/A;    COLONOSCOPY      CORONARY ARTERY BYPASS  "GRAFT N/A 12/3/2023    Procedure: CORONARY ARTERY BYPASS GRAFTING;  Surgeon: Jeffery Salmon MD;  Location: [facility] CVOR;  Service: Cardiothoracic;  Laterality: N/A;  CABG x3 including LIMA.    KNEE ARTHROSCOPY Left 2020    Procedure: KNEE ARTHROSCOPY with partial and lateral  MENISCECTOMY AND CHONROPLASTY;  Surgeon: Yovany Jacobs II, MD;  Location: [facility] MAIN OR;  Service: Orthopedics;  Laterality: Left;     /68 (BP Location: Left arm, Patient Position: Sitting, Cuff Size: Adult)   Pulse 73   Ht 170.2 cm (67\")   Wt 89.1 kg (196 lb 6.4 oz)   SpO2 96%   BMI 30.76 kg/m²   Family History   Problem Relation Age of Onset    Hyperlipidemia Mother     Hyperlipidemia Father     Heart attack Father        Current Outpatient Medications:     aspirin 81 MG EC tablet, Take 1 tablet by mouth Daily., Disp: , Rfl:     metoprolol tartrate (LOPRESSOR) 25 MG tablet, Take 1 tablet by mouth Every 12 (Twelve) Hours for 180 days., Disp: 180 tablet, Rfl: 1    rosuvastatin (CRESTOR) 20 MG tablet, Take 1 tablet by mouth Daily., Disp: 90 tablet, Rfl: 1    furosemide (LASIX) 20 MG tablet, Take 1 tablet by mouth Daily for 10 days., Disp: 10 tablet, Rfl: 0  No Known Allergies  Social History     Socioeconomic History    Marital status:    Tobacco Use    Smoking status: Former     Packs/day: 1.00     Years: 15.00     Additional pack years: 0.00     Total pack years: 15.00     Types: Cigarettes     Quit date: 2023     Years since quittin.1     Passive exposure: Past    Smokeless tobacco: Never   Vaping Use    Vaping Use: Never used   Substance and Sexual Activity    Alcohol use: Yes     Alcohol/week: 24.0 standard drinks of alcohol     Types: 24 Cans of beer per week     Comment: has obstained for 30 days 2023    Drug use: Never    Sexual activity: Defer     Review of Systems   Constitutional: Negative for malaise/fatigue.   Cardiovascular:  Negative for chest pain, dyspnea on exertion, " leg swelling and palpitations.   Respiratory:  Negative for cough and shortness of breath.    Gastrointestinal:  Negative for abdominal pain, nausea and vomiting.   Neurological:  Negative for dizziness, focal weakness, headaches, light-headedness and numbness.   All other systems reviewed and are negative.             Objective:     Vitals reviewed.   Constitutional:       Appearance: Healthy appearance. Overweight and not in distress.   Eyes:      Pupils: Pupils are equal, round, and reactive to light.   HENT:      Nose: Nose normal.    Mouth/Throat:      Pharynx: Oropharynx is clear.   Pulmonary:      Effort: Pulmonary effort is normal.      Breath sounds: Normal breath sounds.   Cardiovascular:      Normal rate. Regular rhythm.   Pulses:     Intact distal pulses.   Edema:     Peripheral edema absent.   Abdominal:      General: Bowel sounds are normal.   Musculoskeletal: Normal range of motion.      Cervical back: Normal range of motion and neck supple. Skin:     General: Skin is warm.   Neurological:      General: No focal deficit present.      Mental Status: Alert and oriented to person, place and time.       Procedures    Lab Review:    Diagnosis Plan   1. S/P CABG x 3 with LIMA by Dr. Salmon 12/3/2023  metoprolol tartrate (LOPRESSOR) 25 MG tablet      2. Hospital discharge follow-up        3. Coronary artery disease involving native coronary artery of native heart without angina pectoris  metoprolol tartrate (LOPRESSOR) 25 MG tablet    rosuvastatin (CRESTOR) 20 MG tablet      4. Dyslipidemia  rosuvastatin (CRESTOR) 20 MG tablet      5. Tobacco abuse        LAB RESULTS (LAST 7 DAYS)    Echocardiogram   Results for orders placed during the hospital encounter of 12/01/23    Adult Transthoracic Echo Complete w/ Color, Spectral and Contrast if Necessary Per Protocol    Interpretation Summary    Left ventricular ejection fraction appears to be 61 - 65%.    Saline test results are negative.      CBC         BMP        CMP         BNP        TROPONIN        CoAg        Creatinine Clearance  Estimated Creatinine Clearance: 118.9 mL/min (A) (by C-G formula based on SCr of 0.73 mg/dL (L)).    ABG        Radiology  No radiology results for the last day          MDM    Plan/recommendations:  Coronary artery disease  S/p CABG x 3 with LIMA to LAD, SVG to OM and RCA (12/2023)   Continue aspirin, statin, beta-blocker-- refills sent to pharmacy   Patient has finished 10-day prescription of Lasix following hospitalization, no complaints of edema/shortness of breath today  Reports he is no longer requiring nighttime oxygen as his O2 sats have been stable with nightly checks  Patient currently denies chest pain  CT surgery follow-up appointment next Thursday  Midsternal incision clean dry and intact with no erythema, drainage, warmth noted  Recommend cardiac rehab when cleared by CT surgery  Post open heart instructions reviewed to include: lifting restriction of 10 lbs until 6 weeks and 50 lbs until 12 weeks from the date of surgery, no excessive jarring motions or twisting motions until 12 weeks from the date of surgery    Hypertension  Blood pressure today 111/68   Continue Lopressor 25 mg twice daily    Hyperlipidemia  Continue statin therapy  Recent lipid panel with   Goal LDL less than 70  Recheck in 6 months    Tobacco user  Former tobacco user  Reports he quit in November  Education provided on the importance of continue smoking cessation in presence of coronary artery disease  Patient will reach out to office or PCP if needs assistance and continued smoking cessation    Recommend hospital follow-up be scheduled with PCP    Patient's previous medical records, labs, and EKG were reviewed and discussed with the patient at today's visit.     Patient to be seen as needed or at next scheduled follow-up with Dr. Lopez    Electronically signed by PEDRO Mendoza, 12/20/23, 9:03 AM EST.   numerical 0-10

## 2023-12-20 ENCOUNTER — OFFICE VISIT (OUTPATIENT)
Dept: CARDIOLOGY | Facility: CLINIC | Age: 58
End: 2023-12-20
Payer: COMMERCIAL

## 2023-12-20 VITALS
SYSTOLIC BLOOD PRESSURE: 111 MMHG | DIASTOLIC BLOOD PRESSURE: 68 MMHG | OXYGEN SATURATION: 96 % | HEART RATE: 73 BPM | HEIGHT: 67 IN | BODY MASS INDEX: 30.83 KG/M2 | WEIGHT: 196.4 LBS

## 2023-12-20 DIAGNOSIS — E78.5 DYSLIPIDEMIA: ICD-10-CM

## 2023-12-20 DIAGNOSIS — I25.10 CORONARY ARTERY DISEASE INVOLVING NATIVE CORONARY ARTERY OF NATIVE HEART WITHOUT ANGINA PECTORIS: ICD-10-CM

## 2023-12-20 DIAGNOSIS — Z72.0 TOBACCO ABUSE: ICD-10-CM

## 2023-12-20 DIAGNOSIS — Z09 HOSPITAL DISCHARGE FOLLOW-UP: ICD-10-CM

## 2023-12-20 DIAGNOSIS — Z95.1 S/P CABG X 3: Primary | ICD-10-CM

## 2023-12-20 RX ORDER — ROSUVASTATIN CALCIUM 20 MG/1
20 TABLET, COATED ORAL DAILY
Qty: 90 TABLET | Refills: 1 | Status: SHIPPED | OUTPATIENT
Start: 2023-12-20

## 2023-12-28 ENCOUNTER — OFFICE VISIT (OUTPATIENT)
Dept: CARDIAC SURGERY | Facility: CLINIC | Age: 58
End: 2023-12-28
Payer: COMMERCIAL

## 2023-12-28 VITALS
TEMPERATURE: 98.2 F | WEIGHT: 203 LBS | SYSTOLIC BLOOD PRESSURE: 125 MMHG | OXYGEN SATURATION: 97 % | BODY MASS INDEX: 31.86 KG/M2 | HEART RATE: 70 BPM | RESPIRATION RATE: 18 BRPM | DIASTOLIC BLOOD PRESSURE: 82 MMHG | HEIGHT: 67 IN

## 2023-12-28 DIAGNOSIS — Z95.1 S/P CABG X 3: Primary | ICD-10-CM

## 2023-12-28 DIAGNOSIS — E78.5 DYSLIPIDEMIA: ICD-10-CM

## 2023-12-28 PROCEDURE — 99024 POSTOP FOLLOW-UP VISIT: CPT | Performed by: NURSE PRACTITIONER

## 2023-12-28 NOTE — PROGRESS NOTES
"CARDIOVASCULAR SURGERY FOLLOW-UP PROGRESS NOTE    Chief Complaint: Post op, s/p CABG x 3    HPI:     John Paul Tineo is a 58 y.o. male who presents to the office today for his postop visit.  He is accompanied by his mother.  He underwent CABG x 3 (LIMA to LAD, SVG to OM, SVG to RCA), EVH of the right leg by Dr. Salmon on 12/3/23. He did well postoperatively and continues to do well.  He was noted to have nocturnal hypoxia during his hospital stay, he is no longer using oxygen at nighttime as his O2 sats have been stable with nightly checks.  His incision is healing well without issue.  He is staying active and has yet to hear from about his referral to cardiac rehab.  He followed up with his cardiologist 1 week ago.  He works as a  and is asking when he can return to work.  He has completely quit smoking.     Physical exam:  /82 (BP Location: Left arm, Patient Position: Sitting, Cuff Size: Adult)   Pulse 70   Temp 98.2 °F (36.8 °C)   Resp 18   Ht 170.2 cm (67\")   Wt 92.1 kg (203 lb)   SpO2 97%   BMI 31.79 kg/m²     Vitals reviewed.   Constitutional:       General: Not in acute distress.     Appearance: Healthy appearance. Not in distress. Obese. Not ill-appearing.   Pulmonary:      Effort: Pulmonary effort is normal.      Breath sounds: Normal breath sounds.   Cardiovascular:      Normal rate. Regular rhythm.   Edema:     Peripheral edema absent.   Skin:     General: Skin is warm and dry.        Neurological:      Mental Status: Alert and oriented to person, place and time.   Psychiatric:         Behavior: Behavior is cooperative.                   Assessment/Plan:     John Paul Tineo is a 58-year-old male status post CABGx3. Overall, he is doing well.  His incision has healed well.  He is no longer requiring nocturnal oxygen.  He is tolerating his medication regimen without issue.  He may return to work on 1/15/2024 with restriction.  He was provided a return to work note.  We have " placed a referral to cardiac rehab.    Recommendations:  No heavy lifting > 10 pounds for 3 more weeks, followed by no heavy lifting over 50 pounds for an additional 6 weeks  Keep incisions clean and dry  OK to drive if not taking narcotic pain medicine  OK to begin cardiac rehab  Follow-up with CT surgery prn    Thank you for allowing me to participate in the care of your patient.    Bernice Delong, APRN

## 2024-01-02 ENCOUNTER — TELEPHONE (OUTPATIENT)
Dept: CARDIAC REHAB | Facility: HOSPITAL | Age: 59
End: 2024-01-02
Payer: COMMERCIAL

## 2024-01-02 DIAGNOSIS — Z95.1 S/P CABG X 3: Primary | ICD-10-CM

## 2024-01-03 ENCOUNTER — TELEPHONE (OUTPATIENT)
Dept: CARDIAC REHAB | Facility: HOSPITAL | Age: 59
End: 2024-01-03
Payer: COMMERCIAL

## 2024-01-03 NOTE — TELEPHONE ENCOUNTER
Deductible: 3500  Once met,  Co-insurance: 20%  OOP: 6000  Once OOP met covered @ 100%  36v/year  ~yolanda

## 2024-01-04 ENCOUNTER — OFFICE VISIT (OUTPATIENT)
Dept: CARDIAC REHAB | Facility: HOSPITAL | Age: 59
End: 2024-01-04
Payer: COMMERCIAL

## 2024-01-04 DIAGNOSIS — Z95.1 S/P CABG X 3: ICD-10-CM

## 2024-01-04 PROCEDURE — 93798 PHYS/QHP OP CAR RHAB W/ECG: CPT

## 2024-01-09 ENCOUNTER — TREATMENT (OUTPATIENT)
Dept: CARDIAC REHAB | Facility: HOSPITAL | Age: 59
End: 2024-01-09
Payer: COMMERCIAL

## 2024-01-09 DIAGNOSIS — Z95.1 S/P CABG X 3: Primary | ICD-10-CM

## 2024-01-09 PROCEDURE — 93798 PHYS/QHP OP CAR RHAB W/ECG: CPT

## 2024-01-11 ENCOUNTER — TREATMENT (OUTPATIENT)
Dept: CARDIAC REHAB | Facility: HOSPITAL | Age: 59
End: 2024-01-11
Payer: COMMERCIAL

## 2024-01-11 DIAGNOSIS — Z95.1 S/P CABG X 3: Primary | ICD-10-CM

## 2024-01-11 PROCEDURE — 93798 PHYS/QHP OP CAR RHAB W/ECG: CPT

## 2024-01-16 ENCOUNTER — TREATMENT (OUTPATIENT)
Dept: CARDIAC REHAB | Facility: HOSPITAL | Age: 59
End: 2024-01-16
Payer: COMMERCIAL

## 2024-01-16 DIAGNOSIS — Z95.1 S/P CABG X 3: Primary | ICD-10-CM

## 2024-01-16 PROCEDURE — 93798 PHYS/QHP OP CAR RHAB W/ECG: CPT

## 2024-01-18 ENCOUNTER — TREATMENT (OUTPATIENT)
Dept: CARDIAC REHAB | Facility: HOSPITAL | Age: 59
End: 2024-01-18
Payer: COMMERCIAL

## 2024-01-18 DIAGNOSIS — Z95.1 S/P CABG X 3: Primary | ICD-10-CM

## 2024-01-18 PROCEDURE — 93798 PHYS/QHP OP CAR RHAB W/ECG: CPT

## 2024-01-23 ENCOUNTER — TREATMENT (OUTPATIENT)
Dept: CARDIAC REHAB | Facility: HOSPITAL | Age: 59
End: 2024-01-23
Payer: COMMERCIAL

## 2024-01-23 DIAGNOSIS — Z95.1 S/P CABG X 3: Primary | ICD-10-CM

## 2024-01-23 PROCEDURE — 93798 PHYS/QHP OP CAR RHAB W/ECG: CPT

## 2024-01-25 ENCOUNTER — TREATMENT (OUTPATIENT)
Dept: CARDIAC REHAB | Facility: HOSPITAL | Age: 59
End: 2024-01-25
Payer: COMMERCIAL

## 2024-01-25 DIAGNOSIS — Z95.1 S/P CABG X 3: Primary | ICD-10-CM

## 2024-01-25 PROCEDURE — 93798 PHYS/QHP OP CAR RHAB W/ECG: CPT

## 2024-01-30 ENCOUNTER — TREATMENT (OUTPATIENT)
Dept: CARDIAC REHAB | Facility: HOSPITAL | Age: 59
End: 2024-01-30
Payer: COMMERCIAL

## 2024-01-30 DIAGNOSIS — Z95.1 S/P CABG X 3: Primary | ICD-10-CM

## 2024-01-30 PROCEDURE — 93798 PHYS/QHP OP CAR RHAB W/ECG: CPT

## 2024-02-01 ENCOUNTER — TREATMENT (OUTPATIENT)
Dept: CARDIAC REHAB | Facility: HOSPITAL | Age: 59
End: 2024-02-01
Payer: COMMERCIAL

## 2024-02-01 DIAGNOSIS — Z95.1 S/P CABG X 3: Primary | ICD-10-CM

## 2024-02-01 PROCEDURE — 93798 PHYS/QHP OP CAR RHAB W/ECG: CPT

## 2024-02-06 ENCOUNTER — TREATMENT (OUTPATIENT)
Dept: CARDIAC REHAB | Facility: HOSPITAL | Age: 59
End: 2024-02-06
Payer: COMMERCIAL

## 2024-02-06 DIAGNOSIS — Z95.1 S/P CABG X 3: Primary | ICD-10-CM

## 2024-02-06 PROCEDURE — 93798 PHYS/QHP OP CAR RHAB W/ECG: CPT

## 2024-02-08 ENCOUNTER — TREATMENT (OUTPATIENT)
Dept: CARDIAC REHAB | Facility: HOSPITAL | Age: 59
End: 2024-02-08
Payer: COMMERCIAL

## 2024-02-08 DIAGNOSIS — Z95.1 S/P CABG X 3: Primary | ICD-10-CM

## 2024-02-08 PROCEDURE — 93798 PHYS/QHP OP CAR RHAB W/ECG: CPT

## 2024-02-13 ENCOUNTER — TREATMENT (OUTPATIENT)
Dept: CARDIAC REHAB | Facility: HOSPITAL | Age: 59
End: 2024-02-13
Payer: COMMERCIAL

## 2024-02-13 DIAGNOSIS — Z95.1 S/P CABG X 3: Primary | ICD-10-CM

## 2024-02-13 PROCEDURE — 93798 PHYS/QHP OP CAR RHAB W/ECG: CPT

## 2024-02-15 ENCOUNTER — TREATMENT (OUTPATIENT)
Dept: CARDIAC REHAB | Facility: HOSPITAL | Age: 59
End: 2024-02-15
Payer: COMMERCIAL

## 2024-02-15 DIAGNOSIS — Z95.1 S/P CABG X 3: Primary | ICD-10-CM

## 2024-02-15 PROCEDURE — 93798 PHYS/QHP OP CAR RHAB W/ECG: CPT

## 2024-02-20 ENCOUNTER — TREATMENT (OUTPATIENT)
Dept: CARDIAC REHAB | Facility: HOSPITAL | Age: 59
End: 2024-02-20
Payer: COMMERCIAL

## 2024-02-20 DIAGNOSIS — Z95.1 S/P CABG X 3: Primary | ICD-10-CM

## 2024-02-20 PROCEDURE — 93798 PHYS/QHP OP CAR RHAB W/ECG: CPT

## 2024-02-22 ENCOUNTER — TREATMENT (OUTPATIENT)
Dept: CARDIAC REHAB | Facility: HOSPITAL | Age: 59
End: 2024-02-22
Payer: COMMERCIAL

## 2024-02-22 DIAGNOSIS — Z95.1 S/P CABG X 3: Primary | ICD-10-CM

## 2024-02-22 PROCEDURE — 93798 PHYS/QHP OP CAR RHAB W/ECG: CPT

## 2024-02-27 ENCOUNTER — APPOINTMENT (OUTPATIENT)
Dept: CARDIAC REHAB | Facility: HOSPITAL | Age: 59
End: 2024-02-27
Payer: COMMERCIAL

## 2024-02-29 ENCOUNTER — APPOINTMENT (OUTPATIENT)
Dept: CARDIAC REHAB | Facility: HOSPITAL | Age: 59
End: 2024-02-29
Payer: COMMERCIAL

## 2024-04-29 ENCOUNTER — TELEPHONE (OUTPATIENT)
Dept: CARDIOLOGY | Facility: CLINIC | Age: 59
End: 2024-04-29
Payer: COMMERCIAL

## 2024-04-29 NOTE — TELEPHONE ENCOUNTER
Attempted to call dental office and they are closed and I will have to attempt to call them tomorrow.

## 2024-04-29 NOTE — TELEPHONE ENCOUNTER
Emily with [facility].  Phone: [phone]    Is it ok for patient to have a deep cleaning? Patient had bypass in December and is on a blood thinner.

## 2024-04-30 NOTE — TELEPHONE ENCOUNTER
FACILITY: [facility] dental  DR:   PHONE:   FAX:   PROCEDURE: Deep Cleaning  SCHEDULED:   MEDS TO HOLD: ASA

## 2024-04-30 NOTE — TELEPHONE ENCOUNTER
Called [facility] to have them fax over the cardiac clearance request.    Verbally gave fax number over the phone.

## 2024-05-15 ENCOUNTER — OFFICE VISIT (OUTPATIENT)
Dept: CARDIOLOGY | Facility: CLINIC | Age: 59
End: 2024-05-15
Payer: COMMERCIAL

## 2024-05-15 VITALS
DIASTOLIC BLOOD PRESSURE: 69 MMHG | OXYGEN SATURATION: 99 % | BODY MASS INDEX: 32.65 KG/M2 | WEIGHT: 208 LBS | HEIGHT: 67 IN | SYSTOLIC BLOOD PRESSURE: 117 MMHG | HEART RATE: 71 BPM

## 2024-05-15 DIAGNOSIS — I10 PRIMARY HYPERTENSION: ICD-10-CM

## 2024-05-15 DIAGNOSIS — E78.5 DYSLIPIDEMIA: ICD-10-CM

## 2024-05-15 DIAGNOSIS — I25.10 CORONARY ARTERY DISEASE INVOLVING NATIVE CORONARY ARTERY OF NATIVE HEART WITHOUT ANGINA PECTORIS: Primary | ICD-10-CM

## 2024-05-15 PROCEDURE — 99214 OFFICE O/P EST MOD 30 MIN: CPT | Performed by: INTERNAL MEDICINE

## 2024-05-15 RX ORDER — TRAZODONE HYDROCHLORIDE 50 MG/1
50 TABLET ORAL NIGHTLY
COMMUNITY
Start: 2024-04-22

## 2024-05-15 NOTE — PROGRESS NOTES
Subjective:     Encounter Date:05/15/2024      Patient ID: John Paul Tineo is a 58 y.o. male.    Chief Complaint:  History of Present Illness 58-year-old white male with history of coronary disease status post coronary bypass surgery history of hypertension hyperlipidemia presents to the office for follow-up.  Patient is currently stable without any symptoms of chest pain or shortness of breath at rest on exertion.  No complaint of any PND orthopnea.  No palpitation dizziness syncope or swelling of the feet.  Patient is taking all her medicines regularly.  Patient does not smoke    The following portions of the patient's history were reviewed and updated as appropriate: allergies, current medications, past family history, past medical history, past social history, past surgical history, and problem list.  Past Medical History:   Diagnosis Date    Chest pain     Coronary artery disease     Hyperlipidemia     Shortness of breath      Past Surgical History:   Procedure Laterality Date    CARDIAC CATHETERIZATION N/A 12/01/2023    Procedure: Left Heart Cath with angiogram;  Surgeon: Fred Lopez MD;  Location: [facility] CATH INVASIVE LOCATION;  Service: Cardiovascular;  Laterality: N/A;    CARDIAC CATHETERIZATION N/A 12/01/2023    Procedure: Left ventriculography;  Surgeon: Fred Lopez MD;  Location: [facility] CATH INVASIVE LOCATION;  Service: Cardiovascular;  Laterality: N/A;    COLONOSCOPY      CORONARY ARTERY BYPASS GRAFT N/A 12/03/2023    Procedure: CORONARY ARTERY BYPASS GRAFTING;  Surgeon: Jeffery Salmon MD;  Location: [facility] CVOR;  Service: Cardiothoracic;  Laterality: N/A;  CABG x3 including LIMA.    KNEE ARTHROSCOPY Left 08/28/2020    Procedure: KNEE ARTHROSCOPY with partial and lateral  MENISCECTOMY AND CHONROPLASTY;  Surgeon: Yovany Jacobs II, MD;  Location: [facility] MAIN OR;  Service: Orthopedics;  Laterality: Left;     /69 (BP Location: Left arm, Patient Position: Sitting, Cuff  "Size: Adult)   Pulse 71   Ht 170.2 cm (67\")   Wt 94.3 kg (208 lb)   SpO2 99%   BMI 32.58 kg/m²   Family History   Problem Relation Age of Onset    Hyperlipidemia Mother     Hyperlipidemia Father     Heart attack Father     Heart failure Father        Current Outpatient Medications:     aspirin 81 MG EC tablet, Take 1 tablet by mouth Daily., Disp: , Rfl:     metoprolol tartrate (LOPRESSOR) 25 MG tablet, Take 1 tablet by mouth Every 12 (Twelve) Hours for 180 days., Disp: 180 tablet, Rfl: 1    rosuvastatin (CRESTOR) 20 MG tablet, Take 1 tablet by mouth Daily., Disp: 90 tablet, Rfl: 1    traZODone (DESYREL) 50 MG tablet, Take 1 tablet by mouth Every Night. (Patient not taking: Reported on 5/15/2024), Disp: , Rfl:   No Known Allergies  Social History     Socioeconomic History    Marital status:    Tobacco Use    Smoking status: Former     Current packs/day: 0.00     Average packs/day: 1 pack/day for 35.0 years (35.0 ttl pk-yrs)     Types: Cigarettes     Start date: 2008     Quit date: 2023     Years since quittin.5     Passive exposure: Past    Smokeless tobacco: Never   Vaping Use    Vaping status: Never Used   Substance and Sexual Activity    Alcohol use: Yes     Alcohol/week: 24.0 standard drinks of alcohol     Types: 24 Cans of beer per week     Comment: has obstained for 30 days 2023    Drug use: Never    Sexual activity: Yes     Partners: Female     Birth control/protection: None, Vasectomy     Review of Systems   Constitutional: Negative for malaise/fatigue.   Cardiovascular:  Negative for chest pain, dyspnea on exertion, leg swelling and palpitations.   Respiratory:  Negative for cough and shortness of breath.    Gastrointestinal:  Negative for abdominal pain, nausea and vomiting.   Neurological:  Negative for dizziness, focal weakness, headaches, light-headedness and numbness.   All other systems reviewed and are negative.             Objective:     Constitutional:       " Appearance: Well-developed.   Eyes:      General: No scleral icterus.     Conjunctiva/sclera: Conjunctivae normal.   HENT:      Head: Normocephalic and atraumatic.   Neck:      Vascular: No carotid bruit or JVD.   Pulmonary:      Effort: Pulmonary effort is normal.      Breath sounds: Normal breath sounds. No wheezing. No rales.   Cardiovascular:      Normal rate. Regular rhythm.   Pulses:     Intact distal pulses.   Abdominal:      General: Bowel sounds are normal.      Palpations: Abdomen is soft.   Musculoskeletal:      Cervical back: Normal range of motion and neck supple. Skin:     General: Skin is warm and dry.      Findings: No rash.   Neurological:      Mental Status: Alert.       Procedures    Lab Review:         MDM    #1 coronary artery disease  Patient had coronary bypass surgery x 3 vessels with a LIMA to LAD and saphenous graft to the marginal branch and RCA and is currently stable on medications with normal LV function  2.  Hypertension  Patient blood pressure currently stable on metoprolol  3.  Hyperlipidemia  Patient is on Crestor and the lipid levels are well within normal limits.    Patient's previous medical records, labs, and EKG were reviewed and discussed with the patient at today's visit.

## 2024-05-20 ENCOUNTER — HOSPITAL ENCOUNTER (EMERGENCY)
Facility: HOSPITAL | Age: 59
Discharge: HOME | End: 2024-05-20
Admitting: EMERGENCY MEDICINE
Payer: COMMERCIAL

## 2024-05-20 ENCOUNTER — APPOINTMENT (OUTPATIENT)
Dept: CT IMAGING | Facility: HOSPITAL | Age: 59
End: 2024-05-20
Payer: COMMERCIAL

## 2024-05-20 ENCOUNTER — APPOINTMENT (OUTPATIENT)
Dept: GENERAL RADIOLOGY | Facility: HOSPITAL | Age: 59
End: 2024-05-20
Payer: COMMERCIAL

## 2024-05-20 VITALS
TEMPERATURE: 98.2 F | WEIGHT: 206.57 LBS | OXYGEN SATURATION: 94 % | HEIGHT: 67 IN | SYSTOLIC BLOOD PRESSURE: 128 MMHG | RESPIRATION RATE: 14 BRPM | HEART RATE: 62 BPM | BODY MASS INDEX: 32.42 KG/M2 | DIASTOLIC BLOOD PRESSURE: 65 MMHG

## 2024-05-20 DIAGNOSIS — R42 LIGHTHEADEDNESS: Primary | ICD-10-CM

## 2024-05-20 DIAGNOSIS — R20.2 PARESTHESIAS: ICD-10-CM

## 2024-05-20 DIAGNOSIS — R42 VERTIGO: ICD-10-CM

## 2024-05-20 LAB
ALBUMIN SERPL-MCNC: 4.6 G/DL (ref 3.5–5.2)
ALBUMIN/GLOB SERPL: 1.6 G/DL
ALP SERPL-CCNC: 89 U/L (ref 39–117)
ALT SERPL W P-5'-P-CCNC: 32 U/L (ref 1–41)
ANION GAP SERPL CALCULATED.3IONS-SCNC: 13.4 MMOL/L (ref 5–15)
AST SERPL-CCNC: 30 U/L (ref 1–40)
BASOPHILS # BLD AUTO: 0.06 10*3/MM3 (ref 0–0.2)
BASOPHILS NFR BLD AUTO: 0.8 % (ref 0–1.5)
BILIRUB SERPL-MCNC: 0.5 MG/DL (ref 0–1.2)
BUN SERPL-MCNC: 10 MG/DL (ref 6–20)
BUN/CREAT SERPL: 13.5 (ref 7–25)
CALCIUM SPEC-SCNC: 9.3 MG/DL (ref 8.6–10.5)
CHLORIDE SERPL-SCNC: 101 MMOL/L (ref 98–107)
CO2 SERPL-SCNC: 21.6 MMOL/L (ref 22–29)
CREAT SERPL-MCNC: 0.74 MG/DL (ref 0.76–1.27)
DEPRECATED RDW RBC AUTO: 44.2 FL (ref 37–54)
EGFRCR SERPLBLD CKD-EPI 2021: 105.7 ML/MIN/1.73
EOSINOPHIL # BLD AUTO: 0.08 10*3/MM3 (ref 0–0.4)
EOSINOPHIL NFR BLD AUTO: 1 % (ref 0.3–6.2)
ERYTHROCYTE [DISTWIDTH] IN BLOOD BY AUTOMATED COUNT: 12.6 % (ref 12.3–15.4)
GEN 5 2HR TROPONIN T REFLEX: 8 NG/L
GLOBULIN UR ELPH-MCNC: 2.9 GM/DL
GLUCOSE SERPL-MCNC: 102 MG/DL (ref 65–99)
HCT VFR BLD AUTO: 44.8 % (ref 37.5–51)
HGB BLD-MCNC: 14.8 G/DL (ref 13–17.7)
IMM GRANULOCYTES # BLD AUTO: 0.05 10*3/MM3 (ref 0–0.05)
IMM GRANULOCYTES NFR BLD AUTO: 0.6 % (ref 0–0.5)
LYMPHOCYTES # BLD AUTO: 1.6 10*3/MM3 (ref 0.7–3.1)
LYMPHOCYTES NFR BLD AUTO: 20.6 % (ref 19.6–45.3)
MAGNESIUM SERPL-MCNC: 2.1 MG/DL (ref 1.6–2.6)
MCH RBC QN AUTO: 31.3 PG (ref 26.6–33)
MCHC RBC AUTO-ENTMCNC: 33 G/DL (ref 31.5–35.7)
MCV RBC AUTO: 94.7 FL (ref 79–97)
MONOCYTES # BLD AUTO: 0.57 10*3/MM3 (ref 0.1–0.9)
MONOCYTES NFR BLD AUTO: 7.3 % (ref 5–12)
NEUTROPHILS NFR BLD AUTO: 5.41 10*3/MM3 (ref 1.7–7)
NEUTROPHILS NFR BLD AUTO: 69.7 % (ref 42.7–76)
NRBC BLD AUTO-RTO: 0 /100 WBC (ref 0–0.2)
NT-PROBNP SERPL-MCNC: 41.8 PG/ML (ref 0–900)
PLATELET # BLD AUTO: 283 10*3/MM3 (ref 140–450)
PMV BLD AUTO: 10.2 FL (ref 6–12)
POTASSIUM SERPL-SCNC: 4.3 MMOL/L (ref 3.5–5.2)
PROT SERPL-MCNC: 7.5 G/DL (ref 6–8.5)
RBC # BLD AUTO: 4.73 10*6/MM3 (ref 4.14–5.8)
SODIUM SERPL-SCNC: 136 MMOL/L (ref 136–145)
TROPONIN T DELTA: -1 NG/L
TROPONIN T SERPL HS-MCNC: 9 NG/L
TSH SERPL DL<=0.05 MIU/L-ACNC: 0.96 UIU/ML (ref 0.27–4.2)
WBC NRBC COR # BLD AUTO: 7.77 10*3/MM3 (ref 3.4–10.8)

## 2024-05-20 PROCEDURE — 25810000003 SODIUM CHLORIDE 0.9 % SOLUTION: Performed by: PHYSICIAN ASSISTANT

## 2024-05-20 PROCEDURE — 80050 GENERAL HEALTH PANEL: CPT | Performed by: PHYSICIAN ASSISTANT

## 2024-05-20 PROCEDURE — 83880 ASSAY OF NATRIURETIC PEPTIDE: CPT | Performed by: PHYSICIAN ASSISTANT

## 2024-05-20 PROCEDURE — 99284 EMERGENCY DEPT VISIT MOD MDM: CPT

## 2024-05-20 PROCEDURE — 71045 X-RAY EXAM CHEST 1 VIEW: CPT

## 2024-05-20 PROCEDURE — 84484 ASSAY OF TROPONIN QUANT: CPT | Performed by: PHYSICIAN ASSISTANT

## 2024-05-20 PROCEDURE — 93005 ELECTROCARDIOGRAM TRACING: CPT

## 2024-05-20 PROCEDURE — 83735 ASSAY OF MAGNESIUM: CPT | Performed by: PHYSICIAN ASSISTANT

## 2024-05-20 PROCEDURE — 36415 COLL VENOUS BLD VENIPUNCTURE: CPT

## 2024-05-20 PROCEDURE — 97161 PT EVAL LOW COMPLEX 20 MIN: CPT | Performed by: PHYSICAL THERAPIST

## 2024-05-20 PROCEDURE — 70450 CT HEAD/BRAIN W/O DYE: CPT

## 2024-05-20 RX ORDER — SODIUM CHLORIDE 0.9 % (FLUSH) 0.9 %
10 SYRINGE (ML) INJECTION AS NEEDED
Status: DISCONTINUED | OUTPATIENT
Start: 2024-05-20 | End: 2024-05-20 | Stop reason: HOSPADM

## 2024-05-20 RX ORDER — MECLIZINE HYDROCHLORIDE 25 MG/1
25 TABLET ORAL 3 TIMES DAILY PRN
Qty: 20 TABLET | Refills: 0 | Status: SHIPPED | OUTPATIENT
Start: 2024-05-20

## 2024-05-20 RX ADMIN — SODIUM CHLORIDE 1000 ML: 9 INJECTION, SOLUTION INTRAVENOUS at 09:56

## 2024-05-20 NOTE — THERAPY EVALUATION
Patient Name: John Paul Tineo  : 1965    MRN: [id number]                              Today's Date: 2024       Admit Date: 2024    Visit Dx:     ICD-10-CM ICD-9-CM   1. Lightheadedness  R42 780.4   2. Paresthesias  R20.2 782.0   3. Vertigo  R42 780.4     Patient Active Problem List   Diagnosis    Angina at rest    Abnormal nuclear stress test    CAD (coronary artery disease)    Tobacco abuse    Dyslipidemia    S/P CABG x 3 with LIMA by Dr. Salmon 12/3/2023     Past Medical History:   Diagnosis Date    Chest pain     Coronary artery disease     Hyperlipidemia     Shortness of breath      Past Surgical History:   Procedure Laterality Date    CARDIAC CATHETERIZATION N/A 2023    Procedure: Left Heart Cath with angiogram;  Surgeon: Fred Lopez MD;  Location: [facility] CATH INVASIVE LOCATION;  Service: Cardiovascular;  Laterality: N/A;    CARDIAC CATHETERIZATION N/A 2023    Procedure: Left ventriculography;  Surgeon: Fred Lopez MD;  Location: [facility] CATH INVASIVE LOCATION;  Service: Cardiovascular;  Laterality: N/A;    COLONOSCOPY      CORONARY ARTERY BYPASS GRAFT N/A 2023    Procedure: CORONARY ARTERY BYPASS GRAFTING;  Surgeon: Jeffery Salmon MD;  Location: [facility] CVOR;  Service: Cardiothoracic;  Laterality: N/A;  CABG x3 including LIMA.    KNEE ARTHROSCOPY Left 2020    Procedure: KNEE ARTHROSCOPY with partial and lateral  MENISCECTOMY AND CHONROPLASTY;  Surgeon: Yovany Jacobs II, MD;  Location: [facility] MAIN OR;  Service: Orthopedics;  Laterality: Left;     SUBJECTIVE:  Pt with dizziness and lightheadedness that began this morning. Has not had vertigo before.   CT negative for acute process     OBJECTIVE:  Vestibular Exam    Smooth pursuit: normal   Spontaneous nystagmus: negativ e  Gaze holding nystagmus: negative   Saccades: positive for dizziness   Convergence/divergence: negative   VOR slow: positive for dizziness horizontally   Head thrust test:  not tested   Head shaking nystagmus test: not tested   [place] hallpike for posterior canal: positive (L)   Roll test for horizontal canal: not tested     ASSESSMENT:   Pt presents with a diagnosis of left posterior BPPV and has dizziness and vestibular hypofunction that are limiting his ability to perform ADLs and work activities. He tolerated canalith repositioning maneuvers for posterior canal well today and had minimal symptoms following. Will order OPPT for follow up on vestibular symptoms. Pt in agreement.      Goals:   LTG 1: The patient will be independent in HEP in order to decrease pain and improve tolerance to functional activities.  STATUS: Met    Interventions:   Manual Therapy: CRT     Therapeutic Exercises: none    Modalities: none      PLAN:   home with OPPT        Time Calculation:   PT Evaluation Complexity  History, PT Evaluation Complexity: 1-2 personal factors and/or comorbidities  Examination of Body Systems (PT Eval Complexity): 1-2 elements  Clinical Presentation (PT Evaluation Complexity): stable  Clinical Decision Making (PT Evaluation Complexity): low complexity  Overall Complexity (PT Evaluation Complexity): low complexity     PT Charges       Row Name 05/20/24 1440             Time Calculation    Start Time 1010  -AD      Stop Time 1030  -AD      Time Calculation (min) 20 min  -AD      PT Received On 05/20/24  -AD         Time Calculation- PT    Total Timed Code Minutes- PT 0 minute(s)  -AD                User Key  (r) = Recorded By, (t) = Taken By, (c) = Cosigned By      Initials Name Provider Type    Jane Pillai PT Physical Therapist                  Therapy Charges for Today       Code Description Service Date Service Provider Modifiers Qty    23632423127 HC PT EVAL LOW COMPLEXITY 4 5/20/2024 Jane Marion PT GP 1               PT Discharge Summary  Anticipated Discharge Disposition (PT): home with outpatient therapy services    Jane Marion PT  5/20/2024

## 2024-05-20 NOTE — Clinical Note
[facility] EMERGENCY DEPARTMENT  [address] [place] [address]  Phone: [phone]    John Paul Tineo was seen and treated in our emergency department on 5/20/2024.  He may return to work on 05/21/2024.         Thank you for choosing [facility].    Enriqueta Patterson RN

## 2024-05-20 NOTE — Clinical Note
[facility] EMERGENCY DEPARTMENT  [address] [place] [address]  Phone: [phone]    John Paul Tineo was seen and treated in our emergency department on 5/20/2024.  He may return to work on 05/22/2024.         Thank you for choosing [facility].    Justice Moore PA

## 2024-05-20 NOTE — Clinical Note
[facility] EMERGENCY DEPARTMENT  [address] [place] [address]  Phone: [phone]    John Paul Tineo was seen and treated in our emergency department on 5/20/2024.  He may return to work on 05/22/2024.         Thank you for choosing [facility].    Justice Moore PA

## 2024-05-20 NOTE — ED PROVIDER NOTES
Subjective   History of Present Illness  Patient is a 58-year-old male presents to the ED with reports of lightheadedness and paresthesias in his hands and feet since around 6 or 7 am this morning.  States his symptoms started when he was going to work.  He denies any one-sided numbness, weakness, slurred speech, facial droop, visual changes, or headache.  He also denies any chest pain or shortness of breath no recent medication or food changes.  He denies any abdominal pain, nausea, vomiting, diarrhea, or urinary complaints.  Rates his symptoms as moderate severity.  No syncopal episodes.  No history of neuropathy.  No neck or back pain or injury.    PCP: Maxi Mercado PA-C  Cardiology: Dr. Lopez         Review of Systems   Constitutional: Negative.    HENT:  Negative for congestion, ear discharge, ear pain, facial swelling, sinus pressure and sinus pain.    Respiratory: Negative.     Cardiovascular: Negative.    Gastrointestinal:  Negative for abdominal pain, nausea and vomiting.   Genitourinary:  Negative for difficulty urinating, dysuria, flank pain and frequency.   Musculoskeletal:  Negative for back pain, neck pain and neck stiffness.   Skin: Negative.    Neurological:  Positive for light-headedness. Negative for dizziness, seizures, syncope, speech difficulty, weakness, numbness and headaches.        Paresthesias in hands and feet       Past Medical History:   Diagnosis Date    Chest pain     Coronary artery disease     Hyperlipidemia     Shortness of breath        No Known Allergies    Past Surgical History:   Procedure Laterality Date    CARDIAC CATHETERIZATION N/A 12/01/2023    Procedure: Left Heart Cath with angiogram;  Surgeon: Fred Lopez MD;  Location: [facility] CATH INVASIVE LOCATION;  Service: Cardiovascular;  Laterality: N/A;    CARDIAC CATHETERIZATION N/A 12/01/2023    Procedure: Left ventriculography;  Surgeon: Fred Lopez MD;  Location: [facility] CATH INVASIVE LOCATION;  Service: Cardiovascular;   Laterality: N/A;    COLONOSCOPY      CORONARY ARTERY BYPASS GRAFT N/A 2023    Procedure: CORONARY ARTERY BYPASS GRAFTING;  Surgeon: Jeffery Salmon MD;  Location: [facility] CVOR;  Service: Cardiothoracic;  Laterality: N/A;  CABG x3 including LIMA.    KNEE ARTHROSCOPY Left 2020    Procedure: KNEE ARTHROSCOPY with partial and lateral  MENISCECTOMY AND CHONROPLASTY;  Surgeon: Yovany Jacobs II, MD;  Location: [facility] MAIN OR;  Service: Orthopedics;  Laterality: Left;       Family History   Problem Relation Age of Onset    Hyperlipidemia Mother     Hyperlipidemia Father     Heart attack Father     Heart failure Father        Social History     Socioeconomic History    Marital status:    Tobacco Use    Smoking status: Former     Current packs/day: 0.00     Average packs/day: 1 pack/day for 35.0 years (35.0 ttl pk-yrs)     Types: Cigarettes     Start date: 2008     Quit date: 2023     Years since quittin.5     Passive exposure: Past    Smokeless tobacco: Never   Vaping Use    Vaping status: Never Used   Substance and Sexual Activity    Alcohol use: Yes     Alcohol/week: 24.0 standard drinks of alcohol     Types: 24 Cans of beer per week     Comment: has obstained for 30 days 2023    Drug use: Never    Sexual activity: Yes     Partners: Female     Birth control/protection: None, Vasectomy           Objective   Physical Exam  Vitals and nursing note reviewed.   Constitutional:       General: He is not in acute distress.     Appearance: Normal appearance. He is well-developed. He is not ill-appearing, toxic-appearing or diaphoretic.   HENT:      Head: Normocephalic and atraumatic.      Right Ear: Tympanic membrane, ear canal and external ear normal.      Left Ear: Tympanic membrane, ear canal and external ear normal.      Mouth/Throat:      Mouth: Mucous membranes are moist.      Pharynx: Oropharynx is clear.   Eyes:      General: No scleral icterus.     Extraocular  Movements: Extraocular movements intact.      Pupils: Pupils are equal, round, and reactive to light.   Cardiovascular:      Rate and Rhythm: Normal rate and regular rhythm.      Pulses: Normal pulses.      Heart sounds: No murmur heard.     No friction rub. No gallop.   Pulmonary:      Effort: Pulmonary effort is normal. No tachypnea, accessory muscle usage or respiratory distress.      Breath sounds: Normal breath sounds. No stridor. No decreased breath sounds, wheezing, rhonchi or rales.   Chest:      Chest wall: No mass, deformity, tenderness or crepitus.   Abdominal:      General: Bowel sounds are normal.      Palpations: Abdomen is soft. There is no mass.      Tenderness: There is no abdominal tenderness. There is no guarding or rebound.   Musculoskeletal:      Cervical back: Normal range of motion. No rigidity.   Skin:     General: Skin is warm.      Capillary Refill: Capillary refill takes less than 2 seconds.      Findings: No rash.   Neurological:      General: No focal deficit present.      Mental Status: He is alert and oriented to person, place, and time.      GCS: GCS eye subscore is 4. GCS verbal subscore is 5. GCS motor subscore is 6.      Motor: No weakness or tremor.      Gait: Gait is intact.      Comments: Moving all extremities freely without significant difficulty or pain.  Ambulatory with report steady gait.  Sensation intact.   Psychiatric:         Mood and Affect: Mood normal.         Behavior: Behavior normal.         Procedures           ED Course  ED Course as of 05/20/24 1244   Mon May 20, 2024   0948 XR Chest 1 View  Chest x-ray reviewed pneumonia not thought likely as patient has no symptoms including cough, congestion, or fever [AA]   1106 Patient updated on results and labs.  Will repeat troponin if normal patient is in agreement plan of discharge he was found to be positive for vertigo by Garth PT [AA]      ED Course User Index  [AA] Justice Moore PA    /65 (BP  "Location: Right arm, Patient Position: Lying)   Pulse 62   Temp 98.2 °F (36.8 °C) (Oral)   Resp 14   Ht 170.2 cm (67\")   Wt 93.7 kg (206 lb 9.1 oz)   SpO2 94%   BMI 32.35 kg/m²   Medications   sodium chloride 0.9 % flush 10 mL (has no administration in time range)   sodium chloride 0.9 % bolus 1,000 mL (0 mL Intravenous Stopped 5/20/24 1059)     Labs Reviewed   COMPREHENSIVE METABOLIC PANEL - Abnormal; Notable for the following components:       Result Value    Glucose 102 (*)     Creatinine 0.74 (*)     CO2 21.6 (*)     All other components within normal limits    Narrative:     GFR Normal >60  Chronic Kidney Disease <60  Kidney Failure <15     CBC WITH AUTO DIFFERENTIAL - Abnormal; Notable for the following components:    Immature Grans % 0.6 (*)     All other components within normal limits   BNP (IN-HOUSE) - Normal    Narrative:     This assay is used as an aid in the diagnosis of individuals suspected of having heart failure. It can be used as an aid in the diagnosis of acute decompensated heart failure (ADHF) in patients presenting with signs and symptoms of ADHF to the emergency department (ED). In addition, NT-proBNP of <300 pg/mL indicates ADHF is not likely.    Age Range Result Interpretation  NT-proBNP Concentration (pg/mL:      <50             Positive            >450                   Gray                 300-450                    Negative             <300    50-75           Positive            >900                  Gray                300-900                  Negative            <300      >75             Positive            >1800                  Gray                300-1800                  Negative            <300   TROPONIN - Normal    Narrative:     High Sensitive Troponin T Reference Range:  <14.0 ng/L- Negative Female for AMI  <22.0 ng/L- Negative Male for AMI  >=14 - Abnormal Female indicating possible myocardial injury.  >=22 - Abnormal Male indicating possible myocardial injury. "   Clinicians would have to utilize clinical acumen, EKG, Troponin, and serial changes to determine if it is an Acute Myocardial Infarction or myocardial injury due to an underlying chronic condition.        TSH - Normal   MAGNESIUM - Normal   HIGH SENSITIVITIY TROPONIN T 2HR - Normal    Narrative:     High Sensitive Troponin T Reference Range:  <14.0 ng/L- Negative Female for AMI  <22.0 ng/L- Negative Male for AMI  >=14 - Abnormal Female indicating possible myocardial injury.  >=22 - Abnormal Male indicating possible myocardial injury.   Clinicians would have to utilize clinical acumen, EKG, Troponin, and serial changes to determine if it is an Acute Myocardial Infarction or myocardial injury due to an underlying chronic condition.        CBC AND DIFFERENTIAL    Narrative:     The following orders were created for panel order CBC & Differential.  Procedure                               Abnormality         Status                     ---------                               -----------         ------                     CBC Auto Differential[[id number]]        Abnormal            Final result                 Please view results for these tests on the individual orders.     CT Head Without Contrast   Final Result   No acute intracranial abnormality.            Electronically Signed: Delvin Wen MD     5/20/2024 9:58 AM EDT     Workstation ID: [id number]      XR Chest 1 View   Final Result   Impression:   Left basilar airspace opacity which could represent scarring or atelectasis. Pneumonia not excluded in the correct clinical setting.      Recommend follow-up as clinically indicated         Electronically Signed: Delvin Wen MD     5/20/2024 9:41 AM EDT     Workstation ID: [id number]                                                 Medical Decision Making  Chart Review:   -Echocardiogram reviewed from 12/2/2023 showed an EF of 61 to 65%.  -Cardiac cath reviewed from 12/1/2023 severe three-vessel and left main  disease recommended urgent surgical consult    Differentials: Arrhythmia, electrolyte abnormality, CHF, vertigo, tumor dehydration     ;this list is not all inclusive and does not constitute the entirety of considered causes  EKG reviewed by myself independently interpreted by Dr. Teresa shows sinus rhythm rate 66 when compared to previous EKG from 12/5/2023 no significant change.  Labs: as above   Radiology: Chest x-ray reviewed by myself and independently interpreted by Dr. Teresa shows left basilar airspace opacity correlate with radiologist interpretation as below (pneumonia considered but not thought likely as patient is not symptomatic)       Disposition/Treatment:  Appropriate PPE was worn during exam and throughout all encounters with the patient.  Due to significant overcrowding in the emergency department patient was evaluated by myself in a hallway bed.  This exam may be limited by privacy, noise, and the patient not wearing a hospital gown.  Explained to the patient our limitations in overcrowding.  They were in agreement to continue the exam and treatment at this time.    Patient presents to the ED with reports of lightheadedness and paresthesias in all 4 extremities started this morning.  He has no acute cranial focal neurodeficits or meningeal signs on exam.  EKG is normal.  Orthostatics unremarkable fourth static hypotension. Labs and imaging were obtained.  Patient was also evaluated by physical therapy.  I spoke to CJ Liang patient was positive for vertigo with improvement of symptoms after treatment.  Patient will be referred to outpatient PT for further management if lightheadedness continues.    In regards to labs, serial troponins within normal limits.  BNP normal.  No signs of severe infection or electrolyte abnormality TSH was normal.  CT head showed no acute intracranial abnormalities and chest x-ray showed no acute cardiopulmonary abnormalities.  He was given fluids while in the ED and upon  reassessment resting quietly reports improvement of symptoms currently.  Findings were discussed with the patient at bedside who was in agreement plan of discharge.  Will be given meclizine for home and outpatient PT referral.  Also advised to follow-up with his PCP if symptoms continue.  Patient was given a work note.  All questions were answered.    This document is intended for medical expert use only. Reading of this document by patients and/or patient's family without participating medical staff guidance may result in misinterpretation and unintended morbidity.  Any interpretation of such data is the responsibility of the patient and/or family member responsible for the patient in concert with their primary or specialist providers, not to be left for sources of online searches such as [employer], [employer] or similar queries. Relying on these approaches to knowledge may result in misinterpretation, misguided goals of care and even death should patients or family members try recommendations outside of the realm of professional medical care in a supervised inpatient environment.               Problems Addressed:  Lightheadedness: complicated acute illness or injury  Paresthesias: complicated acute illness or injury  Vertigo: complicated acute illness or injury    Amount and/or Complexity of Data Reviewed  Labs: ordered. Decision-making details documented in ED Course.  Radiology: ordered. Decision-making details documented in ED Course.    Risk  Prescription drug management.        Final diagnoses:   Lightheadedness   Paresthesias   Vertigo       ED Disposition  ED Disposition       ED Disposition   Discharge    Condition   Stable    Comment   --               Morris Mercado PA-C  [address] [place] [address] [phone]    Schedule an appointment as soon as possible for a visit in 3 days      [facility] EMERGENCY DEPARTMENT  [address] [address] [phone]  Go to    If symptoms worsen         Medication List        New Prescriptions      meclizine 25 MG tablet  Commonly known as: ANTIVERT  Take 1 tablet by mouth 3 (Three) Times a Day As Needed for Dizziness.               Where to Get Your Medications        These medications were sent to [facility] [id number] - [place], [place] - [address] - [phone]  - [phone] FX  [address], [place] [address]      Phone: [phone]   meclizine 25 MG tablet            Justice Moore PA  05/20/24 1158       Justice Moore PA  05/20/24 1240

## 2024-05-20 NOTE — DISCHARGE INSTRUCTIONS
Follow-up with physical therapy on an outpatient basis for further management of vertigo.     Drink plenty of clear fluids and rise from seated position slowly.    Follow-up with your primary care provider in 3-5 days.  If you do not have a primary care provider call [phone] for help in finding one, or you may follow up with [facility] at [phone].    Return to ED for any new or worsening symptoms.

## 2024-05-20 NOTE — PLAN OF CARE
ASSESSMENT:   Pt presents with a diagnosis of left posterior BPPV and has dizziness and vestibular hypofunction that are limiting his ability to perform ADLs and work activities. He tolerated canalith repositioning maneuvers for posterior canal well today and had minimal symptoms following. Will order OPPT for follow up on vestibular symptoms. Pt in agreement.      Goals:   LTG 1: The patient will be independent in HEP in order to decrease pain and improve tolerance to functional activities.  STATUS: Met    Interventions:   Manual Therapy: CRT     Therapeutic Exercises: none    Modalities: none      PLAN:   home with OPPT

## 2024-05-21 LAB
QT INTERVAL: 420 MS
QTC INTERVAL: 440 MS

## 2024-07-25 DIAGNOSIS — I25.10 CORONARY ARTERY DISEASE INVOLVING NATIVE CORONARY ARTERY OF NATIVE HEART WITHOUT ANGINA PECTORIS: ICD-10-CM

## 2024-07-25 DIAGNOSIS — Z95.1 S/P CABG X 3: ICD-10-CM

## 2024-07-25 DIAGNOSIS — E78.5 DYSLIPIDEMIA: ICD-10-CM

## 2024-07-25 RX ORDER — ROSUVASTATIN CALCIUM 20 MG/1
20 TABLET, COATED ORAL DAILY
Qty: 90 TABLET | Refills: 1 | Status: SHIPPED | OUTPATIENT
Start: 2024-07-25

## 2024-07-25 NOTE — TELEPHONE ENCOUNTER
Rx Refill Note  Requested Prescriptions     Pending Prescriptions Disp Refills    metoprolol tartrate (LOPRESSOR) 25 MG tablet 180 tablet 1     Sig: Take 1 tablet by mouth Every 12 (Twelve) Hours for 180 days.      Last office visit with prescribing clinician: 12/20/2023   Last telemedicine visit with prescribing clinician: Visit date not found   Next office visit with prescribing clinician: Visit date not found                         Would you like a call back once the refill request has been completed: [] Yes [] No    If the office needs to give you a call back, can they leave a voicemail: [] Yes [] No    Manju Rangel MA  07/25/24, 16:36 EDT

## 2024-07-25 NOTE — TELEPHONE ENCOUNTER
Rx Refill Note  Requested Prescriptions     Pending Prescriptions Disp Refills    rosuvastatin (CRESTOR) 20 MG tablet 90 tablet 1     Sig: Take 1 tablet by mouth Daily.      Last office visit with prescribing clinician: 12/20/2023   Last telemedicine visit with prescribing clinician: Visit date not found   Next office visit with prescribing clinician: Visit date not found                         Would you like a call back once the refill request has been completed: [] Yes [] No    If the office needs to give you a call back, can they leave a voicemail: [] Yes [] No    Manju Rangel MA  07/25/24, 16:37 EDT

## 2024-11-19 ENCOUNTER — OFFICE VISIT (OUTPATIENT)
Dept: CARDIOLOGY | Facility: CLINIC | Age: 59
End: 2024-11-19
Payer: COMMERCIAL

## 2024-11-19 VITALS
DIASTOLIC BLOOD PRESSURE: 77 MMHG | BODY MASS INDEX: 34.06 KG/M2 | OXYGEN SATURATION: 99 % | HEIGHT: 67 IN | WEIGHT: 217 LBS | SYSTOLIC BLOOD PRESSURE: 145 MMHG | HEART RATE: 61 BPM

## 2024-11-19 DIAGNOSIS — I10 PRIMARY HYPERTENSION: ICD-10-CM

## 2024-11-19 DIAGNOSIS — E78.5 DYSLIPIDEMIA: ICD-10-CM

## 2024-11-19 DIAGNOSIS — I25.10 CORONARY ARTERY DISEASE INVOLVING NATIVE CORONARY ARTERY OF NATIVE HEART WITHOUT ANGINA PECTORIS: Primary | ICD-10-CM

## 2024-11-19 PROCEDURE — 99214 OFFICE O/P EST MOD 30 MIN: CPT | Performed by: INTERNAL MEDICINE

## 2024-11-19 NOTE — PROGRESS NOTES
Subjective:     Encounter Date:11/19/2024      Patient ID: John Paul Tineo is a 59 y.o. male.    Chief Complaint:  Coronary Artery Disease  Pertinent negatives include no chest pain, dizziness, leg swelling, palpitations or shortness of breath.   59-year-old white male with history of coronary disease status post coronary bypass surgery hypertension hyperlipidemia presents to my office for a follow-up.  Patient is currently stable without any symptoms of chest pain or shortness of breath at rest or exertion.  No complaint of any PND orthopnea.  No palpitations dizziness syncope or swelling of the feet.  Patient is taking all the medicines regular.  Patient does not smoke but    The following portions of the patient's history were reviewed and updated as appropriate: allergies, current medications, past family history, past medical history, past social history, past surgical history, and problem list.  Past Medical History:   Diagnosis Date    Chest pain     Coronary artery disease     Hyperlipidemia     Shortness of breath      Past Surgical History:   Procedure Laterality Date    CARDIAC CATHETERIZATION N/A 12/01/2023    Procedure: Left Heart Cath with angiogram;  Surgeon: Fred Lopez MD;  Location: [facility] CATH INVASIVE LOCATION;  Service: Cardiovascular;  Laterality: N/A;    CARDIAC CATHETERIZATION N/A 12/01/2023    Procedure: Left ventriculography;  Surgeon: Fred Lopez MD;  Location: [facility] CATH INVASIVE LOCATION;  Service: Cardiovascular;  Laterality: N/A;    COLONOSCOPY      CORONARY ARTERY BYPASS GRAFT N/A 12/03/2023    Procedure: CORONARY ARTERY BYPASS GRAFTING;  Surgeon: Jeffery Salmon MD;  Location: [facility] CVOR;  Service: Cardiothoracic;  Laterality: N/A;  CABG x3 including LIMA.    KNEE ARTHROSCOPY Left 08/28/2020    Procedure: KNEE ARTHROSCOPY with partial and lateral  MENISCECTOMY AND CHONROPLASTY;  Surgeon: Yovany Jacobs II, MD;  Location: [facility] MAIN OR;  Service:  "Orthopedics;  Laterality: Left;     /77   Pulse 61   Ht 170.2 cm (67.01\")   Wt 98.4 kg (217 lb)   SpO2 99%   BMI 33.98 kg/m²   Family History   Problem Relation Age of Onset    Hyperlipidemia Mother     Hyperlipidemia Father     Heart attack Father     Heart failure Father        Current Outpatient Medications:     aspirin 81 MG EC tablet, Take 1 tablet by mouth Daily., Disp: , Rfl:     metoprolol tartrate (LOPRESSOR) 25 MG tablet, Take 1 tablet by mouth Every 12 (Twelve) Hours for 180 days., Disp: 180 tablet, Rfl: 1    rosuvastatin (CRESTOR) 20 MG tablet, Take 1 tablet by mouth Daily., Disp: 90 tablet, Rfl: 1    meclizine (ANTIVERT) 25 MG tablet, Take 1 tablet by mouth 3 (Three) Times a Day As Needed for Dizziness. (Patient not taking: Reported on 2024), Disp: 20 tablet, Rfl: 0    traZODone (DESYREL) 50 MG tablet, Take 1 tablet by mouth Every Night. (Patient not taking: Reported on 2024), Disp: , Rfl:   No Known Allergies  Social History     Socioeconomic History    Marital status:    Tobacco Use    Smoking status: Former     Current packs/day: 0.00     Average packs/day: 1 pack/day for 35.0 years (35.0 ttl pk-yrs)     Types: Cigarettes     Start date: 2008     Quit date: 2023     Years since quittin.0     Passive exposure: Past    Smokeless tobacco: Never   Vaping Use    Vaping status: Never Used   Substance and Sexual Activity    Alcohol use: Yes     Alcohol/week: 24.0 standard drinks of alcohol     Types: 24 Cans of beer per week     Comment: has obstained for 30 days 2023    Drug use: Never    Sexual activity: Yes     Partners: Female     Birth control/protection: None, Vasectomy     Review of Systems   Constitutional: Negative for malaise/fatigue.   Cardiovascular:  Negative for chest pain, dyspnea on exertion, leg swelling and palpitations.   Respiratory:  Negative for cough and shortness of breath.    Gastrointestinal:  Negative for abdominal pain, nausea " and vomiting.   Neurological:  Negative for dizziness, focal weakness, headaches, light-headedness and numbness.   All other systems reviewed and are negative.             Objective:     Constitutional:       Appearance: Well-developed.   Eyes:      General: No scleral icterus.     Conjunctiva/sclera: Conjunctivae normal.   HENT:      Head: Normocephalic and atraumatic.   Neck:      Vascular: No carotid bruit or JVD.   Pulmonary:      Effort: Pulmonary effort is normal.      Breath sounds: Normal breath sounds. No wheezing. No rales.   Cardiovascular:      Normal rate. Regular rhythm.   Pulses:     Intact distal pulses.   Abdominal:      General: Bowel sounds are normal.      Palpations: Abdomen is soft.   Musculoskeletal:      Cervical back: Normal range of motion and neck supple. Skin:     General: Skin is warm and dry.      Findings: No rash.   Neurological:      Mental Status: Alert.       Procedures    Lab Review:         MDM    #1 coronary artery disease  Patient had coronary bypass 3 x 3 vessels with a LIMA to LAD and SVG to the marginal branch and RCA and is currently stable on medications with normal V function  2.  Hypertension  Patient's blood pressure currently stable on metoprolol  3 hyperlipidemia  Patient is on Crestor and the lipid levels are followed by the primary care doctor.    Patient's previous medical records, labs, and EKG were reviewed and discussed with the patient at today's visit.

## 2025-02-10 DIAGNOSIS — I25.10 CORONARY ARTERY DISEASE INVOLVING NATIVE CORONARY ARTERY OF NATIVE HEART WITHOUT ANGINA PECTORIS: ICD-10-CM

## 2025-02-10 DIAGNOSIS — E78.5 DYSLIPIDEMIA: ICD-10-CM

## 2025-02-10 DIAGNOSIS — Z95.1 S/P CABG X 3: ICD-10-CM

## 2025-02-10 RX ORDER — METOPROLOL TARTRATE 25 MG/1
25 TABLET, FILM COATED ORAL EVERY 12 HOURS SCHEDULED
Qty: 180 TABLET | Refills: 0 | Status: SHIPPED | OUTPATIENT
Start: 2025-02-10 | End: 2025-08-09

## 2025-02-10 RX ORDER — ROSUVASTATIN CALCIUM 20 MG/1
20 TABLET, COATED ORAL DAILY
Qty: 90 TABLET | Refills: 0 | Status: SHIPPED | OUTPATIENT
Start: 2025-02-10

## 2025-02-10 NOTE — TELEPHONE ENCOUNTER
Rx Refill Note  Requested Prescriptions     Pending Prescriptions Disp Refills    rosuvastatin (CRESTOR) 20 MG tablet 90 tablet 0     Sig: Take 1 tablet by mouth Daily.    metoprolol tartrate (LOPRESSOR) 25 MG tablet 180 tablet 0     Sig: Take 1 tablet by mouth Every 12 (Twelve) Hours for 180 days.      Last office visit with prescribing clinician: Dr. Lopez 11/19/24  Last telemedicine visit with prescribing clinician: Visit date not found   Next office visit with prescribing clinician: Dr. Lopez 6/5/25                        Would you like a call back once the refill request has been completed: [] Yes [] No    If the office needs to give you a call back, can they leave a voicemail: [] Yes [] No    Manju Rangel MA  02/10/25, 14:09 EST

## 2025-05-12 DIAGNOSIS — Z95.1 S/P CABG X 3: ICD-10-CM

## 2025-05-12 DIAGNOSIS — I25.10 CORONARY ARTERY DISEASE INVOLVING NATIVE CORONARY ARTERY OF NATIVE HEART WITHOUT ANGINA PECTORIS: ICD-10-CM

## 2025-05-12 DIAGNOSIS — E78.5 DYSLIPIDEMIA: ICD-10-CM

## 2025-05-12 RX ORDER — ROSUVASTATIN CALCIUM 20 MG/1
20 TABLET, COATED ORAL DAILY
Qty: 30 TABLET | Refills: 0 | Status: SHIPPED | OUTPATIENT
Start: 2025-05-12

## 2025-05-12 RX ORDER — METOPROLOL TARTRATE 25 MG/1
25 TABLET, FILM COATED ORAL EVERY 12 HOURS SCHEDULED
Qty: 60 TABLET | Refills: 0 | Status: SHIPPED | OUTPATIENT
Start: 2025-05-12 | End: 2025-11-08

## 2025-05-12 NOTE — TELEPHONE ENCOUNTER
Rx Refill Note  Requested Prescriptions     Pending Prescriptions Disp Refills    rosuvastatin (CRESTOR) 20 MG tablet 30 tablet 0     Sig: Take 1 tablet by mouth Daily.    metoprolol tartrate (LOPRESSOR) 25 MG tablet 60 tablet 0     Sig: Take 1 tablet by mouth Every 12 (Twelve) Hours for 180 days.      Last office visit with prescribing clinician: 11/19/2024   Last telemedicine visit with prescribing clinician: Visit date not found   Next office visit with prescribing clinician: 6/5/2025                         Would you like a call back once the refill request has been completed: [] Yes [] No    If the office needs to give you a call back, can they leave a voicemail: [] Yes [] No    Manju Rangel MA  05/12/25, 09:19 EDT

## 2025-05-19 ENCOUNTER — PATIENT MESSAGE (OUTPATIENT)
Dept: CARDIOLOGY | Facility: CLINIC | Age: 60
End: 2025-05-19
Payer: COMMERCIAL

## 2025-06-04 ENCOUNTER — TELEPHONE (OUTPATIENT)
Dept: CARDIOLOGY | Facility: CLINIC | Age: 60
End: 2025-06-04
Payer: COMMERCIAL

## 2025-06-04 NOTE — TELEPHONE ENCOUNTER
Called patient and no answer, left detailed message.    Labs have not been completed yet that were placed 2/10/25

## 2025-06-05 ENCOUNTER — OFFICE VISIT (OUTPATIENT)
Dept: CARDIOLOGY | Facility: CLINIC | Age: 60
End: 2025-06-05
Payer: COMMERCIAL

## 2025-06-05 VITALS
HEIGHT: 67 IN | SYSTOLIC BLOOD PRESSURE: 130 MMHG | HEART RATE: 67 BPM | OXYGEN SATURATION: 99 % | BODY MASS INDEX: 34.53 KG/M2 | DIASTOLIC BLOOD PRESSURE: 80 MMHG | WEIGHT: 220 LBS

## 2025-06-05 DIAGNOSIS — I10 PRIMARY HYPERTENSION: ICD-10-CM

## 2025-06-05 DIAGNOSIS — I25.10 CORONARY ARTERY DISEASE INVOLVING NATIVE CORONARY ARTERY OF NATIVE HEART WITHOUT ANGINA PECTORIS: Primary | ICD-10-CM

## 2025-06-05 DIAGNOSIS — E78.5 DYSLIPIDEMIA: ICD-10-CM

## 2025-06-05 PROCEDURE — 99214 OFFICE O/P EST MOD 30 MIN: CPT | Performed by: INTERNAL MEDICINE

## 2025-06-05 NOTE — TELEPHONE ENCOUNTER
Patient did call back to our office and stated he is going to be bringing the labs from pcp office to review.

## 2025-06-05 NOTE — PROGRESS NOTES
Subjective:     Encounter Date:06/05/2025      Patient ID: John Paul Tineo is a 59 y.o. male.    Chief Complaint:  Coronary Artery Disease  Pertinent negatives include no chest pain, dizziness, leg swelling, palpitations or shortness of breath.     59-year-old white male with history of coronary disease hypertension dyslipidemia presents to my office for a follow-up.  Patient is currently stable without any symptoms of chest pain or shortness of breath at rest or exertion.  No complaints of any PND orthopnea.  No palpitations dizziness syncope or swelling of the feet.  Patient is taking all the medicines regular.  Patient does not smoke.  The following portions of the patient's history were reviewed and updated as appropriate: allergies, current medications, past family history, past medical history, past social history, past surgical history, and problem list.  Past Medical History:   Diagnosis Date    Chest pain     Coronary artery disease     Hyperlipidemia     Shortness of breath      Past Surgical History:   Procedure Laterality Date    CARDIAC CATHETERIZATION N/A 12/01/2023    Procedure: Left Heart Cath with angiogram;  Surgeon: Fred Lopez MD;  Location: [facility] CATH INVASIVE LOCATION;  Service: Cardiovascular;  Laterality: N/A;    CARDIAC CATHETERIZATION N/A 12/01/2023    Procedure: Left ventriculography;  Surgeon: Fred Lopez MD;  Location: [facility] CATH INVASIVE LOCATION;  Service: Cardiovascular;  Laterality: N/A;    COLONOSCOPY      CORONARY ARTERY BYPASS GRAFT N/A 12/03/2023    Procedure: CORONARY ARTERY BYPASS GRAFTING;  Surgeon: Jeffery Salmon MD;  Location: [facility] CVOR;  Service: Cardiothoracic;  Laterality: N/A;  CABG x3 including LIMA.    KNEE ARTHROSCOPY Left 08/28/2020    Procedure: KNEE ARTHROSCOPY with partial and lateral  MENISCECTOMY AND CHONROPLASTY;  Surgeon: Yovany Jacobs II, MD;  Location: [facility] MAIN OR;  Service: Orthopedics;  Laterality: Left;     /80   " Pulse 67   Ht 170.2 cm (67.01\")   Wt 99.8 kg (220 lb)   SpO2 99%   BMI 34.45 kg/m²   Family History   Problem Relation Age of Onset    Hyperlipidemia Mother     Hyperlipidemia Father     Heart attack Father     Heart failure Father        Current Outpatient Medications:     aspirin 81 MG EC tablet, Take 1 tablet by mouth Daily., Disp: , Rfl:     metoprolol tartrate (LOPRESSOR) 25 MG tablet, Take 1 tablet by mouth Every 12 (Twelve) Hours for 180 days., Disp: 60 tablet, Rfl: 0    rosuvastatin (CRESTOR) 20 MG tablet, Take 1 tablet by mouth Daily., Disp: 30 tablet, Rfl: 0  No Known Allergies  Social History     Socioeconomic History    Marital status:    Tobacco Use    Smoking status: Former     Current packs/day: 0.00     Average packs/day: 1 pack/day for 35.0 years (35.0 ttl pk-yrs)     Types: Cigarettes     Start date: 2008     Quit date: 2023     Years since quittin.5     Passive exposure: Past    Smokeless tobacco: Never   Vaping Use    Vaping status: Never Used   Substance and Sexual Activity    Alcohol use: Yes     Alcohol/week: 24.0 standard drinks of alcohol     Types: 24 Cans of beer per week     Comment: has obstained for 30 days 2023    Drug use: Never    Sexual activity: Yes     Partners: Female     Birth control/protection: None, Vasectomy     Review of Systems   Constitutional: Negative for malaise/fatigue.   Cardiovascular:  Negative for chest pain, dyspnea on exertion, leg swelling and palpitations.   Respiratory:  Negative for cough and shortness of breath.    Gastrointestinal:  Negative for abdominal pain, nausea and vomiting.   Neurological:  Negative for dizziness, headaches, light-headedness, numbness and weakness.   All other systems reviewed and are negative.             Objective:     Constitutional:       Appearance: Well-developed.   Eyes:      General: No scleral icterus.     Conjunctiva/sclera: Conjunctivae normal.   HENT:      Head: Normocephalic and " atraumatic.   Neck:      Vascular: No carotid bruit or JVD.   Pulmonary:      Effort: Pulmonary effort is normal.      Breath sounds: Normal breath sounds. No wheezing. No rales.   Cardiovascular:      Normal rate. Regular rhythm.   Pulses:     Intact distal pulses.   Abdominal:      General: Bowel sounds are normal.      Palpations: Abdomen is soft.   Musculoskeletal:      Cervical back: Normal range of motion and neck supple. Skin:     General: Skin is warm and dry.      Findings: No rash.   Neurological:      Mental Status: Alert.       Procedures    Lab Review:         MDM    1 coronary artery disease  Patient had coronary artery bypass surgery x 3 vessels with a LIMA to LAD and SVG to the marginal branch digoxin and is currently stable on medical therapy without any angina and has normal LV function  2.  Hypertension  Patient blood pressure currently stable on metoprolo  3.  Hyperlipidemia  Patient is on  Crestor the lipid levels are well within normal limits.    Patient's previous medical records, labs, and EKG were reviewed and discussed with the patient at today's visit.

## (undated) DEVICE — TBG IRRI TUR Y/TYP NONVENT 98IN LF

## (undated) DEVICE — ANTIBACTERIAL UNDYED BRAIDED (POLYGLACTIN 910), SYNTHETIC ABSORBABLE SUTURE: Brand: COATED VICRYL

## (undated) DEVICE — BLOOD TRANSFUSION FILTER: Brand: HAEMONETICS

## (undated) DEVICE — SUT PDS 0 CT-1 Z340H

## (undated) DEVICE — PINNACLE INTRODUCER SHEATH: Brand: PINNACLE

## (undated) DEVICE — GLV SURG SENSICARE PI ORTHO SZ8.5 LF STRL

## (undated) DEVICE — SAFELINER OUTER SHELL SUCTION CANISTER: Brand: DEROYAL

## (undated) DEVICE — 500ML,PRESSURE INFUSER W/STOPCOCK: Brand: MEDLINE

## (undated) DEVICE — SYR LL TP 10ML STRL

## (undated) DEVICE — BANDAGE,GAUZE,BULKEE II,4.5"X4.1YD,STRL: Brand: MEDLINE

## (undated) DEVICE — CUFF TOURNI 1BLADDER 1PRT 34IN STRL

## (undated) DEVICE — APPL CHLORAPREP HI/LITE 26ML ORNG

## (undated) DEVICE — Device

## (undated) DEVICE — SUT PROLN 5/0 V5 36IN 8934H

## (undated) DEVICE — SYS PERFUS SEP PLATLT W TIPS CUST

## (undated) DEVICE — CATH TDILUT SWANGANZ VIP 7.5F 110CM

## (undated) DEVICE — GW DIAG EMERALD HEPCOAT MOVE JTIP STD .035 3MM 150CM

## (undated) DEVICE — DRN WND CH RND FUL/FLUT NO/TROC 3/8IN 28F

## (undated) DEVICE — HEMOCONCENTRATOR PERFUS LPS06

## (undated) DEVICE — CANN ART EOPA 3D NV W/CONN 20F

## (undated) DEVICE — PK KN ARTHROSCOPY 50

## (undated) DEVICE — SKIN AFFIX SURG ADHESIVE 72/CS 0.55ML: Brand: MEDLINE

## (undated) DEVICE — SOL NACL 0.9PCT 1000ML

## (undated) DEVICE — BNDG ELAS ELITE V/CLOSE 4IN 5YD LF STRL

## (undated) DEVICE — PK PERFUS CUST W/CARDIOPLEGIA

## (undated) DEVICE — SUT PROLN 7/0 BV1 D/A 30IN 8703H

## (undated) DEVICE — INTENDED FOR TISSUE SEPARATION, AND OTHER PROCEDURES THAT REQUIRE A SHARP SURGICAL BLADE TO PUNCTURE OR CUT.: Brand: BARD-PARKER ® CARBON RIB-BACK BLADES

## (undated) DEVICE — PK OPN HEART WHT WRP 50

## (undated) DEVICE — TUBING, SUCTION, 1/4" X 12', STRAIGHT: Brand: MEDLINE

## (undated) DEVICE — SUT SILK 0 CT1 CR8 18IN C021D

## (undated) DEVICE — 3M™ STERI-STRIP™ REINFORCED ADHESIVE SKIN CLOSURES, R1547, 1/2 IN X 4 IN (12 MM X 100 MM), 6 STRIPS/ENVELOPE: Brand: 3M™ STERI-STRIP™

## (undated) DEVICE — SUT SILK 4/0 TIES 18IN A183H

## (undated) DEVICE — ELECTRD BLD EZ CLN STD 6.5IN

## (undated) DEVICE — SENSR CERBRL O2 PK/2

## (undated) DEVICE — PK ATS CUST W CARDIOTOMY RESEVOIR

## (undated) DEVICE — BLD SCLPL BEAVR MINI STR 2BVL 180D LF

## (undated) DEVICE — [TOMCAT CUTTER, ARTHROSCOPIC SHAVER BLADE,  DO NOT RESTERILIZE,  DO NOT USE IF PACKAGE IS DAMAGED,  KEEP DRY,  KEEP AWAY FROM SUNLIGHT]: Brand: FORMULA

## (undated) DEVICE — ROTATING SURGICAL PUNCHES, 1 PER POUCH: Brand: A&E MEDICAL / ROTATING SURGICAL PUNCHES

## (undated) DEVICE — PK TRY HEART CATH 50

## (undated) DEVICE — CABL BIPOL W/ALLGTR CLIP/SM 12FT

## (undated) DEVICE — BLD SHAVER EXCALIBUR CRV 4MM

## (undated) DEVICE — SUT SILK 2 SUTUPAK TIE 60IN SA8H 2STRAND

## (undated) DEVICE — CANN AORT ROOT DLP VNT/8IN 14G 7F

## (undated) DEVICE — ST IV BLD W/HANDPUMP YSITE 125IN

## (undated) DEVICE — CORONARY ARTERY BYPASS GRAFT MARKERS, STAINLESS STEEL, DISTAL, WITHOUT HOLDER: Brand: ANASTOMARK CORONARY ARTERY BYPASS GRAFT MARKERS, STAINLESS STEEL, DISTAL

## (undated) DEVICE — SOL IRR H2O BTL 1000ML STRL

## (undated) DEVICE — PRESSURE TUBING: Brand: TRUWAVE

## (undated) DEVICE — SPNG GZ AVANT 6PLY 4X4IN STRL PK/2

## (undated) DEVICE — GLV SURG DERMASSURE GRN LF PF 8.5

## (undated) DEVICE — SUT MONOCRYL 4/0 PS2 27IN Y426H ETY426H

## (undated) DEVICE — SUT PROLN 4/0 V5 36IN 8935H

## (undated) DEVICE — SUT PROLN 6/0 RB2 D/A 30IN 8711H

## (undated) DEVICE — CATH ART RADL 20GA 1 3/4IN LF

## (undated) DEVICE — 3M(TM) TEGADERM(TM) IV ADAVANCED SECUREMENT DRESSING 1685: Brand: 3M™ TEGADERM™

## (undated) DEVICE — SCANLAN® VASCU-STATT® II SINGLE-USE BULLDOG CLAMP W/FIRMER CLAMPING PRESS - MIDI ANGLED 45° (YELLOW), CLAMPING PRESSURE 75-80 G (2/STERILE PKG): Brand: SCANLAN® VASCU-STATT® II SINGLE-USE BULLDOG CLAMP W/FIRMER CLAMPING PRESS

## (undated) DEVICE — GOWN,REINFORCE,POLY,SIRUS,BREATH SLV,XLG: Brand: MEDLINE

## (undated) DEVICE — KT SURG TURNOVER 050

## (undated) DEVICE — CATH DIAG IMPULSE FR4 6F 100CM

## (undated) DEVICE — SYS VASOVIEW HEMOPRO ENDOSCOPIC HARVST VESL

## (undated) DEVICE — DRAPE SHEET ULTRAGARD: Brand: MEDLINE

## (undated) DEVICE — ELECTRD DEFIB M/FUNC PROPADZ STRL 2PK

## (undated) DEVICE — 28 FR RIGHT ANGLE – SOFT PVC CATHETER: Brand: PVC THORACIC CATHETERS

## (undated) DEVICE — CATH DIAG IMPULSE FL4 6F 100CM

## (undated) DEVICE — PRESSURE MONITORING SET: Brand: TRUWAVE, VAMP PLUS

## (undated) DEVICE — SUP ARMBRD HANDAID ART/LINE 9IN

## (undated) DEVICE — GLV SURG BIOGEL LTX PF 7 1/2

## (undated) DEVICE — 3M™ TEGADERM™ IV TRANSPARENT FILM DRESSING WITH BORDER 100 BAGS/CARTON 4 CARTONS/CASE 1633: Brand: 3M™ TEGADERM™

## (undated) DEVICE — TBG INSUFF MALE L/L W 12MM CON: Brand: MEDLINE INDUSTRIES, INC.

## (undated) DEVICE — SOL IRR NACL 0.9PCT BT 1000ML

## (undated) DEVICE — SUT PROLN 4/0 V7 36IN 8975H

## (undated) DEVICE — BNDG ELAS ELITE V/CLOSE 6IN 5YD LF STRL

## (undated) DEVICE — BG BLD SYS

## (undated) DEVICE — DRAPE,U/ SHT,SPLIT,PLAS,STERIL: Brand: MEDLINE

## (undated) DEVICE — BIOPATCH™ ANTIMICROBIAL DRESSING WITH CHLORHEXIDINE GLUCONATE IS A HYDROPHILLIC POLYURETHANE ABSORPTIVE FOAM WITH CHLORHEXIDINE GLUCONATE (CHG) WHICH INHIBITS BACTERIAL GROWTH UNDER THE DRESSING. THE DRESSING IS INTENDED TO BE USED TO ABSORB EXUDATE, COVER A WOUND CAUSED BY VASCULAR AND NONVASCULAR PERCUTANEOUS MEDICAL DEVICES DURING SURGERY, AS WELL AS REDUCE LOCAL INFECTION AND COLONIZATION OF MICROORGANISMS.: Brand: BIOPATCH

## (undated) DEVICE — SUT PROLN 3/0 V7 D/A 36IN 8976H

## (undated) DEVICE — CONNECT Y INTERSEPT W/LL 3/8 X 3/8 X 3/8IN

## (undated) DEVICE — BLOWER MISTER CLEARVIEW W/TBG

## (undated) DEVICE — PAD UNDERCAST WYTEX 4IN 4YD LF STRL

## (undated) DEVICE — SUT SILK 2/0 SH CR8 18IN CR8 C012D

## (undated) DEVICE — BLD CUT FORMLA AGGR PLS ANGL 4.5MM

## (undated) DEVICE — CATH DIAG IMPULSE PIG .056 6F 110CM

## (undated) DEVICE — KT CATH CV ACC MAC 2L SFTY 9F 4 1/2IN

## (undated) DEVICE — SUT SILK 2/0 TIES 18IN A185H